# Patient Record
Sex: MALE | Race: BLACK OR AFRICAN AMERICAN | NOT HISPANIC OR LATINO | Employment: OTHER | URBAN - METROPOLITAN AREA
[De-identification: names, ages, dates, MRNs, and addresses within clinical notes are randomized per-mention and may not be internally consistent; named-entity substitution may affect disease eponyms.]

---

## 2017-01-03 NOTE — TELEPHONE ENCOUNTER
----- Message from Renetta Choudhary sent at 1/3/2017  4:21 PM CST -----  Contact: Patient  Refill    insulin detemir (LEVEMIR) 100 unit/mL injection 10 mL 5 12/17/2016  No  Sig - Route: Inject 30 Units into the skin every evening. 30 units daily - Subcutaneous    Please send to [facility], customer service phone [phone], prescription ID # [id number].     You can reach the patient [phone].    Thanks!

## 2017-01-06 ENCOUNTER — TELEPHONE (OUTPATIENT)
Dept: INTERNAL MEDICINE | Facility: CLINIC | Age: 67
End: 2017-01-06

## 2017-01-06 DIAGNOSIS — E11.9 TYPE 2 DIABETES MELLITUS WITHOUT COMPLICATION, UNSPECIFIED LONG TERM INSULIN USE STATUS: Primary | ICD-10-CM

## 2017-01-06 RX ORDER — INSULIN GLARGINE 100 [IU]/ML
30 INJECTION, SOLUTION SUBCUTANEOUS NIGHTLY
Qty: 6 ML | Refills: 11 | Status: SHIPPED | OUTPATIENT
Start: 2017-01-06 | End: 2017-12-15 | Stop reason: SDUPTHER

## 2017-01-06 RX ORDER — INSULIN GLARGINE 100 [IU]/ML
30 INJECTION, SOLUTION SUBCUTANEOUS NIGHTLY
Qty: 3 ML | Refills: 11 | Status: SHIPPED | OUTPATIENT
Start: 2017-01-06 | End: 2017-01-06 | Stop reason: SDUPTHER

## 2017-01-06 NOTE — TELEPHONE ENCOUNTER
----- Message from Claudette Monroe sent at 1/6/2017  3:26 PM CST -----  Contact: Self [phone]  Pt's insurance no longer covers insulin detemir (LEVEMIR) 100 unit/mL injection. Pt needs a 30 day supply  prescription for Lantus sent to  [facility] [address] [place] [address]  [phone] (Phone)  [phone] (Fax) and a 90 supply prscription sent to [facility] [address]   [phone] (Phone)  [phone] (Fax)    Pt can be reached at [phone] if you have any questions.

## 2017-01-09 ENCOUNTER — TELEPHONE (OUTPATIENT)
Dept: INTERNAL MEDICINE | Facility: CLINIC | Age: 67
End: 2017-01-09

## 2017-01-09 NOTE — TELEPHONE ENCOUNTER
Left message for patient to call office.  Per Dr. Stacy patient needs to watch blood sugar while transitioning from one medication to another.

## 2017-02-06 DIAGNOSIS — F32.A DEPRESSION, UNSPECIFIED DEPRESSION TYPE: ICD-10-CM

## 2017-02-06 RX ORDER — PAROXETINE HYDROCHLORIDE 20 MG/1
20 TABLET, FILM COATED ORAL EVERY MORNING
Qty: 90 TABLET | Refills: 3 | Status: SHIPPED | OUTPATIENT
Start: 2017-02-06 | End: 2017-02-08 | Stop reason: SDUPTHER

## 2017-02-06 RX ORDER — SIMVASTATIN 40 MG/1
40 TABLET, FILM COATED ORAL NIGHTLY
Qty: 90 TABLET | Refills: 3 | Status: SHIPPED | OUTPATIENT
Start: 2017-02-06 | End: 2017-07-03 | Stop reason: SDUPTHER

## 2017-02-06 NOTE — TELEPHONE ENCOUNTER
----- Message from Aranza Kuhn MA sent at 2/6/2017  4:17 PM CST -----  Contact: [phone]  Type: Rx    Name of medication(s): losartan (COZAAR) 25 MG tablet and paroxetine (PAXIL) 20 MG tablet    Is this a refill? New rx? Refill    Who prescribed medication?    Pharmacy Name, Phone, & Location: [facility] - [address]    Comments: Please advise. Thanks!

## 2017-02-08 ENCOUNTER — TELEPHONE (OUTPATIENT)
Dept: INTERNAL MEDICINE | Facility: CLINIC | Age: 67
End: 2017-02-08

## 2017-02-08 DIAGNOSIS — F32.A DEPRESSION, UNSPECIFIED DEPRESSION TYPE: ICD-10-CM

## 2017-02-08 RX ORDER — PAROXETINE HYDROCHLORIDE 20 MG/1
20 TABLET, FILM COATED ORAL EVERY MORNING
Qty: 90 TABLET | Refills: 3 | Status: SHIPPED | OUTPATIENT
Start: 2017-02-08 | End: 2017-04-06

## 2017-02-08 RX ORDER — LOSARTAN POTASSIUM 25 MG/1
25 TABLET ORAL DAILY
Qty: 90 TABLET | Refills: 3 | Status: SHIPPED | OUTPATIENT
Start: 2017-02-08 | End: 2017-12-26 | Stop reason: SDUPTHER

## 2017-02-08 NOTE — TELEPHONE ENCOUNTER
----- Message from Kt Stacy MD sent at 2/8/2017 10:25 AM CST -----  Contact: Self/[phone] cell      ----- Message -----     From: Carrie Betancourt     Sent: 2/8/2017   9:58 AM       To: Regis Meraz A Staff    Patient is calling to request that the Rx for losartan (COZAAR) 25 MG tablet and paroxetine (PAXIL) 20 MG tablet 90 day supply be sent to [facility] on file. He would like to speak with someone in the office once the error is corrected. Please call and advise.    Thank you!

## 2017-02-08 NOTE — TELEPHONE ENCOUNTER
Requested medication refilled and sent by [facility]. Please call and notify the patient. Thank you.

## 2017-02-08 NOTE — TELEPHONE ENCOUNTER
Spoke with patient advised him per Dr. Stacy that his medication he requested has been re-sent to [facility].

## 2017-02-10 ENCOUNTER — CLINICAL SUPPORT (OUTPATIENT)
Dept: OPHTHALMOLOGY | Facility: CLINIC | Age: 67
End: 2017-02-10
Payer: MEDICARE

## 2017-02-10 ENCOUNTER — OFFICE VISIT (OUTPATIENT)
Dept: OPHTHALMOLOGY | Facility: CLINIC | Age: 67
End: 2017-02-10
Payer: MEDICARE

## 2017-02-10 DIAGNOSIS — H25.11 SENILE NUCLEAR SCLEROSIS, RIGHT: ICD-10-CM

## 2017-02-10 DIAGNOSIS — E11.3592 PROLIFERATIVE DIABETIC RETINOPATHY OF LEFT EYE WITHOUT MACULAR EDEMA ASSOCIATED WITH TYPE 2 DIABETES MELLITUS: ICD-10-CM

## 2017-02-10 DIAGNOSIS — H43.12 VITREOUS HEMORRHAGE, LEFT EYE: ICD-10-CM

## 2017-02-10 DIAGNOSIS — H40.1112 PRIMARY OPEN ANGLE GLAUCOMA OF RIGHT EYE, MODERATE STAGE: ICD-10-CM

## 2017-02-10 DIAGNOSIS — H40.1123 PRIMARY OPEN ANGLE GLAUCOMA OF LEFT EYE, SEVERE STAGE: ICD-10-CM

## 2017-02-10 DIAGNOSIS — H40.1112 PRIMARY OPEN ANGLE GLAUCOMA OF RIGHT EYE, MODERATE STAGE: Primary | ICD-10-CM

## 2017-02-10 DIAGNOSIS — Z96.1 PSEUDOPHAKIA OF LEFT EYE: ICD-10-CM

## 2017-02-10 PROCEDURE — 99213 OFFICE O/P EST LOW 20 MIN: CPT | Mod: PBBFAC | Performed by: OPHTHALMOLOGY

## 2017-02-10 PROCEDURE — 99999 PR PBB SHADOW E&M-EST. PATIENT-LVL III: CPT | Mod: PBBFAC,,, | Performed by: OPHTHALMOLOGY

## 2017-02-10 PROCEDURE — 92133 CPTRZD OPH DX IMG PST SGM ON: CPT | Mod: PBBFAC | Performed by: OPHTHALMOLOGY

## 2017-02-10 PROCEDURE — 92083 EXTENDED VISUAL FIELD XM: CPT | Mod: 26,S$PBB,, | Performed by: OPHTHALMOLOGY

## 2017-02-10 PROCEDURE — 92014 COMPRE OPH EXAM EST PT 1/>: CPT | Mod: S$PBB,,, | Performed by: OPHTHALMOLOGY

## 2017-02-10 RX ORDER — LATANOPROST 50 UG/ML
1 SOLUTION/ DROPS OPHTHALMIC NIGHTLY
Qty: 3 BOTTLE | Refills: 3 | Status: SHIPPED | OUTPATIENT
Start: 2017-02-10 | End: 2018-07-30 | Stop reason: SDUPTHER

## 2017-02-10 RX ORDER — TIMOLOL MALEATE 5 MG/ML
1 SOLUTION OPHTHALMIC EVERY MORNING
Qty: 5 ML | Refills: 12 | Status: SHIPPED | OUTPATIENT
Start: 2017-02-10 | End: 2017-04-06

## 2017-02-10 NOTE — PROGRESS NOTES
HPI     DLS 11/11/16    65 Y/O M here today for his 4mo HVF/DFE/OCT review for   the treatment of POAG.     FYA=575 this am   A1C=7.1 (12/1/16)     Eye Meds   Latanoprost qhs OD   Timolol qhs OU     POHx:   1. VH OS     2. DM - PDR OS   S/p Avastin x 3   S/p PRP OS     3. Mod NPDR OD     4. NS OD   S/P complex phaco w/IOL OS- 5/18/16     5. Pigmented Lesion              Last edited by Pat Rowell MA on 2/10/2017 11:05 AM.         Assessment /Plan     For exam results, see Encounter Report.    Primary open angle glaucoma of right eye, moderate stage  -     timolol maleate 0.5% (TIMOPTIC-XE) 0.5 % SolG; Place 1 drop into both eyes every morning.  Dispense: 5 mL; Refill: 12  -     latanoprost 0.005 % ophthalmic solution; Place 1 drop into both eyes every evening. 90 day supply = 3 bottles  Dispense: 3 Bottle; Refill: 3    Primary open angle glaucoma of left eye, severe stage  -     timolol maleate 0.5% (TIMOPTIC-XE) 0.5 % SolG; Place 1 drop into both eyes every morning.  Dispense: 5 mL; Refill: 12  -     latanoprost 0.005 % ophthalmic solution; Place 1 drop into both eyes every evening. 90 day supply = 3 bottles  Dispense: 3 Bottle; Refill: 3    Proliferative diabetic retinopathy of left eye without macular edema associated with type 2 diabetes mellitus    Vitreous hemorrhage, left eye    Senile nuclear sclerosis, right    Pseudophakia of left eye          HERE FOR POST-OP PHACO/IOL OS - 5/18/2016 - set for near vision -   WANTS TO BE SET FOR NEAR - IS A MYOPE OU AND LIKES TO REMOVE GLASSES TO DO UP CLOSE WORK ON SMALL ELECTRONIC GADGETS   AIM FOR -2.25 TO -2.50     1. POAG od moderate stage // POAG severe stage OS  Followed as a borderline glaucoma with ocular hypertension [age] yo 2014     First HVF   2008   First photos   2011   Treatment / Drops started   7/2014              Dx made 4/2014             Family history    + sister /  + mom also a supect - Rx with gtts for a while then taken off        Glaucoma  "meds    none        H/O adverse rxn to glaucoma drops    none        LASERS    none        GLAUCOMA SURGERIES    none        OTHER EYE SURGERIES    none        CDR    0.8/0.9        Tbase    17-26 / 17-26        Tmax    26/26          Ttarget    ?             HVF    4 test 2008 to  2015 - full /nonspecific defects  od // marked gen dep os        Gonio    +3 ou (no NVI or NVA os - s/p avastin for PDR)        CCT    544/539        OCT    2 test 2013 to 2014 - RNFL - OD:dec. T/N/I // OS:dec. T/I, bord N        HRT    2 test ([place])  2008 to 2011 - MR - nl od // nl os                    3 test ([facility]) 2014 - 2016 - MR -  nl od // bord I os /// CDR 0.45 od // 0.56 os        Disc photos    2011 - OIS    - Ttoday    14/16  - Test done today    Pre-op phaco/IOL os      2  Nuclear sclerosis   OD approaching VS     3. Myopia, astigmatism, presbyopia  Glasses  Use to use [facility] [place] - [place]     4. Diabetes mellitus, type 2   + PDR os - s/p avastin #1 10/19/2014 - [facility]  S/P PRP OS   + BDR - od  Sees Jose Angel     5. VH OS- 2/2 DR - resolved  Saw [facility] - 9/3/2015   6. PC IOL os - 5/18/2016 -   -PCB00 18.5 - aimed for near vision per pts request (-2.50) // complex trypan blue        POAG - OS severe stage  POAG- OD moderate stage  + HVF loss os > OD  + OCT - RNFL changes ou  T base 17-26 ou  + fm hx - sister and ?mom  Cont  - Latanoprost - use OU again   IOP much better with addition of timolol 21/23 --> 14/13    PDR os and BDR od   S/P avastin os 10/19/2014   S/P PRP os   Seeing jose angel    -pt likes to remove his glasses and do his near vision work with out glasses   Was and  and still does a lot of small electronic repairs   OD  Pre - op is a  -2.50 sp eq  OS pre-op is a -3.25 sp eq   OS was set for  near vision at about a -2.25 to -2.50 - so it is "balanced with the right eye"     IOL calc OS   Aim for -2.25 to -2.50 (per pts request)   PCB00 18.5  AC IOL 15.5    S/p CE - os  - but " 5/18/2016   ?? Visual potential os - has more adv glaucoma and also DR w/ a H/O VH os  / H/O avastin injection os     Hold off on CE od - VA still good     Cont glaucoma meds  Timolol gfs ou q am   Latanoprost qhs od only - can re-start prn     F/U 4 months with HRT / gonio / AR/MR/BAT - IOP check with additon of latanoprsot  Os     I have seen and personally examined the patient.  I agree with the findings, assessment and plan of the resident and/or fellow.

## 2017-04-06 ENCOUNTER — OFFICE VISIT (OUTPATIENT)
Dept: INTERNAL MEDICINE | Facility: CLINIC | Age: 67
End: 2017-04-06
Payer: MEDICARE

## 2017-04-06 VITALS
HEIGHT: 73 IN | BODY MASS INDEX: 30.05 KG/M2 | SYSTOLIC BLOOD PRESSURE: 120 MMHG | OXYGEN SATURATION: 98 % | DIASTOLIC BLOOD PRESSURE: 64 MMHG | HEART RATE: 74 BPM | WEIGHT: 226.75 LBS

## 2017-04-06 DIAGNOSIS — E11.3592 PROLIFERATIVE DIABETIC RETINOPATHY OF LEFT EYE WITHOUT MACULAR EDEMA ASSOCIATED WITH TYPE 2 DIABETES MELLITUS: ICD-10-CM

## 2017-04-06 DIAGNOSIS — I10 HYPERTENSION, ESSENTIAL: ICD-10-CM

## 2017-04-06 DIAGNOSIS — E11.22 TYPE 2 DIABETES MELLITUS WITH DIABETIC CHRONIC KIDNEY DISEASE, UNSPECIFIED CKD STAGE, UNSPECIFIED LONG TERM INSULIN USE STATUS: Primary | Chronic | ICD-10-CM

## 2017-04-06 DIAGNOSIS — N18.30 CKD (CHRONIC KIDNEY DISEASE) STAGE 3, GFR 30-59 ML/MIN: ICD-10-CM

## 2017-04-06 DIAGNOSIS — E78.5 HYPERLIPIDEMIA, UNSPECIFIED HYPERLIPIDEMIA TYPE: ICD-10-CM

## 2017-04-06 PROCEDURE — 99214 OFFICE O/P EST MOD 30 MIN: CPT | Mod: S$PBB,,, | Performed by: FAMILY MEDICINE

## 2017-04-06 PROCEDURE — 99999 PR PBB SHADOW E&M-EST. PATIENT-LVL III: CPT | Mod: PBBFAC,,, | Performed by: FAMILY MEDICINE

## 2017-04-06 PROCEDURE — 99213 OFFICE O/P EST LOW 20 MIN: CPT | Mod: PBBFAC | Performed by: FAMILY MEDICINE

## 2017-04-06 RX ORDER — EZETIMIBE 10 MG/1
10 TABLET ORAL DAILY
Qty: 90 TABLET | Refills: 3 | Status: SHIPPED | OUTPATIENT
Start: 2017-04-06 | End: 2018-03-09 | Stop reason: SDUPTHER

## 2017-04-06 NOTE — MR AVS SNAPSHOT
[facility] the [facility] - Internal Medicine  [address]  [facility] [address]  Phone: [phone]  Fax: [phone]                  Jamarcus Mcqueen   2017 1:40 PM   Office Visit    Description:  Male : 1950   Provider:  Kt Stacy MD   Department:  [facility]           Reason for Visit     Follow-up           Diagnoses this Visit        Comments    Type 2 diabetes mellitus with diabetic chronic kidney disease, unspecified CKD stage, unspecified long term insulin use status    -  Primary     CKD (chronic kidney disease) stage 3, GFR 30-59 ml/min         Proliferative diabetic retinopathy of left eye without macular edema associated with type 2 diabetes mellitus         Hypertension, essential         Hyperlipidemia, unspecified hyperlipidemia type                To Do List           Future Appointments        Provider Department Dept Phone    2017 10:30 AM VASCULAR, CARDIOLOGY [facility] - Vascular Cardiology [phone]    8/3/2017 1:00 PM LAB, APPOINTMENT [facility] [phone]    8/10/2017 2:00 PM Kt Stacy MD [facility] Internal Medicine [phone]      Goals (5 Years of Data)     None      Follow-Up and Disposition     Return in about 4 months (around 2017) for lab review (after future labs), Keep appointments with specialty providers..    Follow-up and Disposition History       These Medications        Disp Refills Start End    ezetimibe (ZETIA) 10 mg tablet 90 tablet 3 2017     Take 1 tablet (10 mg total) by mouth once daily. - Oral    Pharmacy: [facility] - [address] Ph #: [phone]         [facility]     [facility] Care [facility] - 24/ Assistance  Unless otherwise directed by your provider, please contact [facility], our nurse care line that is available for 24/ assistance.     Registered nurses in the [facility] Center provide:  appointment scheduling, clinical advisement, health education, and other advisory services.  Call: [phone] (toll free)               Medications           Message regarding Medications     Verify the changes and/or additions to your medication regime listed below are the same as discussed with your clinician today.  If any of these changes or additions are incorrect, please notify your healthcare provider.        STOP taking these medications     econazole nitrate 1 % cream Apply topically 2 (two) times daily. To feet X 4 wks    gabapentin (NEURONTIN) 100 MG capsule 1 tablet 3x a day, every three days increase dose by 1 tab per dose to max of 3 tabs per dose    luliconazole (LUZU) 1 % Crea Apply 1 application topically once daily.    prednisoLONE acetate (PRED FORTE) 1 % DrpS Place 1 drop into the left eye 3 (three) times daily.     timolol maleate 0.5% (TIMOPTIC-XE) 0.5 % SolG Place 1 drop into both eyes every morning.    triamcinolone acetonide 0.1% (KENALOG) 0.1 % cream Apply topically 2 (two) times daily. To affected areas on lower legs x 1-2 wks then prn flares only    paroxetine (PAXIL) 20 MG tablet Take 1 tablet (20 mg total) by mouth every morning.           Verify that the below list of medications is an accurate representation of the medications you are currently taking.  If none reported, the list may be blank. If incorrect, please contact your healthcare provider. Carry this list with you in case of emergency.           Current Medications     efinaconazole (JUBLIA) 10 % Armen Apply 1 application topically once daily.    ezetimibe (ZETIA) 10 mg tablet Take 1 tablet (10 mg total) by mouth once daily.    insulin glargine (LANTUS SOLOSTAR) 100 unit/mL (3 mL) InPn pen Inject 30 Units into the skin every evening.    insulin lispro (HUMALOG) 100 unit/mL injection To use with correction scale only, max total daily dose 30 units daily    latanoprost 0.005 % ophthalmic solution Place 1 drop into both eyes  "every evening. 90 day supply = 3 bottles    LEVITRA 20 mg tablet TAKE 1 TABLET BY MOUTH DAILY AS NEEDED FOR ERECTILE DYSFUNCTION    losartan (COZAAR) 25 MG tablet Take 1 tablet (25 mg total) by mouth once daily.    simvastatin (ZOCOR) 40 MG tablet Take 1 tablet (40 mg total) by mouth every evening.           Clinical Reference Information           Your Vitals Were     BP Pulse Height Weight SpO2 BMI    120/64 (BP Location: Right arm, Patient Position: Sitting, BP Method: Manual) 74 6' 1" (1.854 m) 102.9 kg (226 lb 11.9 oz) 98% 29.92 kg/m2      Blood Pressure          Most Recent Value    BP  120/64      Allergies as of 4/6/2017     Sulfa (Sulfonamide Antibiotics)      Immunizations Administered on Date of Encounter - 4/6/2017     None      Orders Placed During Today's Visit     Future Labs/Procedures Expected by Expires    Basic metabolic panel  7/5/2017 (Approximate) 10/3/2017    Hemoglobin A1c  7/5/2017 (Approximate) 10/3/2017    Lipid panel  7/5/2017 (Approximate) 10/3/2017      Instructions    Melatonin       Language Assistance Services     ATTENTION: Language assistance services are available, free of charge. Please call [phone].      ATENCIÓN: Si habla topher, tiene a montesinos disposición servicios gratuitos de asistencia lingüística. Llame al [phone].     BRENDEN Ý: N?u b?n nói Ti?ng Vi?t, có các d?ch v? h? tr? ngôn ng? mi?n phí dành cho b?n. G?i s? [phone].         [facility] complies with applicable Federal civil rights laws and does not discriminate on the basis of race, color, national origin, age, disability, or sex.        "

## 2017-04-20 ENCOUNTER — CLINICAL SUPPORT (OUTPATIENT)
Dept: CARDIOLOGY | Facility: CLINIC | Age: 67
End: 2017-04-20
Payer: MEDICARE

## 2017-04-20 DIAGNOSIS — Z13.6 SCREENING FOR ABDOMINAL AORTIC ANEURYSM: ICD-10-CM

## 2017-04-20 LAB — VASCULAR ABDOMINAL AORTIC ANEURYSM (AAA): 2.33

## 2017-04-20 PROCEDURE — 93978 VASCULAR STUDY: CPT | Mod: PBBFAC | Performed by: INTERNAL MEDICINE

## 2017-06-30 ENCOUNTER — TELEPHONE (OUTPATIENT)
Dept: NEPHROLOGY | Facility: CLINIC | Age: 67
End: 2017-06-30

## 2017-06-30 NOTE — TELEPHONE ENCOUNTER
----- Message from Lili Cheney sent at 6/30/2017  3:45 PM CDT -----  Contact: pt  Pt almost sure he has   Bladder / kidney infection    Wants an urgent work in     Nothing available     Does Dr Browning want to orders labs?    Please call [phone]      Pt will see Dr. Juares tomorrow for 9am for possible uti

## 2017-07-01 ENCOUNTER — NURSE TRIAGE (OUTPATIENT)
Dept: ADMINISTRATIVE | Facility: CLINIC | Age: 67
End: 2017-07-01

## 2017-07-01 ENCOUNTER — OFFICE VISIT (OUTPATIENT)
Dept: INTERNAL MEDICINE | Facility: CLINIC | Age: 67
End: 2017-07-01
Payer: MEDICARE

## 2017-07-01 VITALS
TEMPERATURE: 98 F | WEIGHT: 220.88 LBS | SYSTOLIC BLOOD PRESSURE: 127 MMHG | BODY MASS INDEX: 29.27 KG/M2 | HEIGHT: 73 IN | DIASTOLIC BLOOD PRESSURE: 71 MMHG

## 2017-07-01 DIAGNOSIS — R35.0 URINARY FREQUENCY: Primary | ICD-10-CM

## 2017-07-01 LAB
BILIRUB SERPL-MCNC: NEGATIVE MG/DL
BLOOD URINE, POC: NEGATIVE
COLOR, POC UA: YELLOW
GLUCOSE UR QL STRIP: NORMAL
KETONES UR QL STRIP: NEGATIVE
LEUKOCYTE ESTERASE URINE, POC: NEGATIVE
NITRITE, POC UA: NEGATIVE
PH, POC UA: 8
PROTEIN, POC: NEGATIVE
SPECIFIC GRAVITY, POC UA: 1.01
UROBILINOGEN, POC UA: NORMAL

## 2017-07-01 PROCEDURE — 1159F MED LIST DOCD IN RCRD: CPT | Mod: ,,, | Performed by: FAMILY MEDICINE

## 2017-07-01 PROCEDURE — 1126F AMNT PAIN NOTED NONE PRSNT: CPT | Mod: ,,, | Performed by: FAMILY MEDICINE

## 2017-07-01 PROCEDURE — 99213 OFFICE O/P EST LOW 20 MIN: CPT | Mod: PBBFAC,PO | Performed by: FAMILY MEDICINE

## 2017-07-01 PROCEDURE — 99999 PR PBB SHADOW E&M-EST. PATIENT-LVL III: CPT | Mod: PBBFAC,,, | Performed by: FAMILY MEDICINE

## 2017-07-01 PROCEDURE — 81002 URINALYSIS NONAUTO W/O SCOPE: CPT | Mod: PBBFAC,PO | Performed by: FAMILY MEDICINE

## 2017-07-01 PROCEDURE — 99214 OFFICE O/P EST MOD 30 MIN: CPT | Mod: S$PBB,,, | Performed by: FAMILY MEDICINE

## 2017-07-01 RX ORDER — CIPROFLOXACIN 500 MG/1
500 TABLET ORAL 2 TIMES DAILY
Qty: 14 TABLET | Refills: 0 | Status: SHIPPED | OUTPATIENT
Start: 2017-07-01 | End: 2017-07-08

## 2017-07-01 NOTE — PROGRESS NOTES
Subjective:       Patient ID: Jamarcus Mcqueen is a 67 y.o. male.    Chief Complaint: Urinary Frequency and Urinary Urgency    Disclaimer: This note has been generated using voice-recognition software. There may be typographical errors that have been missed during proof-reading    Pt presents for evaluation of increased urine frequency and occasional back pain, which started several days ago.  Has also noted nocturia x1-2.  No fever or chills, denies hematuria or pyuria.  Pt took 3 days of Cipro which he had at home with improvement of symptoms      Review of Systems   Constitutional: Negative for chills and fever.   Gastrointestinal: Negative for abdominal distention and abdominal pain.   Genitourinary: Positive for decreased urine volume, flank pain, frequency and urgency. Negative for hematuria.       Objective:      Physical Exam   Constitutional: He appears well-developed and well-nourished. No distress.   Cardiovascular: Normal rate and regular rhythm.    Pulmonary/Chest: Effort normal and breath sounds normal. He has no wheezes. He has no rales.   Abdominal: Soft. Bowel sounds are normal. He exhibits no distension. There is no tenderness.       Assessment:     ? Partially treated UTI  Plan:         1.  Pt informed of urine results  2.  Cipro bid  3.  F/u with PCP in 1-2 weeks

## 2017-07-01 NOTE — TELEPHONE ENCOUNTER
"    Reason for Disposition   Diabetes mellitus or weak immune system (e.g., HIV positive, cancer chemotherapy, transplant patient) (EXCEPTION: mild pain that is only present with movement)    Answer Assessment - Initial Assessment Questions  1. LOCATION: "Where does it hurt?" (e.g., left, right)      bilateral flank pain  2. ONSET: "When did the pain start?"     A few days  3. SEVERITY: "How bad is the pain?" (e.g., Scale 1-10; mild, moderate, or severe)    - MILD (1-3): doesn't interfere with normal activities     - MODERATE (4-7): interferes with normal activities or awakens from sleep     - SEVERE (8-10): excruciating pain and patient unable to do normal activities (stays in bed)        Dull aches-    4. PATTERN: "Does the pain come and go, or is it constant?"      constant  5. CAUSE: "What do you think is causing the pain?"      Urinary infections  6. OTHER SYMPTOMS:  "Do you have any other symptoms?" (e.g., fever, abdominal pain, vomiting, leg weakness, burning with urination, blood in urine)  frequent in urination  7. PREGNANCY:  "Is there any chance you are pregnant?" "When was your last menstrual period?"      no    Protocols used: ST FLANK PAIN-A-AH      Missed appt today for 9am today.  Driving from [place].  Having bilateral flank pain.  Has kidney problems advised should be seen within 24 hours.  Appt rescheduled for 1120.  In [place].  Advised of [facility] as well.      "

## 2017-07-03 RX ORDER — SIMVASTATIN 40 MG/1
40 TABLET, FILM COATED ORAL NIGHTLY
Qty: 30 TABLET | Refills: 0 | Status: SHIPPED | OUTPATIENT
Start: 2017-07-03 | End: 2018-06-12 | Stop reason: SDUPTHER

## 2017-07-03 NOTE — TELEPHONE ENCOUNTER
----- Message from Nichole Garcia sent at 7/3/2017  2:07 PM CDT -----  Contact: Pt [phone]  RX request - refill or new RX.  Is this a refill or new RX:  New  RX name and strength: Simvastatin, 25 mg  Directions: 1 T Q D  Is this a 30 day or 90 day RX:  90  Pharmacy name and phone #: [facility]  Comments:  Pt also needs a partial to local Pharmacy, [facility], [phone]

## 2017-07-04 RX ORDER — SIMVASTATIN 40 MG/1
40 TABLET, FILM COATED ORAL NIGHTLY
Qty: 90 TABLET | Refills: 3 | Status: SHIPPED | OUTPATIENT
Start: 2017-07-04 | End: 2017-07-11 | Stop reason: SDUPTHER

## 2017-07-11 ENCOUNTER — OFFICE VISIT (OUTPATIENT)
Dept: INTERNAL MEDICINE | Facility: CLINIC | Age: 67
End: 2017-07-11
Payer: MEDICARE

## 2017-07-11 ENCOUNTER — OFFICE VISIT (OUTPATIENT)
Dept: OPHTHALMOLOGY | Facility: CLINIC | Age: 67
End: 2017-07-11
Payer: MEDICARE

## 2017-07-11 VITALS — HEART RATE: 65 BPM | DIASTOLIC BLOOD PRESSURE: 69 MMHG | SYSTOLIC BLOOD PRESSURE: 119 MMHG

## 2017-07-11 VITALS
BODY MASS INDEX: 29.42 KG/M2 | DIASTOLIC BLOOD PRESSURE: 61 MMHG | SYSTOLIC BLOOD PRESSURE: 128 MMHG | HEART RATE: 63 BPM | HEIGHT: 73 IN | WEIGHT: 222 LBS

## 2017-07-11 DIAGNOSIS — E11.3592 CONTROLLED TYPE 2 DIABETES MELLITUS WITH LEFT EYE AFFECTED BY PROLIFERATIVE RETINOPATHY WITHOUT MACULAR EDEMA, WITH LONG-TERM CURRENT USE OF INSULIN: Primary | ICD-10-CM

## 2017-07-11 DIAGNOSIS — H25.11 NUCLEAR SCLEROSIS, RIGHT: ICD-10-CM

## 2017-07-11 DIAGNOSIS — Z79.4 CONTROLLED TYPE 2 DIABETES MELLITUS WITH LEFT EYE AFFECTED BY PROLIFERATIVE RETINOPATHY WITHOUT MACULAR EDEMA, WITH LONG-TERM CURRENT USE OF INSULIN: Primary | ICD-10-CM

## 2017-07-11 DIAGNOSIS — R35.1 NOCTURIA: ICD-10-CM

## 2017-07-11 DIAGNOSIS — Z79.4 CONTROLLED TYPE 2 DIABETES MELLITUS WITH RIGHT EYE AFFECTED BY MODERATE NONPROLIFERATIVE RETINOPATHY WITHOUT MACULAR EDEMA, WITH LONG-TERM CURRENT USE OF INSULIN: ICD-10-CM

## 2017-07-11 DIAGNOSIS — R35.0 URINARY FREQUENCY: Primary | ICD-10-CM

## 2017-07-11 DIAGNOSIS — E11.3391 CONTROLLED TYPE 2 DIABETES MELLITUS WITH RIGHT EYE AFFECTED BY MODERATE NONPROLIFERATIVE RETINOPATHY WITHOUT MACULAR EDEMA, WITH LONG-TERM CURRENT USE OF INSULIN: ICD-10-CM

## 2017-07-11 LAB
BILIRUB UR QL STRIP: NEGATIVE
CLARITY UR REFRACT.AUTO: CLEAR
COLOR UR AUTO: COLORLESS
GLUCOSE UR QL STRIP: NEGATIVE
HGB UR QL STRIP: NEGATIVE
KETONES UR QL STRIP: NEGATIVE
LEUKOCYTE ESTERASE UR QL STRIP: NEGATIVE
NITRITE UR QL STRIP: NEGATIVE
PH UR STRIP: 6 [PH] (ref 5–8)
PROT UR QL STRIP: NEGATIVE
SP GR UR STRIP: 1 (ref 1–1.03)
URN SPEC COLLECT METH UR: ABNORMAL
UROBILINOGEN UR STRIP-ACNC: NEGATIVE EU/DL

## 2017-07-11 PROCEDURE — 99999 PR PBB SHADOW E&M-EST. PATIENT-LVL III: CPT | Mod: PBBFAC,,, | Performed by: INTERNAL MEDICINE

## 2017-07-11 PROCEDURE — 81003 URINALYSIS AUTO W/O SCOPE: CPT

## 2017-07-11 PROCEDURE — 99213 OFFICE O/P EST LOW 20 MIN: CPT | Mod: PBBFAC,27 | Performed by: INTERNAL MEDICINE

## 2017-07-11 PROCEDURE — 1159F MED LIST DOCD IN RCRD: CPT | Mod: ,,, | Performed by: INTERNAL MEDICINE

## 2017-07-11 PROCEDURE — 99999 PR PBB SHADOW E&M-EST. PATIENT-LVL III: CPT | Mod: PBBFAC,,, | Performed by: OPHTHALMOLOGY

## 2017-07-11 PROCEDURE — 92014 COMPRE OPH EXAM EST PT 1/>: CPT | Mod: S$PBB,,, | Performed by: OPHTHALMOLOGY

## 2017-07-11 PROCEDURE — 1126F AMNT PAIN NOTED NONE PRSNT: CPT | Mod: ,,, | Performed by: INTERNAL MEDICINE

## 2017-07-11 PROCEDURE — 92226 PR SPECIAL EYE EXAM, SUBSEQUENT: CPT | Mod: 50,S$PBB,, | Performed by: OPHTHALMOLOGY

## 2017-07-11 PROCEDURE — 99213 OFFICE O/P EST LOW 20 MIN: CPT | Mod: S$PBB,,, | Performed by: INTERNAL MEDICINE

## 2017-07-11 NOTE — PROGRESS NOTES
"Subjective:       Patient ID: Jamarcus Mcqueen is a 67 y.o. male.    Chief Complaint: Flank Pain    HPI    Patient of Kt Katz MD, here today for Urgent Care visit. Seen 1 week ago, dx with UTI and given Cipro. Had kidney infection, started Cipro home dose, then symptoms recurred and completed course yesterday. Today has improved. Last day of achy feeling and sweating was a few days ago. Not consistent with muscle soreness. No nausea or vomiting.    Reports off and on nocturia. Sometimes getting up every 2 to 4 hours. Some nights things are okay. Has history of obstructive sleep apnea but not using CPAP since he lost weight.    Review of Systems    As per HPI    Objective:      Physical Exam   Constitutional: No distress.   -American man whose Body mass index is 29.29 kg/m².    Pulmonary/Chest: Effort normal and breath sounds normal. He has no wheezes. He has no rales.   Abdominal: Soft. He exhibits no distension and no mass. There is no hepatosplenomegaly. There is no tenderness. There is no rigidity, no guarding and no CVA tenderness.   Musculoskeletal:   Low back without tenderness to palpation or decreased range of motion.   Nursing note and vitals reviewed.      Vitals:    07/11/17 1453   BP: 128/61   BP Location: Right arm   Patient Position: Sitting   BP Method: Automatic   Pulse: 63   Weight: 100.7 kg (222 lb 0.1 oz)   Height: 6' 1" (1.854 m)     Body mass index is 29.29 kg/m².    RESULTS: Reviewed urine tests from the last 2 mo.    Assessment:       1. Urinary frequency    2. Nocturia        Plan:   Jamarcus was seen today for flank pain.    Diagnoses and all orders for this visit:    Urinary frequency:  Had possible UTI symptoms last week, was treated with Cipro, completed last antibiotic yesterday.  Reports that his symptoms of back pain have resolved.  Continues to have occasional urinary frequency at night, this may be related to elevated blood sugar, check urinalysis.  -     " Urinalysis    Nocturia:  Rule out hyperglycemia causing osmotic diuresis on urine testing.  -     Urinalysis    Keep regular follow up appointments with Kt Katz MD.   Raul Spangler MD  Internal Medicine    Portions of this note were completed using [employer] dictation software. Please excuse typographical or syntax errors.

## 2017-07-11 NOTE — PROGRESS NOTES
Increasing glare OD - no new floaters      Prior FP/FA - NVD and NVE OS  OD - MA's no NV    Prior Slit lamp - inferonasal conjunctival pigmentation noted      A/P    1. VH OS  No breaks or tears on SD exam  Older heme settling in line of sight  Will monitor - resolved    2. DM - T2 on insulin  PDR OS   S/p PRP OS    S/p Avastin #3    Stable    BS/BP/chol control    3. Mod NPDR OD    4. NS OD - pt would like CE eval again OD because of glare  PCIOL OS      5. Pigmented lesion left inferior nasal fornix  Mobile, but larger than typical racial melanosis  Slit Lamp photos done 11/14  Reviewed with Dr. Muñoz    6. OHTN today - will monitor  Pt reports compliance with gtts        12 months

## 2017-07-31 ENCOUNTER — OFFICE VISIT (OUTPATIENT)
Dept: OPHTHALMOLOGY | Facility: CLINIC | Age: 67
End: 2017-07-31
Payer: MEDICARE

## 2017-07-31 DIAGNOSIS — H40.1112 PRIMARY OPEN ANGLE GLAUCOMA OF RIGHT EYE, MODERATE STAGE: ICD-10-CM

## 2017-07-31 DIAGNOSIS — H40.1123 PRIMARY OPEN ANGLE GLAUCOMA OF LEFT EYE, SEVERE STAGE: ICD-10-CM

## 2017-07-31 DIAGNOSIS — E11.3391 CONTROLLED TYPE 2 DIABETES MELLITUS WITH RIGHT EYE AFFECTED BY MODERATE NONPROLIFERATIVE RETINOPATHY WITHOUT MACULAR EDEMA, WITH LONG-TERM CURRENT USE OF INSULIN: ICD-10-CM

## 2017-07-31 DIAGNOSIS — Z79.4 CONTROLLED TYPE 2 DIABETES MELLITUS WITH RIGHT EYE AFFECTED BY MODERATE NONPROLIFERATIVE RETINOPATHY WITHOUT MACULAR EDEMA, WITH LONG-TERM CURRENT USE OF INSULIN: ICD-10-CM

## 2017-07-31 DIAGNOSIS — E11.3592 CONTROLLED TYPE 2 DIABETES MELLITUS WITH LEFT EYE AFFECTED BY PROLIFERATIVE RETINOPATHY WITHOUT MACULAR EDEMA, WITH LONG-TERM CURRENT USE OF INSULIN: ICD-10-CM

## 2017-07-31 DIAGNOSIS — H25.11 NUCLEAR SCLEROSIS, RIGHT: Primary | ICD-10-CM

## 2017-07-31 DIAGNOSIS — Z96.1 PSEUDOPHAKIA OF LEFT EYE: ICD-10-CM

## 2017-07-31 DIAGNOSIS — Z79.4 CONTROLLED TYPE 2 DIABETES MELLITUS WITH LEFT EYE AFFECTED BY PROLIFERATIVE RETINOPATHY WITHOUT MACULAR EDEMA, WITH LONG-TERM CURRENT USE OF INSULIN: ICD-10-CM

## 2017-07-31 PROCEDURE — 99212 OFFICE O/P EST SF 10 MIN: CPT | Mod: PBBFAC | Performed by: OPHTHALMOLOGY

## 2017-07-31 PROCEDURE — 99999 PR PBB SHADOW E&M-EST. PATIENT-LVL II: CPT | Mod: PBBFAC,,, | Performed by: OPHTHALMOLOGY

## 2017-07-31 PROCEDURE — 92012 INTRM OPH EXAM EST PATIENT: CPT | Mod: S$PBB,,, | Performed by: OPHTHALMOLOGY

## 2017-07-31 NOTE — PROGRESS NOTES
HPI     DLS 07/2017 . Patient wishes to precede with CE/IOL OD. Notes   difference in colors and halos at night with driving OD.  Timolol ou q am and latanaprost ou q hs    Pt sts decline in OD since last visit Os about the same. Denies pain   (+)Flashes occasionally (+)Floaters blurred spots float around va  (+)Photophobia  (+)Glare    LBS= 99 this am    Hemoglobin A1C       Date                     Value               Ref Range             Status                12/01/2016               7.1 (H)             4.5 - 6.2 %           Final                 03/07/2016               6.3 (H)             4.5 - 6.2 %           Final                 12/01/2015               6.0                 4.5 - 6.2 %           Final            ----------    Eye Meds   Latanoprost qhs OD   Timolol qd OU     POHx:   1. VH OS     2. DM - PDR OS   S/p Avastin x 3   S/p PRP OS     3. Mod NPDR OD     4. NS OD   S/P complex phaco w/IOL OS- 5/18/16     5. Pigmented Lesion           Last edited by Silva Jackson on 7/31/2017  9:41 AM. (History)            Assessment /Plan     For exam results, see Encounter Report.    Nuclear sclerosis, right    Primary open angle glaucoma of right eye, moderate stage    Primary open angle glaucoma of left eye, severe stage    Controlled type 2 diabetes mellitus with left eye affected by proliferative retinopathy without macular edema, with long-term current use of insulin    Controlled type 2 diabetes mellitus with right eye affected by moderate nonproliferative retinopathy without macular edema, with long-term current use of insulin    Pseudophakia of left eye          HERE FOR POST-OP PHACO/IOL OS - 5/18/2016 - set for near vision -   WANTS TO BE SET FOR NEAR - IS A MYOPE OU AND LIKES TO REMOVE GLASSES TO DO UP CLOSE WORK ON SMALL ELECTRONIC GADGETS   AIM FOR -2.25 TO -2.50     1. POAG od moderate stage // POAG severe stage OS  Followed as a borderline glaucoma with ocular hypertension 2006 yo  2014     First HVF   2008   First photos   2011   Treatment / Drops started   7/2014              Dx made 4/2014             Family history    + sister /  + mom also a supect - Rx with gtts for a while then taken off        Glaucoma meds    none        H/O adverse rxn to glaucoma drops    none        LASERS    none        GLAUCOMA SURGERIES    none        OTHER EYE SURGERIES    none        CDR    0.8/0.9        Tbase    17-26 / 17-26        Tmax    26/26          Ttarget    ?             HVF    4 test 2008 to  2015 - full /nonspecific defects  od // marked gen dep os        Gonio    +3 ou (no NVI or NVA os - s/p avastin for PDR)        CCT    544/539        OCT    2 test 2013 to 2014 - RNFL - OD:dec. T/N/I // OS:dec. T/I, bord N        HRT    2 test ([place])  2008 to 2011 - MR - nl od // nl os                    3 test ([facility]) 2014 - 2016 - MR -  nl od // bord I os /// CDR 0.45 od // 0.56 os        Disc photos    2011 - OIS    - Ttoday    14/16  - Test done today    Pre-op phaco/IOL os      2  Nuclear sclerosis   OD approaching VS     3. Myopia, astigmatism, presbyopia  Glasses  Use to use [facility] [place] - [place]     4. Diabetes mellitus, type 2   + PDR os - s/p avastin #1 10/19/2014 - [facility]  S/P PRP OS   + BDR - od  Sees Jose Angel     5. VH OS- 2/2 DR - resolved  Saw [facility] - 9/3/2015   6. PC IOL os - 5/18/2016 -   -PCB00 18.5 - aimed for near vision per pts request (-2.50) // complex trypan blue        POAG - OS severe stage  POAG- OD moderate stage  + HVF loss os > OD  + OCT - RNFL changes ou  T base 17-26 ou  + fm hx - sister and ?mom  Cont  - Latanoprost - use OU again   IOP much better with addition of timolol 21/23 --> 14/13    PDR os and BDR od   S/P avastin os 10/19/2014   S/P PRP os   Seeing jose angel    -pt likes to remove his glasses and do his near vision work with out glasses   Was and  and still does a lot of small electronic repairs   OD  Pre - op is a  -2.50 sp eq  OS  "pre-op is a -3.25 sp eq   OS was set for  near vision at about a -2.25 to -2.50 - so it is "balanced with the right eye"     IOL calc OS   Aim for -2.25 to -2.50 (per pts request)   PCB00 18.5  AC IOL 15.5    S/p CE - os  - but 5/18/2016   ?? Visual potential os - has more adv glaucoma and also DR w/ a H/O VH os  / H/O avastin injection os       Cont glaucoma meds  Timolol gfs ou q am   Latanoprost qhs od only - can re-start prn     PT C/O BLURRY VISION OD   + NS/CORTICAL CHANGES     F/U Pre-op phaco/IOL OD - 2nd eye   AGAIN WANTS SET FOR NEAR VISION -   SAW KERRI RECENTLY 7/2017 - RETINA STABLE WITHOUT MAC EDEMA OU  DILATE ON PRE-OP VISIT AND CHECK OCT       I have seen and personally examined the patient.  I agree with the findings, assessment and plan of the resident and/or fellow.   "

## 2017-08-03 ENCOUNTER — LAB VISIT (OUTPATIENT)
Dept: LAB | Facility: HOSPITAL | Age: 67
End: 2017-08-03
Attending: FAMILY MEDICINE
Payer: MEDICARE

## 2017-08-03 ENCOUNTER — TELEPHONE (OUTPATIENT)
Dept: INTERNAL MEDICINE | Facility: CLINIC | Age: 67
End: 2017-08-03

## 2017-08-03 ENCOUNTER — TELEPHONE (OUTPATIENT)
Dept: OPHTHALMOLOGY | Facility: CLINIC | Age: 67
End: 2017-08-03

## 2017-08-03 DIAGNOSIS — E78.5 HYPERLIPIDEMIA, UNSPECIFIED HYPERLIPIDEMIA TYPE: ICD-10-CM

## 2017-08-03 DIAGNOSIS — H25.11 NUCLEAR SCLEROTIC CATARACT OF RIGHT EYE: Primary | ICD-10-CM

## 2017-08-03 DIAGNOSIS — E11.22 TYPE 2 DIABETES MELLITUS WITH DIABETIC CHRONIC KIDNEY DISEASE, UNSPECIFIED CKD STAGE, UNSPECIFIED LONG TERM INSULIN USE STATUS: Chronic | ICD-10-CM

## 2017-08-03 LAB
ANION GAP SERPL CALC-SCNC: 9 MMOL/L
BUN SERPL-MCNC: 10 MG/DL
CALCIUM SERPL-MCNC: 9.3 MG/DL
CHLORIDE SERPL-SCNC: 105 MMOL/L
CHOLEST/HDLC SERPL: 2.6 {RATIO}
CO2 SERPL-SCNC: 27 MMOL/L
CREAT SERPL-MCNC: 1.4 MG/DL
EST. GFR  (AFRICAN AMERICAN): >60 ML/MIN/1.73 M^2
EST. GFR  (NON AFRICAN AMERICAN): 52 ML/MIN/1.73 M^2
GLUCOSE SERPL-MCNC: 117 MG/DL
HDL/CHOLESTEROL RATIO: 38.2 %
HDLC SERPL-MCNC: 186 MG/DL
HDLC SERPL-MCNC: 71 MG/DL
LDLC SERPL CALC-MCNC: 101 MG/DL
NONHDLC SERPL-MCNC: 115 MG/DL
POTASSIUM SERPL-SCNC: 4.4 MMOL/L
SODIUM SERPL-SCNC: 141 MMOL/L
TRIGL SERPL-MCNC: 70 MG/DL

## 2017-08-03 PROCEDURE — 80048 BASIC METABOLIC PNL TOTAL CA: CPT

## 2017-08-03 PROCEDURE — 36415 COLL VENOUS BLD VENIPUNCTURE: CPT

## 2017-08-03 PROCEDURE — 83036 HEMOGLOBIN GLYCOSYLATED A1C: CPT

## 2017-08-03 PROCEDURE — 80061 LIPID PANEL: CPT

## 2017-08-03 NOTE — TELEPHONE ENCOUNTER
Spoke with Aubrey regarding DOS. Aubrey stated that since pt is scheduled for a FU with Dr. Stacy on 8/10/17, that'll be enough time for clearance for cataract surgery.

## 2017-08-03 NOTE — TELEPHONE ENCOUNTER
----- Message from Aubrey Biswas sent at 8/3/2017  3:42 PM CDT -----  Contact: Aubrey Biswas  Mr. Mcqueen is scheduled for cataract surgery with Dr. Montaño on Wednesday, September 20, 2017.  Pt will need medical clearance for this procedure.  Surgery is scheduled with local/MAC anesthesia. Please call if you have any questions.    Thanks,   Aubrey Biswas  Surgery Coordinator  Ophthalmology  [facility] [[id number]]  [id number]

## 2017-08-04 LAB
ESTIMATED AVG GLUCOSE: 137 MG/DL
HBA1C MFR BLD HPLC: 6.4 %

## 2017-08-10 ENCOUNTER — OFFICE VISIT (OUTPATIENT)
Dept: INTERNAL MEDICINE | Facility: CLINIC | Age: 67
End: 2017-08-10
Payer: MEDICARE

## 2017-08-10 VITALS
DIASTOLIC BLOOD PRESSURE: 62 MMHG | SYSTOLIC BLOOD PRESSURE: 112 MMHG | WEIGHT: 218 LBS | HEART RATE: 73 BPM | HEIGHT: 73 IN | BODY MASS INDEX: 28.89 KG/M2 | OXYGEN SATURATION: 99 %

## 2017-08-10 DIAGNOSIS — I10 HYPERTENSION, ESSENTIAL: ICD-10-CM

## 2017-08-10 DIAGNOSIS — Z01.818 PREOPERATIVE CLEARANCE: Primary | ICD-10-CM

## 2017-08-10 DIAGNOSIS — E11.22 TYPE 2 DIABETES MELLITUS WITH DIABETIC CHRONIC KIDNEY DISEASE, UNSPECIFIED CKD STAGE, UNSPECIFIED LONG TERM INSULIN USE STATUS: Chronic | ICD-10-CM

## 2017-08-10 DIAGNOSIS — E78.5 HYPERLIPIDEMIA, UNSPECIFIED HYPERLIPIDEMIA TYPE: ICD-10-CM

## 2017-08-10 DIAGNOSIS — Z79.4 TYPE 2 DIABETES MELLITUS WITH DIABETIC POLYNEUROPATHY, WITH LONG-TERM CURRENT USE OF INSULIN: Chronic | ICD-10-CM

## 2017-08-10 DIAGNOSIS — E11.42 TYPE 2 DIABETES MELLITUS WITH DIABETIC POLYNEUROPATHY, WITH LONG-TERM CURRENT USE OF INSULIN: Chronic | ICD-10-CM

## 2017-08-10 DIAGNOSIS — H25.10 SENILE NUCLEAR SCLEROSIS, UNSPECIFIED LATERALITY: ICD-10-CM

## 2017-08-10 PROCEDURE — 1159F MED LIST DOCD IN RCRD: CPT | Mod: ,,, | Performed by: FAMILY MEDICINE

## 2017-08-10 PROCEDURE — 3066F NEPHROPATHY DOC TX: CPT | Mod: ,,, | Performed by: FAMILY MEDICINE

## 2017-08-10 PROCEDURE — 1126F AMNT PAIN NOTED NONE PRSNT: CPT | Mod: ,,, | Performed by: FAMILY MEDICINE

## 2017-08-10 PROCEDURE — 3008F BODY MASS INDEX DOCD: CPT | Mod: ,,, | Performed by: FAMILY MEDICINE

## 2017-08-10 PROCEDURE — 3044F HG A1C LEVEL LT 7.0%: CPT | Mod: ,,, | Performed by: FAMILY MEDICINE

## 2017-08-10 PROCEDURE — 99999 PR PBB SHADOW E&M-EST. PATIENT-LVL III: CPT | Mod: PBBFAC,,, | Performed by: FAMILY MEDICINE

## 2017-08-10 PROCEDURE — 99214 OFFICE O/P EST MOD 30 MIN: CPT | Mod: S$PBB,,, | Performed by: FAMILY MEDICINE

## 2017-08-10 PROCEDURE — 3078F DIAST BP <80 MM HG: CPT | Mod: ,,, | Performed by: FAMILY MEDICINE

## 2017-08-10 PROCEDURE — 3074F SYST BP LT 130 MM HG: CPT | Mod: ,,, | Performed by: FAMILY MEDICINE

## 2017-08-10 PROCEDURE — 99213 OFFICE O/P EST LOW 20 MIN: CPT | Mod: PBBFAC | Performed by: FAMILY MEDICINE

## 2017-08-10 RX ORDER — PAROXETINE HYDROCHLORIDE 20 MG/1
20 TABLET, FILM COATED ORAL EVERY MORNING
COMMUNITY
End: 2017-12-27 | Stop reason: SDUPTHER

## 2017-08-10 NOTE — PROGRESS NOTES
Subjective:       Patient ID: Jamarcus Mcqueen is a 67 y.o. male.    Chief Complaint: Follow-up    patient referred for a preoperative clearance. No acute complaints today. Refer to EPIC history, meds, allergies and problem list.  Established patient follows up for management of chronic medical illnesses without complaints today. Please see ROS for interval problems and complaints.        Review of Systems   Constitutional: Negative for chills, fatigue and fever.   HENT: Negative for congestion and trouble swallowing.    Eyes: Negative for redness.   Respiratory: Negative for cough, chest tightness and shortness of breath.    Cardiovascular: Negative for chest pain, palpitations and leg swelling.   Gastrointestinal: Negative for abdominal pain and blood in stool.   Genitourinary: Negative for hematuria.   Musculoskeletal: Negative for arthralgias, back pain, gait problem, joint swelling, myalgias and neck pain.   Skin: Negative for color change and rash.   Neurological: Negative for tremors, speech difficulty, weakness, numbness and headaches.   Hematological: Negative for adenopathy. Does not bruise/bleed easily.   Psychiatric/Behavioral: Positive for dysphoric mood. Negative for behavioral problems, confusion and sleep disturbance. The patient is not nervous/anxious.        Objective:      Physical Exam   Constitutional: He appears well-developed and well-nourished.   HENT:   Head: Normocephalic.   Right Ear: Tympanic membrane, external ear and ear canal normal.   Left Ear: Tympanic membrane, external ear and ear canal normal.   Mouth/Throat: Oropharynx is clear and moist.   Eyes: Pupils are equal, round, and reactive to light.   Neck: Normal range of motion. Neck supple. Carotid bruit is not present. No thyromegaly present.   Cardiovascular: Normal rate, regular rhythm, normal heart sounds and intact distal pulses.  Exam reveals no gallop and no friction rub.    No murmur heard.  Pulmonary/Chest: Effort normal and  breath sounds normal.   Abdominal: Soft. Bowel sounds are normal. He exhibits no distension and no mass. There is no tenderness. There is no rebound and no guarding.   Musculoskeletal: Normal range of motion. He exhibits no edema or tenderness.   Lymphadenopathy:     He has no cervical adenopathy.   Neurological: He is alert. He has normal strength. He displays normal reflexes. No cranial nerve deficit or sensory deficit. Coordination and gait normal.   Skin: Skin is warm and dry.   Psychiatric: He has a normal mood and affect. His behavior is normal. Judgment and thought content normal.   Nursing note and vitals reviewed.      Assessment:       1. Preoperative clearance    2. Senile nuclear sclerosis, unspecified laterality    3. Type 2 diabetes mellitus with diabetic chronic kidney disease, unspecified CKD stage, unspecified long term insulin use status    4. Type 2 diabetes mellitus with diabetic polyneuropathy, with long-term current use of insulin    5. Hypertension, essential    6. Hyperlipidemia, unspecified hyperlipidemia type        Plan:   Jamarcus was seen today for follow-up.    Diagnoses and all orders for this visit:    Preoperative clearance    Senile nuclear sclerosis, unspecified laterality    Type 2 diabetes mellitus with diabetic chronic kidney disease, unspecified CKD stage, unspecified long term insulin use status    Type 2 diabetes mellitus with diabetic polyneuropathy, with long-term current use of insulin    Hypertension, essential    Hyperlipidemia, unspecified hyperlipidemia type      See meds, orders, follow up, routing and instructions sections of encounter.    See meds, orders, follow up and instructions sections of encounter. Clear for surgery and cc to referring physician.    Labs from 8/3 reviewed.

## 2017-08-17 ENCOUNTER — TELEPHONE (OUTPATIENT)
Dept: INTERNAL MEDICINE | Facility: CLINIC | Age: 67
End: 2017-08-17

## 2017-08-17 NOTE — TELEPHONE ENCOUNTER
----- Message from Una Wills sent at 8/17/2017  9:50 AM CDT -----  Contact: self/[phone]  Patient called in regards needing a prior authorization to [employer]  on generic medication ciclopirox. Please call and advise.       Thank you!!!

## 2017-09-08 ENCOUNTER — OFFICE VISIT (OUTPATIENT)
Dept: OPHTHALMOLOGY | Facility: CLINIC | Age: 67
End: 2017-09-08
Payer: MEDICARE

## 2017-09-08 DIAGNOSIS — Z96.1 PSEUDOPHAKIA OF LEFT EYE: ICD-10-CM

## 2017-09-08 DIAGNOSIS — E11.3592 CONTROLLED TYPE 2 DIABETES MELLITUS WITH LEFT EYE AFFECTED BY PROLIFERATIVE RETINOPATHY WITHOUT MACULAR EDEMA, WITH LONG-TERM CURRENT USE OF INSULIN: ICD-10-CM

## 2017-09-08 DIAGNOSIS — H40.1123 PRIMARY OPEN ANGLE GLAUCOMA OF LEFT EYE, SEVERE STAGE: ICD-10-CM

## 2017-09-08 DIAGNOSIS — Z79.4 CONTROLLED TYPE 2 DIABETES MELLITUS WITH LEFT EYE AFFECTED BY PROLIFERATIVE RETINOPATHY WITHOUT MACULAR EDEMA, WITH LONG-TERM CURRENT USE OF INSULIN: ICD-10-CM

## 2017-09-08 DIAGNOSIS — H40.1112 PRIMARY OPEN ANGLE GLAUCOMA OF RIGHT EYE, MODERATE STAGE: ICD-10-CM

## 2017-09-08 DIAGNOSIS — H25.11 NUCLEAR SCLEROTIC CATARACT OF RIGHT EYE: Primary | ICD-10-CM

## 2017-09-08 PROCEDURE — 99212 OFFICE O/P EST SF 10 MIN: CPT | Mod: PBBFAC | Performed by: OPHTHALMOLOGY

## 2017-09-08 PROCEDURE — 92136 OPHTHALMIC BIOMETRY: CPT | Mod: PBBFAC,RT | Performed by: OPHTHALMOLOGY

## 2017-09-08 PROCEDURE — 92134 CPTRZ OPH DX IMG PST SGM RTA: CPT | Mod: PBBFAC | Performed by: OPHTHALMOLOGY

## 2017-09-08 PROCEDURE — 99999 PR PBB SHADOW E&M-EST. PATIENT-LVL II: CPT | Mod: PBBFAC,,, | Performed by: OPHTHALMOLOGY

## 2017-09-08 PROCEDURE — 99499 UNLISTED E&M SERVICE: CPT | Mod: S$PBB,,, | Performed by: OPHTHALMOLOGY

## 2017-09-08 RX ORDER — PHENYLEPHRINE HYDROCHLORIDE 100 MG/ML
1 SOLUTION/ DROPS OPHTHALMIC
Status: CANCELLED | OUTPATIENT
Start: 2017-09-08

## 2017-09-08 RX ORDER — SODIUM CHLORIDE 9 MG/ML
INJECTION, SOLUTION INTRAVENOUS CONTINUOUS
Status: CANCELLED | OUTPATIENT
Start: 2017-09-08

## 2017-09-08 RX ORDER — LIDOCAINE HYDROCHLORIDE 10 MG/ML
1 INJECTION, SOLUTION EPIDURAL; INFILTRATION; INTRACAUDAL; PERINEURAL ONCE
Status: CANCELLED | OUTPATIENT
Start: 2017-09-08 | End: 2017-09-08

## 2017-09-08 RX ORDER — MOXIFLOXACIN 5 MG/ML
1 SOLUTION/ DROPS OPHTHALMIC
Status: CANCELLED | OUTPATIENT
Start: 2017-09-08

## 2017-09-08 RX ORDER — KETOROLAC TROMETHAMINE 5 MG/ML
1 SOLUTION OPHTHALMIC
Status: CANCELLED | OUTPATIENT
Start: 2017-09-08

## 2017-09-08 RX ORDER — TROPICAMIDE 10 MG/ML
1 SOLUTION/ DROPS OPHTHALMIC
Status: CANCELLED | OUTPATIENT
Start: 2017-09-08

## 2017-09-08 NOTE — PROGRESS NOTES
HPI     DLS: 7/31/17    Pt here for Pre-Op phaco w/IOL Right Eye- (Surgery is 9/20/17)    Meds: Timolol GFS qam ou            Latanopros qhs ou     Last edited by Billie Douglas on 9/8/2017  1:23 PM. (History)            Assessment /Plan     For exam results, see Encounter Report.    Nuclear sclerotic cataract of right eye    Primary open angle glaucoma of right eye, moderate stage    Primary open angle glaucoma of left eye, severe stage    Controlled type 2 diabetes mellitus with left eye affected by proliferative retinopathy without macular edema, with long-term current use of insulin    Pseudophakia of left eye        - Here for pre-op phaco/IOL od - 2nd eye - again set for near   -  Post-OP PHACO/IOL OS - 5/18/2016 - set for near vision -   WANTS TO BE SET FOR NEAR - IS A MYOPE OU AND LIKES TO REMOVE GLASSES TO DO UP CLOSE WORK ON SMALL ELECTRONIC GADGETS   AIM FOR -2.25 TO -2.50     1. POAG od moderate stage // POAG severe stage OS  Followed as a borderline glaucoma with ocular hypertension 2005 yo 2014     First HVF   2008   First photos   2011   Treatment / Drops started   7/2014              Dx made 4/2014             Family history    + sister /  + mom also a supect - Rx with gtts for a while then taken off        Glaucoma meds    none        H/O adverse rxn to glaucoma drops    none        LASERS    none        GLAUCOMA SURGERIES    none        OTHER EYE SURGERIES    none        CDR    0.8/0.9        Tbase    17-26 / 17-26        Tmax    26/26          Ttarget    ?             HVF    4 test 2008 to  2015 - full /nonspecific defects  od // marked gen dep os        Gonio    +3 ou (no NVI or NVA os - s/p avastin for PDR)        CCT    544/539        OCT    2 test 2013 to 2014 - RNFL - OD:dec. T/N/I // OS:dec. T/I, bord N        HRT    2 test ([place])  2008 to 2011 - MR - nl od // nl os                    3 test ([facility]) 2014 - 2016 - MR -  nl od // bord I os /// CDR 0.45 od // 0.56 os        Disc photos   "  2011 - OIS    - Ttoday    16/16  - Test done today    Pre-op phaco/IOL od - 2nd eye - set for near again      2  Nuclear sclerosis        3. Myopia, astigmatism, presbyopia  Glasses  Use to use CTL's [place] [facility] [place]     4. Diabetes mellitus, type 2   + PDR os - s/p avastin #1 10/19/2014 - [place]  S/P PRP OS   + BDR - od  Sees Jose Angel     5. VH OS- 2/2 DR - resolved  Saw M[place] - 9/3/2015   6. PC IOL os - 5/18/2016 -   -PCB00 18.5 - aimed for near vision per pts request (-2.50) // complex trypan blue        POAG - OS severe stage  POAG- OD moderate stage  + HVF loss os > OD  + OCT - RNFL changes ou  T base 17-26 ou  + fm hx - sister and ?mom  Cont  - Latanoprost - use OU again   IOP much better with addition of timolol 21/23 --> 14/13    PDR os and BDR od   S/P avastin os 10/19/2014   S/P PRP os   Seeing jose angel    -pt likes to remove his glasses and do his near vision work with out glasses   Was and  and still does a lot of small electronic repairs   OD  Pre - op is a  -2.50 sp eq  OS pre-op is a -3.25 sp eq   OS was set for  near vision at about a -2.25 to -2.50 - so it is "balanced with the right eye"     IOL calc OS   Aim for -2.25 to -2.50 (per pts request)   PCB00 18.5  AC IOL 15.5    S/p CE - os  - but 5/18/2016   ?? Visual potential os - has more adv glaucoma and also DR w/ a H/O VH os  / H/O avastin injection os   Doing well       Cont glaucoma meds  Timolol gfs ou q am   Latanoprost qhs od only - can re-start prn     PT C/O BLURRY VISION OD   + NS/CORTICAL CHANGES     IOL CALC OD - 2nd eye - use trypan blue - cortical spokes - ? Red reflex   Aim for -2.5 per patient request  PCB00 18.5  AC IOL 15.0    OCT mac 9/8/17 - no macular edema - h/o PDR and ME      FERMIN MANNING RECENTLY 7/2017 - RETINA STABLE WITHOUT MAC EDEMA OU    Pt will adjust (decrease)  his insulin the morning of surgery       I have seen and personally examined the patient.  I agree with the findings, assessment and " plan of the resident and/or fellow.

## 2017-09-08 NOTE — H&P
Subjective:       Patient ID: Jamarcus Mcqueen is a 67 y.o. male.    Chief Complaint: Pre-op Exam    HPI  Review of Systems   Constitutional: Negative.    HENT: Negative.    Eyes: Positive for visual disturbance.   Respiratory: Negative.    Cardiovascular: Negative.    Gastrointestinal: Negative.    Endocrine: Negative.    Genitourinary: Negative.    Musculoskeletal: Negative.      preop Cataract OD history of PDR OU  Objective:      Physical Exam   Constitutional: He is oriented to person, place, and time. He appears well-nourished.   HENT:   Head: Normocephalic and atraumatic.   Cardiovascular: Normal rate.    Pulmonary/Chest: Effort normal.   Neurological: He is alert and oriented to person, place, and time.   Skin: Skin is warm and dry.   Psychiatric: He has a normal mood and affect.       Assessment:       1. Nuclear sclerotic cataract of right eye    2. Primary open angle glaucoma of right eye, moderate stage    3. Primary open angle glaucoma of left eye, severe stage    4. Controlled type 2 diabetes mellitus with left eye affected by proliferative retinopathy without macular edema, with long-term current use of insulin    5. Pseudophakia of left eye        Plan:         Plan Phaco/IOL/ trypan blue OD 9/20/17 set for near

## 2017-09-13 ENCOUNTER — TELEPHONE (OUTPATIENT)
Dept: INTERNAL MEDICINE | Facility: CLINIC | Age: 67
End: 2017-09-13

## 2017-09-13 NOTE — TELEPHONE ENCOUNTER
----- Message from Una Wills sent at 9/12/2017  4:24 PM CDT -----  Contact: self/[phone]  Patient called in regards needing to talk with Dr Stacy about the prior authorization to [employer]  on generic medication ciclopirox. Please call and advise.    Thank you

## 2017-09-19 ENCOUNTER — TELEPHONE (OUTPATIENT)
Dept: OPHTHALMOLOGY | Facility: CLINIC | Age: 67
End: 2017-09-19

## 2017-09-19 NOTE — PRE ADMISSION SCREENING
Anesthesiology {review:08417} Case Review for Triage    Planned Procedure: Procedure(s) (LRB):  PHACOEMULSIFICATION-ASPIRATION-CATARACT (Right)  INSERTION-INTRAOCULAR LENS (IOL) (Right) (Right  Right)  Requested Anesthesia Type: Local MAC  Surgeon: Mandie Mueller MD  Known or anticipated Date of Surgery: 2017    Accessible information regarding current medical status:  Surgeon notes: {surgeon notes:}  Anesthesia questionnaire completed by patient: {anes questionnaire:}  Previous anesthesia records: {prev anes:}  Last PCP note: {PCP note age:8990}  Subspecialty notes: {subspecialty note age:}, {subspecialty:}  Subspecialty evaluation: {subspecialty note:}  Records from recent hospitalization: {recent hospitalization:}  Outside medical records: {medical records:}  RN preliminary phone screening: {EARLY SCREENING CALL:}  Medication list: {MEDICATION LIST:}    Risk analysis from chart review  Planned surgery / procedure risk classification:  {surgery risk:14403}  Functional Capacity, (based on chart review): {functional capacity:62669}  Predicted ASA Classification: {ASA class:91966}  Cardiac Status: {risk analysis:72213}  Other important medical co-morbidities: {non-cardiac morbidity:28785}  Testing Status:  {testin}    Plan  Testing:  {test plan:75216}  Phone call:  {phone plan:58625}  Anesthesia Visit:  {ane plan:93159}   Visit focus:  {ane indications:98329}  Consult:  {consults:35311}  Indications:  {med consults:55290}  Sub-specialist consult:   {subspecialty:14784}  Indications:   {specialty consult:82355}    Una Masters RN

## 2017-09-20 ENCOUNTER — SURGERY (OUTPATIENT)
Age: 67
End: 2017-09-20

## 2017-09-20 ENCOUNTER — ANESTHESIA EVENT (OUTPATIENT)
Dept: SURGERY | Facility: HOSPITAL | Age: 67
End: 2017-09-20
Payer: MEDICARE

## 2017-09-20 ENCOUNTER — ANESTHESIA (OUTPATIENT)
Dept: SURGERY | Facility: HOSPITAL | Age: 67
End: 2017-09-20
Payer: MEDICARE

## 2017-09-20 ENCOUNTER — HOSPITAL ENCOUNTER (OUTPATIENT)
Facility: HOSPITAL | Age: 67
Discharge: HOME | End: 2017-09-20
Attending: OPHTHALMOLOGY | Admitting: OPHTHALMOLOGY
Payer: MEDICARE

## 2017-09-20 VITALS
SYSTOLIC BLOOD PRESSURE: 126 MMHG | TEMPERATURE: 97 F | DIASTOLIC BLOOD PRESSURE: 71 MMHG | HEART RATE: 59 BPM | HEIGHT: 73 IN | OXYGEN SATURATION: 100 % | WEIGHT: 218 LBS | BODY MASS INDEX: 28.89 KG/M2 | RESPIRATION RATE: 18 BRPM

## 2017-09-20 DIAGNOSIS — H25.11 NUCLEAR SCLEROTIC CATARACT OF RIGHT EYE: Primary | ICD-10-CM

## 2017-09-20 LAB
POCT GLUCOSE: 132 MG/DL (ref 70–110)
POCT GLUCOSE: 66 MG/DL (ref 70–110)

## 2017-09-20 PROCEDURE — C9447 INJ, PHENYLEPHRINE KETOROLAC: HCPCS | Performed by: OPHTHALMOLOGY

## 2017-09-20 PROCEDURE — 36000706: Performed by: OPHTHALMOLOGY

## 2017-09-20 PROCEDURE — 36000707: Performed by: OPHTHALMOLOGY

## 2017-09-20 PROCEDURE — 82962 GLUCOSE BLOOD TEST: CPT | Performed by: OPHTHALMOLOGY

## 2017-09-20 PROCEDURE — 66982 XCAPSL CTRC RMVL CPLX WO ECP: CPT | Mod: RT,,, | Performed by: OPHTHALMOLOGY

## 2017-09-20 PROCEDURE — D9220A PRA ANESTHESIA: Mod: ANES,,, | Performed by: ANESTHESIOLOGY

## 2017-09-20 PROCEDURE — 25000003 PHARM REV CODE 250: Performed by: OPHTHALMOLOGY

## 2017-09-20 PROCEDURE — V2632 POST CHMBR INTRAOCULAR LENS: HCPCS | Performed by: OPHTHALMOLOGY

## 2017-09-20 PROCEDURE — 71000015 HC POSTOP RECOV 1ST HR: Performed by: OPHTHALMOLOGY

## 2017-09-20 PROCEDURE — 63600175 PHARM REV CODE 636 W HCPCS: Performed by: OPHTHALMOLOGY

## 2017-09-20 PROCEDURE — D9220A PRA ANESTHESIA: Mod: CRNA,,, | Performed by: NURSE ANESTHETIST, CERTIFIED REGISTERED

## 2017-09-20 PROCEDURE — 37000008 HC ANESTHESIA 1ST 15 MINUTES: Performed by: OPHTHALMOLOGY

## 2017-09-20 PROCEDURE — 63600175 PHARM REV CODE 636 W HCPCS: Performed by: NURSE ANESTHETIST, CERTIFIED REGISTERED

## 2017-09-20 PROCEDURE — 37000009 HC ANESTHESIA EA ADD 15 MINS: Performed by: OPHTHALMOLOGY

## 2017-09-20 DEVICE — LENS IOL ITEC PRELOAD 18.5D: Type: IMPLANTABLE DEVICE | Site: EYE | Status: FUNCTIONAL

## 2017-09-20 RX ORDER — MOXIFLOXACIN 5 MG/ML
1 SOLUTION/ DROPS OPHTHALMIC
Status: DISCONTINUED | OUTPATIENT
Start: 2017-09-20 | End: 2017-09-20 | Stop reason: HOSPADM

## 2017-09-20 RX ORDER — PREDNISOLONE ACETATE 10 MG/ML
SUSPENSION/ DROPS OPHTHALMIC
Status: DISCONTINUED | OUTPATIENT
Start: 2017-09-20 | End: 2017-09-20 | Stop reason: HOSPADM

## 2017-09-20 RX ORDER — LIDOCAINE HYDROCHLORIDE 20 MG/ML
JELLY TOPICAL
Status: DISCONTINUED | OUTPATIENT
Start: 2017-09-20 | End: 2017-09-20 | Stop reason: HOSPADM

## 2017-09-20 RX ORDER — MOXIFLOXACIN 5 MG/ML
SOLUTION/ DROPS OPHTHALMIC
Status: DISCONTINUED
Start: 2017-09-20 | End: 2017-09-20 | Stop reason: HOSPADM

## 2017-09-20 RX ORDER — SODIUM CHLORIDE 9 MG/ML
INJECTION, SOLUTION INTRAVENOUS CONTINUOUS
Status: DISCONTINUED | OUTPATIENT
Start: 2017-09-20 | End: 2017-09-20 | Stop reason: HOSPADM

## 2017-09-20 RX ORDER — MOXIFLOXACIN 5 MG/ML
SOLUTION/ DROPS OPHTHALMIC
Status: DISCONTINUED | OUTPATIENT
Start: 2017-09-20 | End: 2017-09-20 | Stop reason: HOSPADM

## 2017-09-20 RX ORDER — LIDOCAINE HYDROCHLORIDE 10 MG/ML
INJECTION, SOLUTION EPIDURAL; INFILTRATION; INTRACAUDAL; PERINEURAL
Status: DISCONTINUED | OUTPATIENT
Start: 2017-09-20 | End: 2017-09-20 | Stop reason: HOSPADM

## 2017-09-20 RX ORDER — TROPICAMIDE 10 MG/ML
SOLUTION/ DROPS OPHTHALMIC
Status: DISCONTINUED
Start: 2017-09-20 | End: 2017-09-20 | Stop reason: HOSPADM

## 2017-09-20 RX ORDER — ACETAMINOPHEN 325 MG/1
650 TABLET ORAL EVERY 6 HOURS PRN
Status: DISCONTINUED | OUTPATIENT
Start: 2017-09-20 | End: 2017-09-20 | Stop reason: HOSPADM

## 2017-09-20 RX ORDER — TROPICAMIDE 10 MG/ML
1 SOLUTION/ DROPS OPHTHALMIC
Status: DISCONTINUED | OUTPATIENT
Start: 2017-09-20 | End: 2017-09-20 | Stop reason: HOSPADM

## 2017-09-20 RX ORDER — PHENYLEPHRINE HYDROCHLORIDE 100 MG/ML
SOLUTION/ DROPS OPHTHALMIC
Status: DISCONTINUED
Start: 2017-09-20 | End: 2017-09-20 | Stop reason: HOSPADM

## 2017-09-20 RX ORDER — MIDAZOLAM HYDROCHLORIDE 1 MG/ML
INJECTION, SOLUTION INTRAMUSCULAR; INTRAVENOUS
Status: DISCONTINUED | OUTPATIENT
Start: 2017-09-20 | End: 2017-09-20

## 2017-09-20 RX ORDER — KETOROLAC TROMETHAMINE 5 MG/ML
SOLUTION OPHTHALMIC
Status: DISCONTINUED
Start: 2017-09-20 | End: 2017-09-20 | Stop reason: HOSPADM

## 2017-09-20 RX ORDER — SODIUM CHLORIDE 0.9 % (FLUSH) 0.9 %
3 SYRINGE (ML) INJECTION
Status: DISCONTINUED | OUTPATIENT
Start: 2017-09-20 | End: 2017-09-20 | Stop reason: HOSPADM

## 2017-09-20 RX ORDER — LIDOCAINE HYDROCHLORIDE 40 MG/ML
INJECTION, SOLUTION RETROBULBAR
Status: DISCONTINUED | OUTPATIENT
Start: 2017-09-20 | End: 2017-09-20 | Stop reason: HOSPADM

## 2017-09-20 RX ORDER — PHENYLEPHRINE HYDROCHLORIDE 100 MG/ML
1 SOLUTION/ DROPS OPHTHALMIC
Status: DISCONTINUED | OUTPATIENT
Start: 2017-09-20 | End: 2017-09-20 | Stop reason: HOSPADM

## 2017-09-20 RX ORDER — KETOROLAC TROMETHAMINE 5 MG/ML
1 SOLUTION OPHTHALMIC
Status: DISCONTINUED | OUTPATIENT
Start: 2017-09-20 | End: 2017-09-20 | Stop reason: HOSPADM

## 2017-09-20 RX ORDER — LIDOCAINE HYDROCHLORIDE 10 MG/ML
1 INJECTION, SOLUTION EPIDURAL; INFILTRATION; INTRACAUDAL; PERINEURAL ONCE
Status: COMPLETED | OUTPATIENT
Start: 2017-09-20 | End: 2017-09-20

## 2017-09-20 RX ORDER — FENTANYL CITRATE 50 UG/ML
INJECTION, SOLUTION INTRAMUSCULAR; INTRAVENOUS
Status: DISCONTINUED | OUTPATIENT
Start: 2017-09-20 | End: 2017-09-20

## 2017-09-20 RX ADMIN — MIDAZOLAM HYDROCHLORIDE 1 MG: 1 INJECTION, SOLUTION INTRAMUSCULAR; INTRAVENOUS at 09:09

## 2017-09-20 RX ADMIN — FENTANYL CITRATE 25 MCG: 50 INJECTION, SOLUTION INTRAMUSCULAR; INTRAVENOUS at 09:09

## 2017-09-20 RX ADMIN — PHENYLEPHRINE HYDROCHLORIDE 1 DROP: 100 SOLUTION/ DROPS OPHTHALMIC at 09:09

## 2017-09-20 RX ADMIN — LIDOCAINE HYDROCHLORIDE 0.5 ML: 20 JELLY TOPICAL at 10:09

## 2017-09-20 RX ADMIN — MOXIFLOXACIN HYDROCHLORIDE 5 DROP: 5 SOLUTION/ DROPS OPHTHALMIC at 10:09

## 2017-09-20 RX ADMIN — LIDOCAINE HYDROCHLORIDE 2 ML: 40 INJECTION, SOLUTION RETROBULBAR; TOPICAL at 10:09

## 2017-09-20 RX ADMIN — MOXIFLOXACIN HYDROCHLORIDE 1 DROP: 5 SOLUTION/ DROPS OPHTHALMIC at 09:09

## 2017-09-20 RX ADMIN — TRYPAN BLUE 0.5 ML: 0.3 INJECTION, SOLUTION INTRAOCULAR; OPHTHALMIC at 10:09

## 2017-09-20 RX ADMIN — KETOROLAC TROMETHAMINE 1 DROP: 5 SOLUTION OPHTHALMIC at 09:09

## 2017-09-20 RX ADMIN — SODIUM CHLORIDE: 0.9 INJECTION, SOLUTION INTRAVENOUS at 09:09

## 2017-09-20 RX ADMIN — LIDOCAINE HYDROCHLORIDE 1 ML: 10 INJECTION, SOLUTION EPIDURAL; INFILTRATION; INTRACAUDAL; PERINEURAL at 10:09

## 2017-09-20 RX ADMIN — LIDOCAINE HYDROCHLORIDE 10 MG: 10 INJECTION, SOLUTION EPIDURAL; INFILTRATION; INTRACAUDAL; PERINEURAL at 09:09

## 2017-09-20 RX ADMIN — TROPICAMIDE 1 DROP: 10 SOLUTION/ DROPS OPHTHALMIC at 09:09

## 2017-09-20 RX ADMIN — SODIUM CHONDROITIN SULFATE / SODIUM HYALURONATE 1.05 ML: 0.55-0.5 INJECTION INTRAOCULAR at 10:09

## 2017-09-20 RX ADMIN — PREDNISOLONE ACETATE 5 DROP: 10 SUSPENSION/ DROPS OPHTHALMIC at 10:09

## 2017-09-20 RX ADMIN — PHENYLEPHRINE AND KETOROLAC 1 EACH: 10.16; 2.88 INJECTION, SOLUTION, CONCENTRATE INTRAOCULAR at 11:09

## 2017-09-20 NOTE — DISCHARGE INSTRUCTIONS
Discharge Instructions for Cataract Surgery  A surgeon removed the cloudy lens in your eye and replaced it with a clear man-made lens. Be sure to have an adult family member or friend drive you home after surgery. Heres what you can expect following surgery and tips for a healthy recovery.  It is normal to have the following:  · Bruised or bloodshot eye for 7 days  · Itching and mild discomfort for several days  · Some fluid discharge  · Sensitivity to light  · Scratchy, sandlike feeling in the eye for 2 weeks  · Feeling tired, especially during the first 24 hours  Activity level  · Do not drive for 2 days or as instructed by your doctor.  · Do not drink alcohol for at least 24 hours.  · Avoid bending at the waist to  objects or lifting anything heavy for 2 days.  · Relax for the first 24 hours after surgery. Watching TV and reading are OK and wont harm your eye.  Eye protection  · Do not rub or press on your eye.  · Sleep on your back or on your unoperated side for 2 nights.  · If instructed, wear a bandage over your eye for 2 days and 2 nights.  · If instructed, wear a shield to protect your eye for 2 days and 2 nights.  Using eyedrops  You may be given special eyedrops or ointment. Here is one way to use eyedrops:  · Tilt your head back.  · Pull your bottom eyelid down.  · Squeeze one drop into your eye. Do not touch your eye with the bottle tip.  · Close your eyes for a few seconds.  · If you need more than one drop, wait a few minutes before adding the next one.   Call your doctor right away if you have any of the following:  · Bleeding or discharge from the eye  · Your vision suddenly becomes worse  · Pain medicine you are told to take does not ease your pain  · Nausea or vomiting  · Chills or fever over 100.4°F (39.1°C)   Date Last Reviewed: 5/30/2015  © 6199-4238 [employer]. [address] [place] [address], [place] [address]. All rights reserved. This information is not intended as a  substitute for professional medical care. Always follow your healthcare professional's instructions.

## 2017-09-20 NOTE — ANESTHESIA PREPROCEDURE EVALUATION
09/20/2017  Jamarcus Mcqueen is a 67 y.o., male.    Pre-op Assessment    I have reviewed the Patient Summary Reports.      I have reviewed the Medications.     Review of Systems  Anesthesia Hx:  No problems with previous Anesthesia    Social:  Non-Smoker, No Alcohol Use    Hematology/Oncology:  Hematology Normal   Oncology Normal     EENT/Dental:  EENT/Dental Normal Eyes: Eye Disease: Glaucoma: Open Angle     Cardiovascular:   Exercise tolerance: good Hypertension hyperlipidemia    Pulmonary:  Pulmonary Normal    Renal/:   Chronic Renal Disease, CRI    Neurological:  Neurology Normal    Endocrine:   Diabetes, type 2    Psych:   depression          Physical Exam  General:  Well nourished    Airway/Jaw/Neck:  Airway Findings: Mouth Opening: Normal Tongue: Normal  Mallampati: II  TM Distance: Normal, at least 6 cm      Dental:  Dental Findings: In tact   Chest/Lungs:  Chest/Lungs Findings: Clear to auscultation, Normal Respiratory Rate     Heart/Vascular:  Heart Findings: Rate: Normal  Rhythm: Regular Rhythm        Mental Status:  Mental Status Findings:  Cooperative, Alert and Oriented         Anesthesia Plan  Type of Anesthesia, risks & benefits discussed:  Anesthesia Type:  MAC  Patient's Preference: same   Intra-op Monitoring Plan:   Intra-op Monitoring Plan Comments:   Post Op Pain Control Plan:   Post Op Pain Control Plan Comments:   Induction:   IV  Beta Blocker:  Patient is not currently on a Beta-Blocker (No further documentation required).       Informed Consent: Patient understands risks and agrees with Anesthesia plan.  Questions answered. Anesthesia consent signed with patient.  ASA Score: 3     Day of Surgery Review of History & Physical:    H&P update referred to the surgeon.         Ready For Surgery From Anesthesia Perspective.

## 2017-09-20 NOTE — ANESTHESIA POSTPROCEDURE EVALUATION
"Anesthesia Post Evaluation    Patient: Jamarcus Mcqueen    Procedure(s) Performed: Procedure(s) (LRB):  PHACOEMULSIFICATION-ASPIRATION-CATARACT (Right)  INSERTION-INTRAOCULAR LENS (IOL) (Right)    Final Anesthesia Type: MAC  Patient location during evaluation: OPS  Patient participation: Yes- Able to Participate  Level of consciousness: awake and alert and oriented  Post-procedure vital signs: reviewed and stable  Pain management: adequate  Airway patency: patent  PONV status at discharge: No PONV  Anesthetic complications: no      Cardiovascular status: blood pressure returned to baseline and stable  Respiratory status: unassisted, spontaneous ventilation and room air  Hydration status: euvolemic  Follow-up not needed.        Visit Vitals  /71   Pulse (!) 59   Temp 36.3 °C (97.3 °F) (Temporal)   Resp 18   Ht 6' 1" (1.854 m)   Wt 98.9 kg (218 lb)   SpO2 100%   BMI 28.76 kg/m²       Pain/Do Score: Pain Assessment Performed: Yes (9/20/2017 10:54 AM)  Presence of Pain: denies (9/20/2017 10:54 AM)  Do Score: 9 (9/20/2017 10:50 AM)      "

## 2017-09-20 NOTE — OP NOTE
DATE OF PROCEDURE: 9/20/2017    PREOPERATIVE DIAGNOSIS: Complex Cataract - poor red reflex - cortical spokes // Nuclear sclerotic cataract of right eye OD.     POSTOPERATIVE DIAGNOSIS: Complex Cataract - poor red reflex - cortical spokes // Nuclear sclerotic cataract of right eyeOD, status post procedure.     PROCEDURE PERFORMED: Complex Cataract extraction with trypan blue and  phacoemulsification, posterior   chamber intraocular lens placement.     SURGEON: Mandie Mueller M.D.     ASSISTANT: none     COMPLICATIONS: None.     BLOOD LOSS: Less than 5 mL.     ANESTHESIA: Local MAC    IMPLANT DATA:   1. [employer] PCB00 , power 18.5 diopters, serial #[id number]    PROCEDURE IN DETAIL: After informed consent including risks, benefits,   alternatives, the patient was brought to the operating room table, placed in   supine position. Monitored anesthesia care was used throughout the entire   procedure. Once adequate anesthesia was confirmed, the patient was then prepped   and draped in usual sterile fashion for intraocular surgery. Wire speculum was   used to hold the eyelids apart and the procedure was initiated by making   a partial thickness corneal wound with a sandrita blade temporally.   A supersharp blade was then used to make a second entry into the eye via a paracentesis incision.  Intracameral lidocaine followed by trypan blue, followed by  Viscoat were placed in the anterior chamber.   A 2.4 mm blade was then used to make to complete the clear corneal   incision temporally. A cystotome was used to make a tear in   the anterior capsular flap, which was continued around with Utrata forceps for   continuous curvilinear capsulorrhexis. BSS on a Reece cannula was then used for   hydrodissection and hydrodelineation of lens. The lens was noted to spin   freely in the bag. Phacoemulsification handpiece was then used to remove the   lens in a divide-and-conquer manner. Irrigation aspiration handpiece removed   the  remaining cortical material. Provisc was placed in the eye followed by the   lens as mentioned above without any complications. Once the lens was in proper   position, irrigation aspiration handpiece was used to remove the remaining Provisc. The   wounds were then hydrated with BSS and noted to be watertight. The eye had a   good physiological pressure and the lid speculum was removed under the   microscope without any shallowing. The eye was then covered with a collagen   shield soaked in Pred Forte and Vigamox and turned over to Anesthesia in stable   condition after placement of patch and shield.

## 2017-09-20 NOTE — DISCHARGE SUMMARY
Date of Procedure / Discharge: 09/20/2017     PreOp Diagnosis: Complex Cataract OD      PostOp Diagnosis:as above s/p procedure    Procedure:Complex Cataract Extraction with Phacoemulsification and trypan blue and  w/ Posterior Chamber IOL Implantation    Attending:Mandie Mueller MD    Assistant:none     Anesthesia:Local MAC    Complications:: None    Blood loss: <5 CC    Specimens: none    Procedure Details:  See Dictation      -D/C home when patient meets anesthesia requirements  -Follow up tomorrow with surgeon in the [facility].

## 2017-09-20 NOTE — INTERVAL H&P NOTE
H&P completed on 9/8/17 has been reviewed, the patient examined and I concur with the findings and plan.

## 2017-09-20 NOTE — PLAN OF CARE
Pt and family given dc instructions and verbalized understanding.   1030- Dr. Leopold notified pt BS 66. PT no S&S of distress noted. Apple juice given. Will retake BS. Per DR. Leopold, anesthesia, pt may be dc hm if BS retaken w/in normal range.   1050- . Pt in no distress.

## 2017-09-20 NOTE — TRANSFER OF CARE
"Anesthesia Transfer of Care Note    Patient: Jamarcus Mcqueen    Procedure(s) Performed: Procedure(s) (LRB):  PHACOEMULSIFICATION-ASPIRATION-CATARACT (Right)  INSERTION-INTRAOCULAR LENS (IOL) (Right)    Patient location: PACU    Anesthesia Type: MAC    Transport from OR: Transported from OR on room air with adequate spontaneous ventilation    Post pain: adequate analgesia    Post assessment: no apparent anesthetic complications and tolerated procedure well    Post vital signs: stable    Level of consciousness: awake, alert and oriented    Nausea/Vomiting: no nausea/vomiting    Complications: none    Transfer of care protocol was followed      Last vitals:   Visit Vitals  /73   Pulse 60   Temp 36.3 °C (97.3 °F) (Oral)   Resp 18   Ht 6' 1" (1.854 m)   Wt 98.9 kg (218 lb)   SpO2 98%   BMI 28.76 kg/m²     "

## 2017-09-21 ENCOUNTER — OFFICE VISIT (OUTPATIENT)
Dept: OPHTHALMOLOGY | Facility: CLINIC | Age: 67
End: 2017-09-21
Payer: MEDICARE

## 2017-09-21 DIAGNOSIS — E11.3592 CONTROLLED TYPE 2 DIABETES MELLITUS WITH LEFT EYE AFFECTED BY PROLIFERATIVE RETINOPATHY WITHOUT MACULAR EDEMA, WITH LONG-TERM CURRENT USE OF INSULIN: ICD-10-CM

## 2017-09-21 DIAGNOSIS — Z96.1 PSEUDOPHAKIA, BOTH EYES: ICD-10-CM

## 2017-09-21 DIAGNOSIS — H40.1123 PRIMARY OPEN ANGLE GLAUCOMA OF LEFT EYE, SEVERE STAGE: ICD-10-CM

## 2017-09-21 DIAGNOSIS — H40.1112 PRIMARY OPEN ANGLE GLAUCOMA OF RIGHT EYE, MODERATE STAGE: ICD-10-CM

## 2017-09-21 DIAGNOSIS — Z79.4 CONTROLLED TYPE 2 DIABETES MELLITUS WITH LEFT EYE AFFECTED BY PROLIFERATIVE RETINOPATHY WITHOUT MACULAR EDEMA, WITH LONG-TERM CURRENT USE OF INSULIN: ICD-10-CM

## 2017-09-21 DIAGNOSIS — Z98.49 POSTOPERATIVE CARE FOR CATARACT: Primary | ICD-10-CM

## 2017-09-21 DIAGNOSIS — Z48.810 POSTOPERATIVE CARE FOR CATARACT: Primary | ICD-10-CM

## 2017-09-21 PROCEDURE — 99024 POSTOP FOLLOW-UP VISIT: CPT | Mod: ,,, | Performed by: OPHTHALMOLOGY

## 2017-09-21 PROCEDURE — 99212 OFFICE O/P EST SF 10 MIN: CPT | Mod: PBBFAC | Performed by: OPHTHALMOLOGY

## 2017-09-21 PROCEDURE — 99999 PR PBB SHADOW E&M-EST. PATIENT-LVL II: CPT | Mod: PBBFAC,,, | Performed by: OPHTHALMOLOGY

## 2017-09-21 NOTE — PROGRESS NOTES
HPI     DLS: 9/8/17      Pt returns for 1 day po phaco IOL OD. Pt complaints of minor pain, more   like pressure he said.     Meds: Timolol GFS qam ou             Latanopros qhs ou  - hold right eye post phaco/IOL 9/20/2017     POHx:   1. VH OS     2. DM - PDR OS   S/p Avastin x 3   S/p PRP OS     3. Mod NPDR OD     4. NS OD   S/P complex phaco w/IOL OS- 5/18/16     5. Pigmented Lesion     Last edited by Mandie Mueller MD on 9/21/2017 10:15 AM. (History)            Assessment /Plan     For exam results, see Encounter Report.    Postoperative care for cataract    Primary open angle glaucoma of right eye, moderate stage    Primary open angle glaucoma of left eye, severe stage    Controlled type 2 diabetes mellitus with left eye affected by proliferative retinopathy without macular edema, with long-term current use of insulin    Pseudophakia, both eyes        - Here for post - op phaco/IOL od - 2nd eye - again set for near     -  Post-OP PHACO/IOL OS - 5/18/2016 - set for near vision -   WANTS TO BE SET FOR NEAR - IS A MYOPE OU AND LIKES TO REMOVE GLASSES TO DO UP CLOSE WORK ON SMALL ELECTRONIC GADGETS   AIM FOR -2.25 TO -2.50     1. POAG od moderate stage // POAG severe stage OS  Followed as a borderline glaucoma with ocular hypertension [age] yo 2014     First HVF   2008   First photos   2011   Treatment / Drops started   7/2014              Dx made 4/2014             Family history    + sister /  + mom also a supect - Rx with gtts for a while then taken off        Glaucoma meds    none        H/O adverse rxn to glaucoma drops    none        LASERS    none        GLAUCOMA SURGERIES    none        OTHER EYE SURGERIES    Phaco/IOL OD 9/20/2017 Phaco/IOL OS 5/18/2016 - set for near OU        CDR    0.8/0.9        Tbase    17-26 / 17-26        Tmax    26/26          Ttarget    ?             HVF    4 test 2008 to  2015 - full /nonspecific defects  od // marked gen dep os        Gonio    +3 ou (no NVI or NVA os - s/p  "avastin for PDR)        CCT    544/539        OCT    2 test 2013 to 2014 - RNFL - OD:dec. T/N/I // OS:dec. T/I, bord N        HRT    2 test ([place])  2008 to 2011 - MR - nl od // nl os                    3 test ([facility]) 2014 - 2016 - MR -  nl od // bord I os /// CDR 0.45 od // 0.56 os        Disc photos    2011 - OIS    - Ttoday   22/18  - Test done today    Post-op phaco/IOL od - 2nd eye - set for near again      3. Myopia, astigmatism, presbyopia  Glasses  Use to use [facility] [place] [facility] [place]     4. Diabetes mellitus, type 2   + PDR os - s/p avastin #1 10/19/2014 - [place]  S/P PRP OS   + BDR - od  Sees Jose Angel     5. VH OS- 2/2 DR - resolved  Saw Jose Angel - 9/3/2015   6. PC IOL os - 5/18/2016 -   -PCB00 18.5 - aimed for near vision per pts request (-2.50) // complex trypan blue   PC IOL od - 9/20/2017 - 18.5 - set for near / complex trypan blue        POAG - OS severe stage  POAG- OD moderate stage  + HVF loss os > OD  + OCT - RNFL changes ou  T base 17-26 ou  + fm hx - sister and ?mom  Cont  - Latanoprost - use OU again   IOP much better with addition of timolol 21/23 --> 14/13    PDR os and BDR od   S/P avastin os 10/19/2014   S/P PRP os   Seeing jose angel    -pt likes to remove his glasses and do his near vision work with out glasses   Was and  and still does a lot of small electronic repairs   OD  Pre - op is a  -2.50 sp eq  OS pre-op is a -3.25 sp eq   OS was set for  near vision at about a -2.25 to -2.50 - so it is "balanced with the right eye"     S/p CE - os  - 5/18/2016 - set for near  Doing well    Phaco / IOL OD Date: 9/20/2017 - Aim for -2.5D per patient request  ( PCB00 18.5 )   POD # 1 - phaco/IOL /Trypan   Doing well  Begin Pred Acetate QID   Begin vigamox QID  Shield at night  Glasses day  No lifting, no bending, no eye rubbing  F/U 1 week, AR/MR ou    Cont glaucoma meds  Timolol gfs ou q am   Latanoprost qhs os ONLY - HOLD oOD   OCT mac 9/8/17 - no macular edema - h/o PDR " and ME    SAW KERRI RECENTLY 7/2017 - RETINA STABLE WITHOUT MAC EDEMA OU      I have seen and personally examined the patient.  I agree with the findings, assessment and plan of the resident and/or fellow.

## 2017-09-22 ENCOUNTER — LAB VISIT (OUTPATIENT)
Dept: LAB | Facility: HOSPITAL | Age: 67
End: 2017-09-22
Attending: INTERNAL MEDICINE
Payer: MEDICARE

## 2017-09-22 ENCOUNTER — OFFICE VISIT (OUTPATIENT)
Dept: NEPHROLOGY | Facility: CLINIC | Age: 67
End: 2017-09-22
Payer: MEDICARE

## 2017-09-22 VITALS
HEART RATE: 73 BPM | BODY MASS INDEX: 29.8 KG/M2 | OXYGEN SATURATION: 98 % | HEIGHT: 73 IN | SYSTOLIC BLOOD PRESSURE: 120 MMHG | WEIGHT: 224.88 LBS | DIASTOLIC BLOOD PRESSURE: 80 MMHG

## 2017-09-22 DIAGNOSIS — E11.22 CONTROLLED TYPE 2 DIABETES MELLITUS WITH STAGE 3 CHRONIC KIDNEY DISEASE, WITH LONG-TERM CURRENT USE OF INSULIN: ICD-10-CM

## 2017-09-22 DIAGNOSIS — D63.1 ANEMIA OF CHRONIC RENAL FAILURE, STAGE 3 (MODERATE): ICD-10-CM

## 2017-09-22 DIAGNOSIS — I12.9 BENIGN HYPERTENSIVE RENAL DISEASE WITHOUT RENAL FAILURE: ICD-10-CM

## 2017-09-22 DIAGNOSIS — N18.30 ANEMIA OF CHRONIC RENAL FAILURE, STAGE 3 (MODERATE): ICD-10-CM

## 2017-09-22 DIAGNOSIS — Z79.4 CONTROLLED TYPE 2 DIABETES MELLITUS WITH STAGE 3 CHRONIC KIDNEY DISEASE, WITH LONG-TERM CURRENT USE OF INSULIN: Primary | ICD-10-CM

## 2017-09-22 DIAGNOSIS — E11.22 CONTROLLED TYPE 2 DIABETES MELLITUS WITH STAGE 3 CHRONIC KIDNEY DISEASE, WITH LONG-TERM CURRENT USE OF INSULIN: Primary | ICD-10-CM

## 2017-09-22 DIAGNOSIS — N18.30 CONTROLLED TYPE 2 DIABETES MELLITUS WITH STAGE 3 CHRONIC KIDNEY DISEASE, WITH LONG-TERM CURRENT USE OF INSULIN: Primary | ICD-10-CM

## 2017-09-22 DIAGNOSIS — Z79.4 CONTROLLED TYPE 2 DIABETES MELLITUS WITH STAGE 3 CHRONIC KIDNEY DISEASE, WITH LONG-TERM CURRENT USE OF INSULIN: ICD-10-CM

## 2017-09-22 DIAGNOSIS — N18.30 CONTROLLED TYPE 2 DIABETES MELLITUS WITH STAGE 3 CHRONIC KIDNEY DISEASE, WITH LONG-TERM CURRENT USE OF INSULIN: ICD-10-CM

## 2017-09-22 LAB
BILIRUB UR QL STRIP: NEGATIVE
CLARITY UR REFRACT.AUTO: CLEAR
COLOR UR AUTO: YELLOW
CREAT UR-MCNC: 105 MG/DL
GLUCOSE UR QL STRIP: NEGATIVE
HGB UR QL STRIP: NEGATIVE
KETONES UR QL STRIP: NEGATIVE
LEUKOCYTE ESTERASE UR QL STRIP: NEGATIVE
NITRITE UR QL STRIP: NEGATIVE
PH UR STRIP: 5 [PH] (ref 5–8)
PROT UR QL STRIP: NEGATIVE
PROT UR-MCNC: <7 MG/DL
PROT/CREAT RATIO, UR: NORMAL
SP GR UR STRIP: 1.01 (ref 1–1.03)
URN SPEC COLLECT METH UR: NORMAL
UROBILINOGEN UR STRIP-ACNC: NEGATIVE EU/DL

## 2017-09-22 PROCEDURE — 3079F DIAST BP 80-89 MM HG: CPT | Mod: ,,, | Performed by: INTERNAL MEDICINE

## 2017-09-22 PROCEDURE — 82570 ASSAY OF URINE CREATININE: CPT

## 2017-09-22 PROCEDURE — 1159F MED LIST DOCD IN RCRD: CPT | Mod: ,,, | Performed by: INTERNAL MEDICINE

## 2017-09-22 PROCEDURE — 1126F AMNT PAIN NOTED NONE PRSNT: CPT | Mod: ,,, | Performed by: INTERNAL MEDICINE

## 2017-09-22 PROCEDURE — 99213 OFFICE O/P EST LOW 20 MIN: CPT | Mod: S$PBB,,, | Performed by: INTERNAL MEDICINE

## 2017-09-22 PROCEDURE — 99213 OFFICE O/P EST LOW 20 MIN: CPT | Mod: PBBFAC | Performed by: INTERNAL MEDICINE

## 2017-09-22 PROCEDURE — 81003 URINALYSIS AUTO W/O SCOPE: CPT

## 2017-09-22 PROCEDURE — 99999 PR PBB SHADOW E&M-EST. PATIENT-LVL III: CPT | Mod: PBBFAC,,, | Performed by: INTERNAL MEDICINE

## 2017-09-22 PROCEDURE — 3074F SYST BP LT 130 MM HG: CPT | Mod: ,,, | Performed by: INTERNAL MEDICINE

## 2017-09-26 LAB — POCT GLUCOSE: 80 MG/DL (ref 70–110)

## 2017-09-27 ENCOUNTER — TELEPHONE (OUTPATIENT)
Dept: INTERNAL MEDICINE | Facility: CLINIC | Age: 67
End: 2017-09-27

## 2017-09-27 DIAGNOSIS — B35.1 ONYCHOMYCOSIS: Primary | ICD-10-CM

## 2017-09-27 NOTE — TELEPHONE ENCOUNTER
----- Message from Michelle Ferris sent at 9/27/2017  2:55 PM CDT -----  Contact: self [phone]  Type: Rx    Name of medication(s): ciclopirox (PENLAC) 8 % Soln     Is this a refill? New rx?    Who prescribed medication? Regis    Pharmacy Name, Phone, & Location: [facility] - [address] [phone] (Phone)[phone] (Fax)    Comments: patient's insurance is not covering efinaconazole (JUBLIA) 10 % Armen, and he has'nt been able to get it since Aug and pharmacy didn't notified him until today when he called them.    Thank you

## 2017-09-28 ENCOUNTER — OFFICE VISIT (OUTPATIENT)
Dept: OPHTHALMOLOGY | Facility: CLINIC | Age: 67
End: 2017-09-28
Payer: MEDICARE

## 2017-09-28 DIAGNOSIS — Z79.4 CONTROLLED TYPE 2 DIABETES MELLITUS WITH LEFT EYE AFFECTED BY PROLIFERATIVE RETINOPATHY WITHOUT MACULAR EDEMA, WITH LONG-TERM CURRENT USE OF INSULIN: ICD-10-CM

## 2017-09-28 DIAGNOSIS — H40.1123 PRIMARY OPEN ANGLE GLAUCOMA OF LEFT EYE, SEVERE STAGE: ICD-10-CM

## 2017-09-28 DIAGNOSIS — H40.1112 PRIMARY OPEN ANGLE GLAUCOMA OF RIGHT EYE, MODERATE STAGE: ICD-10-CM

## 2017-09-28 DIAGNOSIS — Z98.49 POSTOPERATIVE CARE FOR CATARACT: Primary | ICD-10-CM

## 2017-09-28 DIAGNOSIS — E11.3592 CONTROLLED TYPE 2 DIABETES MELLITUS WITH LEFT EYE AFFECTED BY PROLIFERATIVE RETINOPATHY WITHOUT MACULAR EDEMA, WITH LONG-TERM CURRENT USE OF INSULIN: ICD-10-CM

## 2017-09-28 DIAGNOSIS — Z48.810 POSTOPERATIVE CARE FOR CATARACT: Primary | ICD-10-CM

## 2017-09-28 PROCEDURE — 99212 OFFICE O/P EST SF 10 MIN: CPT | Mod: PBBFAC | Performed by: OPHTHALMOLOGY

## 2017-09-28 PROCEDURE — 99024 POSTOP FOLLOW-UP VISIT: CPT | Mod: ,,, | Performed by: OPHTHALMOLOGY

## 2017-09-28 PROCEDURE — 99999 PR PBB SHADOW E&M-EST. PATIENT-LVL II: CPT | Mod: PBBFAC,,, | Performed by: OPHTHALMOLOGY

## 2017-09-28 RX ORDER — PREDNISOLONE ACETATE 10 MG/ML
1 SUSPENSION/ DROPS OPHTHALMIC 4 TIMES DAILY
Qty: 10 ML | Refills: 1 | Status: SHIPPED | OUTPATIENT
Start: 2017-09-28 | End: 2017-10-19 | Stop reason: SDUPTHER

## 2017-09-28 NOTE — PROGRESS NOTES
HPI     Post-op Evaluation    Additional comments: Pt states OD is doing well           Comments   S/p Phaco IOL OD 9/20/17    MEDS:   PA QID OD  Vigamox QID OD  Timolol GFS QAM OU  Latanoprost QHS OS       Last edited by Vandana Caballero MA on 9/28/2017 11:42 AM. (History)            Assessment /Plan     For exam results, see Encounter Report.    Postoperative care for cataract    Primary open angle glaucoma of right eye, moderate stage    Primary open angle glaucoma of left eye, severe stage    Controlled type 2 diabetes mellitus with left eye affected by proliferative retinopathy without macular edema, with long-term current use of insulin          - Here for post - op phaco/IOL od - 2nd eye - again set for near     -  Post-OP PHACO/IOL OS - 5/18/2016 - set for near vision -   WANTS TO BE SET FOR NEAR - IS A MYOPE OU AND LIKES TO REMOVE GLASSES TO DO UP CLOSE WORK ON SMALL ELECTRONIC GADGETS   AIM FOR -2.25 TO -2.50     1. POAG od moderate stage // POAG severe stage OS  Followed as a borderline glaucoma with ocular hypertension [age] yo 2014     First HVF   2008   First photos   2011   Treatment / Drops started   7/2014              Dx made 4/2014             Family history    + sister /  + mom also a supect - Rx with gtts for a while then taken off        Glaucoma meds    none        H/O adverse rxn to glaucoma drops    none        LASERS    none        GLAUCOMA SURGERIES    none        OTHER EYE SURGERIES    Phaco/IOL OD 9/20/2017 Phaco/IOL OS 5/18/2016 - set for near OU        CDR    0.8/0.9        Tbase    17-26 / 17-26        Tmax    26/26          Ttarget    ?             HVF    4 test 2008 to  2015 - full /nonspecific defects  od // marked gen dep os        Gonio    +3 ou (no NVI or NVA os - s/p avastin for PDR)        CCT    544/539        OCT    2 test 2013 to 2014 - RNFL - OD:dec. T/N/I // OS:dec. T/Iayanna        HRT    2 test ([place])  2008 to 2011 - MR - nl od // nl os                    3 test ([facility]  "[place]) 2014 - 2016 - MR -  nl od // bord I os /// CDR 0.45 od // 0.56 os        Disc photos    2011 - OIS    - Ttoday   20/16  - Test done today    Post-op phaco/IOL od - 2nd eye - set for near again      3. Myopia, astigmatism, presbyopia  Glasses  Use to use [facility] [place] [facility] [place]     4. Diabetes mellitus, type 2   + PDR os - s/p avastin #1 10/19/2014 - [place]  S/P PRP OS   + BDR - od  Sees Kerri     5. VH OS- 2/2 DR - roger  Saw Kerri - 9/3/2015   6. PC IOL os - 5/18/2016 -   -PCB00 18.5 - aimed for near vision per pts request (-2.50) // complex trypan blue   PC IOL od - 9/20/2017 - 18.5 - set for near / complex trypan blue        POAG - OS severe stage  POAG- OD moderate stage  + HVF loss os > OD  + OCT - RNFL changes ou  T base 17-26 ou  + fm hx - sister and ?mom  Cont  - Latanoprost - use OU again   IOP much better with addition of timolol 21/23 --> 14/13    PDR os and BDR od   S/P avastin os 10/19/2014   S/P PRP os   Seeing kerri    -pt likes to remove his glasses and do his near vision work with out glasses   Was and  and still does a lot of small electronic repairs   OD  Pre - op is a  -2.50 sp eq  OS pre-op is a -3.25 sp eq   OS was set for  near vision at about a -2.25 to -2.50 - so it is "balanced with the right eye"     S/p CE - os  - 5/18/2016 - set for near  Doing well    Phaco / IOL OD Date: 9/20/2017 - Aim for -2.5D per patient request  ( PCB00 18.5 )   POW # 1 - phaco/IOL /Trypan   Doing well  Pred Acetate QID - continue QID - still with iritis   vigamox QID until out  Shield at night  Glasses day  No lifting, no bending, no eye rubbing      Cont glaucoma meds  Timolol gfs ou q am   Latanoprost qhs os ONLY - HOLD oOD   OCT mac 9/8/17 - no macular edema - h/o PDR and ME    SAW KERRI RECENTLY 7/2017 - RETINA STABLE WITHOUT MAC EDEMA OU    F/u 3 weeks, Post op AR/MR    I have seen and personally examined the patient.  I agree with the findings, assessment and plan " of the resident and/or fellow.

## 2017-09-29 NOTE — PROGRESS NOTES
Subjective:       Patient ID: Jamarcus Mcqueen is a 67 y.o. Black or  male who presents for follow-up evaluation of Chronic Kidney Disease    HPI     Mr. Mcqueen is a 67 year old man with past medical history of diabetes, hypertension presenting for follow up of chronic kidney disease.  Patient reports blood sugars, blood pressure well-controlled.  He reports adequate fluid intake, denies any NSAID use.  He otherwise denies any fever, chest pain, shortness of breath, abdominal pain, diarrhea, dysuria/hematuria.     Review of Systems   Constitutional: Negative for appetite change, fatigue and fever.   Respiratory: Negative for cough and shortness of breath.    Cardiovascular: Negative for chest pain and leg swelling.   Gastrointestinal: Negative for abdominal pain, constipation, diarrhea, nausea and vomiting.   Genitourinary: Negative for dysuria, flank pain, frequency, hematuria and urgency.   Musculoskeletal: Negative for arthralgias, back pain and joint swelling.   Skin: Negative for rash.   Neurological: Negative for dizziness and light-headedness.   All other systems reviewed and are negative.      Objective:      Physical Exam   Constitutional: He appears well-developed and well-nourished.   Cardiovascular: Normal rate and regular rhythm.  Exam reveals no gallop and no friction rub.    No murmur heard.  Pulmonary/Chest: Effort normal and breath sounds normal. No respiratory distress. He has no wheezes. He has no rales.   Musculoskeletal: He exhibits no edema.   Neurological: He is alert.   Skin: Skin is warm and dry. No rash noted.   Vitals reviewed.      Assessment:       1. Controlled type 2 diabetes mellitus with stage 3 chronic kidney disease, with long-term current use of insulin    2. Benign hypertensive renal disease without renal failure    3. Anemia of chronic renal failure, stage 3 (moderate)        Plan:      Mr. Mcqueen is a 67 year old man with past medical history of diabetes,  hypertension presenting for follow up of chronic kidney disease.  Patient creatinine 1.4-1.6mg/dL for the past several years, consistent with CKD stage II/III.  Creatinine relatively stable, proteinuria negligible on ARB, will continue to trend labs.  Stressed importance of blood pressure/glycemic control to prevent any further progression of kidney disease, patient voiced understanding.   - Anemia: Hgb at goal, no indication for erythropoetin therapy  - Bone/mineral metabolism: will trend PTH/VitD    Return to clinic in 6 months with renal/heme panel, iron/TIBC/ferritin, urinalysis/culture, urine protein/creatinine ratio, PTH/VitD prior to next visit

## 2017-10-19 ENCOUNTER — OFFICE VISIT (OUTPATIENT)
Dept: OPHTHALMOLOGY | Facility: CLINIC | Age: 67
End: 2017-10-19
Payer: MEDICARE

## 2017-10-19 DIAGNOSIS — Z79.4 CONTROLLED TYPE 2 DIABETES MELLITUS WITH LEFT EYE AFFECTED BY PROLIFERATIVE RETINOPATHY WITHOUT MACULAR EDEMA, WITH LONG-TERM CURRENT USE OF INSULIN: ICD-10-CM

## 2017-10-19 DIAGNOSIS — Z48.810 POSTOPERATIVE CARE FOR CATARACT: ICD-10-CM

## 2017-10-19 DIAGNOSIS — H40.1123 PRIMARY OPEN ANGLE GLAUCOMA OF LEFT EYE, SEVERE STAGE: ICD-10-CM

## 2017-10-19 DIAGNOSIS — E11.3592 CONTROLLED TYPE 2 DIABETES MELLITUS WITH LEFT EYE AFFECTED BY PROLIFERATIVE RETINOPATHY WITHOUT MACULAR EDEMA, WITH LONG-TERM CURRENT USE OF INSULIN: ICD-10-CM

## 2017-10-19 DIAGNOSIS — Z96.1 PSEUDOPHAKIA, BOTH EYES: ICD-10-CM

## 2017-10-19 DIAGNOSIS — Z98.49 POSTOPERATIVE CARE FOR CATARACT: ICD-10-CM

## 2017-10-19 DIAGNOSIS — H40.1112 PRIMARY OPEN ANGLE GLAUCOMA OF RIGHT EYE, MODERATE STAGE: Primary | ICD-10-CM

## 2017-10-19 PROCEDURE — 99999 PR PBB SHADOW E&M-EST. PATIENT-LVL II: CPT | Mod: PBBFAC,,, | Performed by: OPHTHALMOLOGY

## 2017-10-19 PROCEDURE — 99024 POSTOP FOLLOW-UP VISIT: CPT | Mod: ,,, | Performed by: OPHTHALMOLOGY

## 2017-10-19 PROCEDURE — 99212 OFFICE O/P EST SF 10 MIN: CPT | Mod: PBBFAC | Performed by: OPHTHALMOLOGY

## 2017-10-19 RX ORDER — PREDNISOLONE ACETATE 10 MG/ML
1 SUSPENSION/ DROPS OPHTHALMIC 2 TIMES DAILY
Qty: 1 BOTTLE | Refills: 1 | Status: SHIPPED | OUTPATIENT
Start: 2017-10-19 | End: 2018-04-19 | Stop reason: ALTCHOICE

## 2017-10-19 NOTE — PROGRESS NOTES
HPI     Post-op Evaluation    Additional comments: Pt states no complaints today           Comments   S/p Phaco IOL OD 9/20/17    MEDS:   PA QID OD  Timolol GFS QAM OU  Latanoprost QHS OS       Last edited by Vandana Caballero MA on 10/19/2017 11:34 AM. (History)            Assessment /Plan     For exam results, see Encounter Report.    Primary open angle glaucoma of right eye, moderate stage    Postoperative care for cataract  -     prednisoLONE acetate (PRED FORTE) 1 % DrpS; Place 1 drop into the right eye 2 (two) times daily. Please Disregard the previous order for pred mild  Dispense: 1 Bottle; Refill: 1    Primary open angle glaucoma of left eye, severe stage    Controlled type 2 diabetes mellitus with left eye affected by proliferative retinopathy without macular edema, with long-term current use of insulin    Pseudophakia, both eyes          - Here for post - op phaco/IOL od - 2nd eye - again set for near     -  Post-OP PHACO/IOL OS - 5/18/2016 - set for near vision -   WANTS TO BE SET FOR NEAR - IS A MYOPE OU AND LIKES TO REMOVE GLASSES TO DO UP CLOSE WORK ON SMALL [employer]mittorGETS   AIM FOR -2.25 TO -2.50     1. POAG od moderate stage // POAG severe stage OS  Followed as a borderline glaucoma with ocular hypertension 2005 yo 2014     First HVF   2008   First photos   2011   Treatment / Drops started   7/2014              Dx made 4/2014             Family history    + sister /  + mom also a supect - Rx with gtts for a while then taken off        Glaucoma meds    none        H/O adverse rxn to glaucoma drops    none        LASERS    none        GLAUCOMA SURGERIES    none        OTHER EYE SURGERIES    Phaco/IOL OD 9/20/2017 Phaco/IOL OS 5/18/2016 - set for near OU        CDR    0.8/0.9        Tbase    17-26 / 17-26        Tmax    26/26          Ttarget    ?             HVF    4 test 2008 to  2015 - full /nonspecific defects  od // marked gen dep os        Gonio    +3 ou (no NVI or NVA os - s/p avastin for PDR)     "    CCT    544/539        OCT    2 test 2013 to 2014 - RNFL - OD:dec. T/N/I // OS:dec. T/I, bord N        HRT    2 test ([place])  2008 to 2011 - MR - nl od // nl os                    3 test ([facility]) 2014 - 2016 - MR -  nl od // bord I os /// CDR 0.45 od // 0.56 os        Disc photos    2011 - OIS    - Ttoday   20/16  - Test done today    Post-op phaco/IOL od - 2nd eye - set for near again      3. Myopia, astigmatism, presbyopia  Glasses  Use to use [facility] [place] [facility] [place]     4. Diabetes mellitus, type 2   + PDR os - s/p avastin #1 10/19/2014 - [place]  S/P PRP OS   + BDR - od  Sees Kerri     5. VH OS- 2/2 DR - resolved  Saw [place] - 9/3/2015   6. PC IOL os - 5/18/2016 -   -PCB00 18.5 - aimed for near vision per pts request (-2.50) // complex trypan blue   PC IOL od - 9/20/2017 - 18.5 - set for near / complex trypan blue        POAG - OS severe stage  POAG- OD moderate stage  + HVF loss os > OD  + OCT - RNFL changes ou  T base 17-26 ou  + fm hx - sister and ?mom  Cont  - Latanoprost - use OU again   IOP much better with addition of timolol 21/23 --> 14/13    PDR os and BDR od   S/P avastin os 10/19/2014   S/P PRP os   Seeing shaniapatel    -pt likes to remove his glasses and do his near vision work with out glasses   Was and  and still does a lot of small electronic repairs   OD  Pre - op is a  -2.50 sp eq  OS pre-op is a -3.25 sp eq   OS was set for  near vision at about a -2.25 to -2.50 - so it is "balanced with the right eye"     S/p CE - os  - 5/18/2016 - set for near  Doing well    Phaco / IOL OD Date: 9/20/2017 - Aim for -2.5D per patient request  ( PCB00 18.5 )   POM # 1 - phaco/IOL /Trypan   Doing well  Pred Acetate QID - 0- decrease to tid x 1 week then bid till next visit       Cont glaucoma meds  Timolol gfs ou q am   Latanoprost qhs os ONLY - HOLD oOD   OCT mac 9/8/17 - no macular edema - h/o PDR and ME    SAW KERRI RECENTLY 7/2017 - RETINA STABLE WITHOUT MAC EDEMA " OU    F/u 3 - 4 weeks, Post op AR/MR - iritis check     I have seen and personally examined the patient.  I agree with the findings, assessment and plan of the resident and/or fellow.

## 2017-11-16 ENCOUNTER — OFFICE VISIT (OUTPATIENT)
Dept: OPHTHALMOLOGY | Facility: CLINIC | Age: 67
End: 2017-11-16
Payer: MEDICARE

## 2017-11-16 DIAGNOSIS — Z98.49 POSTOPERATIVE CARE FOR CATARACT: Primary | ICD-10-CM

## 2017-11-16 DIAGNOSIS — H40.1112 PRIMARY OPEN ANGLE GLAUCOMA OF RIGHT EYE, MODERATE STAGE: ICD-10-CM

## 2017-11-16 DIAGNOSIS — E11.3592 CONTROLLED TYPE 2 DIABETES MELLITUS WITH LEFT EYE AFFECTED BY PROLIFERATIVE RETINOPATHY WITHOUT MACULAR EDEMA, WITH LONG-TERM CURRENT USE OF INSULIN: ICD-10-CM

## 2017-11-16 DIAGNOSIS — H40.1123 PRIMARY OPEN ANGLE GLAUCOMA OF LEFT EYE, SEVERE STAGE: ICD-10-CM

## 2017-11-16 DIAGNOSIS — Z79.4 CONTROLLED TYPE 2 DIABETES MELLITUS WITH LEFT EYE AFFECTED BY PROLIFERATIVE RETINOPATHY WITHOUT MACULAR EDEMA, WITH LONG-TERM CURRENT USE OF INSULIN: ICD-10-CM

## 2017-11-16 DIAGNOSIS — Z48.810 POSTOPERATIVE CARE FOR CATARACT: Primary | ICD-10-CM

## 2017-11-16 DIAGNOSIS — Z96.1 PSEUDOPHAKIA, BOTH EYES: ICD-10-CM

## 2017-11-16 PROCEDURE — 99999 PR PBB SHADOW E&M-EST. PATIENT-LVL II: CPT | Mod: PBBFAC,,, | Performed by: OPHTHALMOLOGY

## 2017-11-16 PROCEDURE — 99024 POSTOP FOLLOW-UP VISIT: CPT | Mod: ,,, | Performed by: OPHTHALMOLOGY

## 2017-11-16 PROCEDURE — 99212 OFFICE O/P EST SF 10 MIN: CPT | Mod: PBBFAC | Performed by: OPHTHALMOLOGY

## 2017-11-16 NOTE — PROGRESS NOTES
HPI     Post-op Evaluation    Additional comments: Pt states no complaints today           Comments   S/p Phaco IOL OD 9/20/17    MEDS:   PA BID OD  Timolol GFS QAM OU  Latanoprost QHS OS       Last edited by Mandie Mueller MD on 11/16/2017 12:51 PM. (History)            Assessment /Plan     For exam results, see Encounter Report.    Postoperative care for cataract    Primary open angle glaucoma of right eye, moderate stage    Primary open angle glaucoma of left eye, severe stage    Controlled type 2 diabetes mellitus with left eye affected by proliferative retinopathy without macular edema, with long-term current use of insulin    Pseudophakia, both eyes          - Here for post - op phaco/IOL od - 2nd eye - again set for near     -  Post-OP PHACO/IOL OS - 5/18/2016 - set for near vision -   WANTS TO BE SET FOR NEAR - IS A MYOPE OU AND LIKES TO REMOVE GLASSES TO DO UP CLOSE WORK ON SMALL ELECTRONIC GADGETS   AIM FOR -2.25 TO -2.50     1. POAG od moderate stage // POAG severe stage OS  Followed as a borderline glaucoma with ocular hypertension 2005 yo 2014     First HVF   2008   First photos   2011   Treatment / Drops started   7/2014              Dx made 4/2014             Family history    + sister /  + mom also a supect - Rx with gtts for a while then taken off        Glaucoma meds    none        H/O adverse rxn to glaucoma drops    none        LASERS    none        GLAUCOMA SURGERIES    none        OTHER EYE SURGERIES    Phaco/IOL OD 9/20/2017 Phaco/IOL OS 5/18/2016 - set for near OU        CDR    0.8/0.9        Tbase    17-26 / 17-26        Tmax    26/26          Ttarget    ?             HVF    4 test 2008 to  2015 - full /nonspecific defects  od // marked gen dep os        Gonio    +3 ou (no NVI or NVA os - s/p avastin for PDR)        CCT    544/539        OCT    2 test 2013 to 2014 - RNFL - OD:dec. T/N/I // OS:dec. T/Iayanna        HRT    2 test ([place])  2008 to 2011 - MR - nl od // nl os            "         3 test ([facility]) 2014 - 2016 - MR -  nl od // bord I os /// CDR 0.45 od // 0.56 os        Disc photos    2011 - OIS    - Ttoday   20/16  - Test done today    Post-op phaco/IOL od - 2nd eye - set for near again      3. Myopia, astigmatism, presbyopia  Glasses  Use to use [facility] [place] [facility] [place]     4. Diabetes mellitus, type 2   + PDR os - s/p avastin #1 10/19/2014 - [place]  S/P PRP OS   + BDR - od  Sees Kerri     5. VH OS- 2/2 DR - resolved  Saw [place] - 9/3/2015   6. PC IOL os - 5/18/2016 -   -PCB00 18.5 - aimed for near vision per pts request (-2.50) // complex trypan blue   PC IOL od - 9/20/2017 - 18.5 - set for near / complex trypan blue        POAG - OS severe stage  POAG- OD moderate stage  + HVF loss os > OD  + OCT - RNFL changes ou  T base 17-26 ou  + fm hx - sister and ?mom  Cont  - Latanoprost - use OU again   IOP much better with addition of timolol 21/23 --> 14/13    PDR os and BDR od   S/P avastin os 10/19/2014   S/P PRP os   Seeing kerri    -pt likes to remove his glasses and do his near vision work with out glasses   Was and  and still does a lot of small electronic repairs   OD  Pre - op is a  -2.50 sp eq  OS pre-op is a -3.25 sp eq   OS was set for  near vision at about a -2.25 to -2.50 - so it is "balanced with the right eye"     S/p CE - os  - 5/18/2016 - set for near  Doing well    Phaco / IOL OD Date: 9/20/2017 - Aim for -2.5D per patient request  ( PCB00 18.5 )   POM # 2 - phaco/IOL /Trypan   Doing well  Pred Acetate QID - 0- decrease to bid x 2 weeks then q day     Cont glaucoma meds  Timolol gfs ou q am   Latanoprost qhs os ONLY - HOLD oOD   OCT mac 9/8/17 - no macular edema - h/o PDR and ME    SAW KERRI RECENTLY 7/2017 - RETINA STABLE WITHOUT MAC EDEMA OU    F/u 3 - 4 weeks, Post op AR/MR - iritis check     I have seen and personally examined the patient.  I agree with the findings, assessment and plan of the resident and/or fellow.     "

## 2017-12-14 ENCOUNTER — OFFICE VISIT (OUTPATIENT)
Dept: OPHTHALMOLOGY | Facility: CLINIC | Age: 67
End: 2017-12-14
Payer: MEDICARE

## 2017-12-14 DIAGNOSIS — Z48.810 POSTOPERATIVE CARE FOR CATARACT: Primary | ICD-10-CM

## 2017-12-14 DIAGNOSIS — Z96.1 PSEUDOPHAKIA, BOTH EYES: ICD-10-CM

## 2017-12-14 DIAGNOSIS — H40.1112 PRIMARY OPEN ANGLE GLAUCOMA OF RIGHT EYE, MODERATE STAGE: ICD-10-CM

## 2017-12-14 DIAGNOSIS — H40.1123 PRIMARY OPEN ANGLE GLAUCOMA OF LEFT EYE, SEVERE STAGE: ICD-10-CM

## 2017-12-14 DIAGNOSIS — Z79.4 CONTROLLED TYPE 2 DIABETES MELLITUS WITH LEFT EYE AFFECTED BY PROLIFERATIVE RETINOPATHY WITHOUT MACULAR EDEMA, WITH LONG-TERM CURRENT USE OF INSULIN: ICD-10-CM

## 2017-12-14 DIAGNOSIS — Z98.49 POSTOPERATIVE CARE FOR CATARACT: Primary | ICD-10-CM

## 2017-12-14 DIAGNOSIS — E11.3592 CONTROLLED TYPE 2 DIABETES MELLITUS WITH LEFT EYE AFFECTED BY PROLIFERATIVE RETINOPATHY WITHOUT MACULAR EDEMA, WITH LONG-TERM CURRENT USE OF INSULIN: ICD-10-CM

## 2017-12-14 PROCEDURE — 99999 PR PBB SHADOW E&M-EST. PATIENT-LVL II: CPT | Mod: PBBFAC,,, | Performed by: OPHTHALMOLOGY

## 2017-12-14 PROCEDURE — 99212 OFFICE O/P EST SF 10 MIN: CPT | Mod: PBBFAC | Performed by: OPHTHALMOLOGY

## 2017-12-14 PROCEDURE — 99024 POSTOP FOLLOW-UP VISIT: CPT | Mod: ,,, | Performed by: OPHTHALMOLOGY

## 2017-12-14 RX ORDER — TIMOLOL MALEATE 5 MG/ML
1 SOLUTION OPHTHALMIC DAILY
COMMUNITY
Start: 2017-11-01 | End: 2018-08-21 | Stop reason: SDUPTHER

## 2017-12-14 NOTE — PROGRESS NOTES
HPI     Post-op Evaluation    Additional comments: Pt states            Comments   S/p Phaco IOL OD 9/20/17    MEDS:   PA BID OD  Timolol GFS QAM OU  Latanoprost QHS OS       Last edited by Vandana Caballero MA on 12/14/2017 10:44 AM. (History)            Assessment /Plan     For exam results, see Encounter Report.    Postoperative care for cataract    Primary open angle glaucoma of right eye, moderate stage  -     Color Fundus Photography - OU - Both Eyes; Future  -     Campos Visual Field - OU - Extended - Both Eyes; Future    Primary open angle glaucoma of left eye, severe stage  -     Color Fundus Photography - OU - Both Eyes; Future  -     Campos Visual Field - OU - Extended - Both Eyes; Future    Controlled type 2 diabetes mellitus with left eye affected by proliferative retinopathy without macular edema, with long-term current use of insulin    Pseudophakia, both eyes          - Here for post - op phaco/IOL od - 2nd eye - again set for near     -  Post-OP PHACO/IOL OS - 5/18/2016 - set for near vision -   WANTS TO BE SET FOR NEAR - IS A MYOPE OU AND LIKES TO REMOVE GLASSES TO DO UP CLOSE WORK ON SMALL [employer] GADGETS   AIM FOR -2.25 TO -2.50     1. POAG od moderate stage // POAG severe stage OS  Followed as a borderline glaucoma with ocular hypertension [age] yo 2014     First HVF   2008   First photos   2011   Treatment / Drops started   7/2014              Dx made 4/2014             Family history    + sister /  + mom also a supect - Rx with gtts for a while then taken off        Glaucoma meds    none        H/O adverse rxn to glaucoma drops    none        LASERS    none        GLAUCOMA SURGERIES    none        OTHER EYE SURGERIES    Phaco/IOL OD 9/20/2017 Phaco/IOL OS 5/18/2016 - set for near OU        CDR    0.8/0.9        Tbase    17-26 / 17-26        Tmax    26/26          Ttarget    ?             HVF    4 test 2008 to  2015 - full /nonspecific defects  od // marked gen dep os        Gonio    +3 ou (no  "NVI or NVA os - s/p avastin for PDR)        CCT    544/539        OCT    2 test 2013 to 2014 - RNFL - OD:dec. T/N/I // OS:dec. T/I, ayanna N        HRT    2 test ([place])  2008 to 2011 - MR - nl od // nl os                    3 test ([facility]) 2014 - 2016 - MR -  nl od // bord I os /// CDR 0.45 od // 0.56 os        Disc photos    2011 - OIS    - Ttoday   20/16  - Test done today    Post-op phaco/IOL od - 2nd eye - set for near again      3. Myopia, astigmatism, presbyopia  Glasses  Use to use [facility] [place] [facility] [place]     4. Diabetes mellitus, type 2   + PDR os - s/p avastin #1 10/19/2014 - [place]  S/P PRP OS   + BDR - od  Sees Kerri     5. VH OS- 2/2 DR - resolved  Saw Kerri - 9/3/2015   6. PC IOL os - 5/18/2016 -   -PCB00 18.5 - aimed for near vision per pts request (-2.50) // complex trypan blue   PC IOL od - 9/20/2017 - 18.5 - set for near / complex trypan blue        POAG - OS severe stage  POAG- OD moderate stage  + HVF loss os > OD  + OCT - RNFL changes ou  T base 17-26 ou  + fm hx - sister and ?mom  Cont  - Latanoprost - use OU again   IOP much better with addition of timolol 21/23 --> 14/13    PDR os and BDR od   S/P avastin os 10/19/2014   S/P PRP os   Seeing kerri    -pt likes to remove his glasses and do his near vision work with out glasses   Was and  and still does a lot of small electronic repairs   OD  Pre - op is a  -2.50 sp eq  OS pre-op is a -3.25 sp eq   OS was set for  near vision at about a -2.25 to -2.50 - so it is "balanced with the right eye"     S/p CE - os  - 5/18/2016 - set for near  Doing well    Phaco / IOL OD Date: 9/20/2017 - Aim for -2.5D per patient request  ( PCB00 18.5 )   POM # 3 - phaco/IOL /Trypan   Doing well  Pred Acetate - decrease to 1 x day until it runs out     Cont glaucoma meds  Timolol gfs ou q am   Latanoprost qhs os ONLY - HOLD oOD   OCT mac 9/8/17 - no macular edema - h/o PDR and ME    SAW KERIR RECENTLY 7/2017 - RETINA STABLE WITHOUT " MAC EDEMA OU    No need to change glasses post CE OD - same as pre-op refraction     F/u 3 - 4 months with HVF / DFE / disc photos - glaucoma follow up     I have seen and personally examined the patient.  I agree with the findings, assessment and plan of the resident and/or fellow.

## 2017-12-15 RX ORDER — INSULIN GLARGINE 100 [IU]/ML
INJECTION, SOLUTION SUBCUTANEOUS
Qty: 90 ML | Refills: 11 | Status: SHIPPED | OUTPATIENT
Start: 2017-12-15 | End: 2018-12-09 | Stop reason: SDUPTHER

## 2017-12-26 DIAGNOSIS — F32.A DEPRESSION, UNSPECIFIED DEPRESSION TYPE: ICD-10-CM

## 2017-12-27 RX ORDER — LOSARTAN POTASSIUM 25 MG/1
TABLET ORAL
Qty: 90 TABLET | Refills: 0 | Status: SHIPPED | OUTPATIENT
Start: 2017-12-27 | End: 2018-07-30 | Stop reason: SDUPTHER

## 2017-12-27 RX ORDER — PAROXETINE HYDROCHLORIDE 20 MG/1
TABLET, FILM COATED ORAL
Qty: 90 TABLET | Refills: 0 | Status: SHIPPED | OUTPATIENT
Start: 2017-12-27 | End: 2019-02-01 | Stop reason: ALTCHOICE

## 2018-03-09 RX ORDER — EZETIMIBE 10 MG/1
TABLET ORAL
Qty: 90 TABLET | Refills: 0 | Status: SHIPPED | OUTPATIENT
Start: 2018-03-09 | End: 2018-06-08 | Stop reason: SDUPTHER

## 2018-03-12 ENCOUNTER — TELEPHONE (OUTPATIENT)
Dept: INTERNAL MEDICINE | Facility: CLINIC | Age: 68
End: 2018-03-12

## 2018-03-12 NOTE — TELEPHONE ENCOUNTER
----- Message from Nabila Encinas sent at 3/12/2018  1:37 PM CDT -----  Contact: Clary/  Network [phone]  Calling on a Follow Up of Documents that were sent. Chart ID #[id number].    Thank You

## 2018-03-22 ENCOUNTER — OFFICE VISIT (OUTPATIENT)
Dept: INTERNAL MEDICINE | Facility: CLINIC | Age: 68
End: 2018-03-22
Attending: FAMILY MEDICINE
Payer: MEDICARE

## 2018-03-22 ENCOUNTER — TELEPHONE (OUTPATIENT)
Dept: INTERNAL MEDICINE | Facility: CLINIC | Age: 68
End: 2018-03-22

## 2018-03-22 VITALS
SYSTOLIC BLOOD PRESSURE: 110 MMHG | TEMPERATURE: 98 F | HEART RATE: 73 BPM | HEIGHT: 73 IN | WEIGHT: 214 LBS | OXYGEN SATURATION: 98 % | BODY MASS INDEX: 28.36 KG/M2 | DIASTOLIC BLOOD PRESSURE: 62 MMHG

## 2018-03-22 DIAGNOSIS — Z12.5 PROSTATE CANCER SCREENING: ICD-10-CM

## 2018-03-22 DIAGNOSIS — K63.5 POLYP OF COLON, UNSPECIFIED PART OF COLON, UNSPECIFIED TYPE: ICD-10-CM

## 2018-03-22 DIAGNOSIS — Z79.4 TYPE 2 DIABETES MELLITUS WITH DIABETIC POLYNEUROPATHY, WITH LONG-TERM CURRENT USE OF INSULIN: Chronic | ICD-10-CM

## 2018-03-22 DIAGNOSIS — E11.42 TYPE 2 DIABETES MELLITUS WITH DIABETIC POLYNEUROPATHY, WITH LONG-TERM CURRENT USE OF INSULIN: Chronic | ICD-10-CM

## 2018-03-22 DIAGNOSIS — N18.30 CKD (CHRONIC KIDNEY DISEASE) STAGE 3, GFR 30-59 ML/MIN: ICD-10-CM

## 2018-03-22 DIAGNOSIS — E78.5 HYPERLIPIDEMIA, UNSPECIFIED HYPERLIPIDEMIA TYPE: ICD-10-CM

## 2018-03-22 DIAGNOSIS — E11.22 TYPE 2 DIABETES MELLITUS WITH DIABETIC CHRONIC KIDNEY DISEASE, UNSPECIFIED CKD STAGE, UNSPECIFIED LONG TERM INSULIN USE STATUS: Primary | Chronic | ICD-10-CM

## 2018-03-22 DIAGNOSIS — I10 HYPERTENSION, ESSENTIAL: ICD-10-CM

## 2018-03-22 DIAGNOSIS — R20.0 NUMBNESS AND TINGLING OF BOTH LEGS: ICD-10-CM

## 2018-03-22 DIAGNOSIS — R20.2 NUMBNESS AND TINGLING OF BOTH LEGS: ICD-10-CM

## 2018-03-22 PROBLEM — H25.11 NUCLEAR SCLEROTIC CATARACT OF RIGHT EYE: Status: RESOLVED | Noted: 2017-09-20 | Resolved: 2018-03-22

## 2018-03-22 LAB
CREAT UR-MCNC: 143 MG/DL
MICROALBUMIN UR DL<=1MG/L-MCNC: 7 UG/ML
MICROALBUMIN/CREATININE RATIO: 4.9 UG/MG

## 2018-03-22 PROCEDURE — 99215 OFFICE O/P EST HI 40 MIN: CPT | Mod: PBBFAC | Performed by: FAMILY MEDICINE

## 2018-03-22 PROCEDURE — 99999 PR PBB SHADOW E&M-EST. PATIENT-LVL V: CPT | Mod: PBBFAC,,, | Performed by: FAMILY MEDICINE

## 2018-03-22 PROCEDURE — 99214 OFFICE O/P EST MOD 30 MIN: CPT | Mod: S$PBB,,, | Performed by: FAMILY MEDICINE

## 2018-03-22 PROCEDURE — 82043 UR ALBUMIN QUANTITATIVE: CPT

## 2018-03-22 NOTE — PROGRESS NOTES
Subjective:       Patient ID: Jamarcus Mcqueen is a 67 y.o. male.    Chief Complaint: Follow-up and Numbness    HPI  Review of Systems   Constitutional: Negative for chills, fatigue and fever.   HENT: Negative for congestion and trouble swallowing.    Eyes: Negative for redness.   Respiratory: Negative for cough, chest tightness and shortness of breath.    Cardiovascular: Negative for chest pain, palpitations and leg swelling.   Gastrointestinal: Negative for abdominal pain and blood in stool.   Genitourinary: Negative for hematuria.   Musculoskeletal: Positive for myalgias. Negative for arthralgias, back pain, gait problem, joint swelling and neck pain.   Skin: Negative for color change and rash.   Neurological: Positive for numbness. Negative for tremors, speech difficulty, weakness and headaches.   Hematological: Negative for adenopathy. Does not bruise/bleed easily.   Psychiatric/Behavioral: Negative for behavioral problems, confusion and sleep disturbance. The patient is not nervous/anxious.        Objective:      Physical Exam   Constitutional: He is oriented to person, place, and time. He appears well-developed and well-nourished.   Eyes: No scleral icterus.   Neck: Normal range of motion. Neck supple. No JVD present. Carotid bruit is not present. No tracheal deviation present. No thyromegaly present.   Cardiovascular: Normal rate, regular rhythm, normal heart sounds and intact distal pulses.  Exam reveals no gallop and no friction rub.    No murmur heard.  Pulses:       Dorsalis pedis pulses are 2+ on the right side, and 2+ on the left side.   Pulmonary/Chest: Effort normal and breath sounds normal. No respiratory distress. He has no wheezes. He has no rales.   Abdominal: Soft. Bowel sounds are normal. He exhibits no distension and no mass. There is no tenderness. There is no rebound and no guarding.   Musculoskeletal: He exhibits no edema.   Feet:   Right Foot:   Protective Sensation: 3 sites tested. 3 sites  sensed.   Skin Integrity: Negative for skin breakdown.   Left Foot:   Protective Sensation: 3 sites tested. 3 sites sensed.   Skin Integrity: Negative for skin breakdown.   Lymphadenopathy:     He has no cervical adenopathy.   Neurological: He is alert and oriented to person, place, and time. He displays no tremor. No cranial nerve deficit. Coordination and gait normal.   Skin: Skin is warm and dry. No rash noted. He is not diaphoretic. No erythema.   Psychiatric: He has a normal mood and affect. His behavior is normal. Judgment and thought content normal.   Nursing note and vitals reviewed.      Assessment:       1. Type 2 diabetes mellitus with diabetic chronic kidney disease, unspecified CKD stage, unspecified long term insulin use status    2. Type 2 diabetes mellitus with diabetic polyneuropathy, with long-term current use of insulin    3. Hypertension, essential    4. Hyperlipidemia, unspecified hyperlipidemia type    5. CKD (chronic kidney disease) stage 3, GFR 30-59 ml/min    6. Prostate cancer screening    7. Polyp of colon, unspecified part of colon, unspecified type    8. Numbness and tingling of both legs        Plan:   Jamarcus was seen today for follow-up and numbness.    Diagnoses and all orders for this visit:    Type 2 diabetes mellitus with diabetic chronic kidney disease, unspecified CKD stage, unspecified long term insulin use status  -     Comprehensive metabolic panel; Future  -     Hemoglobin A1c; Future  -     Microalbumin/creatinine urine ratio  -     Ambulatory referral to Optometry  -     Ambulatory consult to Podiatry    Type 2 diabetes mellitus with diabetic polyneuropathy, with long-term current use of insulin    Hypertension, essential    Hyperlipidemia, unspecified hyperlipidemia type  -     Lipid panel; Future    CKD (chronic kidney disease) stage 3, GFR 30-59 ml/min    Prostate cancer screening  -     PSA, Screening; Future    Polyp of colon, unspecified part of colon, unspecified  type    Numbness and tingling of both legs  -     Ambulatory referral to Neurology      See meds, orders, follow up, routing and instructions sections of encounter.  A 67-year-old patient.  He is in for an examination.  He has no acute   complaints.  He has a family history of breast cancer.  His laboratory is due.    Discussed diabetic health maintenance and colonoscopy.      CIARA/HN  dd: 03/22/2018 14:23:04 (CDT)  td: 03/23/2018 12:01:26 (CDT)  Doc ID   #[id number]  Job ID #[id number]    CC:

## 2018-04-05 ENCOUNTER — OFFICE VISIT (OUTPATIENT)
Dept: PODIATRY | Facility: CLINIC | Age: 68
End: 2018-04-05
Attending: FAMILY MEDICINE
Payer: MEDICARE

## 2018-04-05 VITALS
SYSTOLIC BLOOD PRESSURE: 115 MMHG | HEART RATE: 72 BPM | DIASTOLIC BLOOD PRESSURE: 69 MMHG | WEIGHT: 219 LBS | BODY MASS INDEX: 29.03 KG/M2 | HEIGHT: 73 IN

## 2018-04-05 DIAGNOSIS — E11.9 CONTROLLED TYPE 2 DIABETES MELLITUS WITHOUT COMPLICATION, UNSPECIFIED LONG TERM INSULIN USE STATUS: Primary | ICD-10-CM

## 2018-04-05 DIAGNOSIS — L85.3 XEROSIS CUTIS: ICD-10-CM

## 2018-04-05 DIAGNOSIS — B35.1 ONYCHOMYCOSIS DUE TO DERMATOPHYTE: ICD-10-CM

## 2018-04-05 PROCEDURE — 99999 PR PBB SHADOW E&M-EST. PATIENT-LVL III: CPT | Mod: PBBFAC,,, | Performed by: PODIATRIST

## 2018-04-05 PROCEDURE — 99213 OFFICE O/P EST LOW 20 MIN: CPT | Mod: PBBFAC | Performed by: PODIATRIST

## 2018-04-05 PROCEDURE — 99213 OFFICE O/P EST LOW 20 MIN: CPT | Mod: S$PBB,,, | Performed by: PODIATRIST

## 2018-04-05 NOTE — PROGRESS NOTES
Subjective:      Patient ID: Jamarcus Mcqueen is a 67 y.o. male.    Chief Complaint: Diabetes Mellitus (dr. daigle 03/22/2018); Diabetic Foot Exam; and Nail Care    Jamarcus is a 67 y.o. male who presents to the clinic upon referral from Dr. Daigle  for evaluation and treatment of diabetic feet. Jamarcsu has a past medical history of Allergy; Diabetes mellitus type II; Diabetic retinopathy; DM type 2 causing CKD stage 3; Fever blister; Glaucoma; Hypertension; and Obesity (BMI 30-39.9) (6/4/2015). Patient relates no major problem with feet. Only complaints today consist of dm foot exam, dry flaking skin to feet and thick discolored toenails . Has used Jublia in the past but states it is no longer covered by insurance. Inquiring about alternative options.     PCP: Kt Daigle MD    Date Last Seen by PCP:   Chief Complaint   Patient presents with    Diabetes Mellitus     dr. daigle 03/22/2018    Diabetic Foot Exam    Nail Care         Current shoe gear: Casual shoes    Hemoglobin A1C   Date Value Ref Range Status   03/22/2018 6.5 (H) 4.0 - 5.6 % Final     Comment:     According to ADA guidelines, hemoglobin A1c <7.0% represents  optimal control in non-pregnant diabetic patients. Different  metrics may apply to specific patient populations.   Standards of Medical Care in Diabetes-2016.  For the purpose of screening for the presence of diabetes:  <5.7%     Consistent with the absence of diabetes  5.7-6.4%  Consistent with increasing risk for diabetes   (prediabetes)  >or=6.5%  Consistent with diabetes  Currently, no consensus exists for use of hemoglobin A1c  for diagnosis of diabetes for children.  This Hemoglobin A1c assay has significant interference with fetal   hemoglobin   (HbF). The results are invalid for patients with abnormal amounts of   HbF,   including those with known Hereditary Persistence   of Fetal Hemoglobin. Heterozygous hemoglobin variants (HbAS, HbAC,   HbAD, HbAE, HbA2) do not significantly  interfere with this assay;   however, presence of multiple variants in a sample may impact the %   interference.     08/03/2017 6.4 (H) 4.0 - 5.6 % Final     Comment:     According to ADA guidelines, hemoglobin A1c <7.0% represents  optimal control in non-pregnant diabetic patients. Different  metrics may apply to specific patient populations.   Standards of Medical Care in Diabetes-2016.  For the purpose of screening for the presence of diabetes:  <5.7%     Consistent with the absence of diabetes  5.7-6.4%  Consistent with increasing risk for diabetes   (prediabetes)  >or=6.5%  Consistent with diabetes  Currently, no consensus exists for use of hemoglobin A1c  for diagnosis of diabetes for children.  This Hemoglobin A1c assay has significant interference with fetal   hemoglobin   (HbF). The results are invalid for patients with abnormal amounts of   HbF,   including those with known Hereditary Persistence   of Fetal Hemoglobin. Heterozygous hemoglobin variants (HbAS, HbAC,   HbAD, HbAE, HbA2) do not significantly interfere with this assay;   however, presence of multiple variants in a sample may impact the %   interference.     12/01/2016 7.1 (H) 4.5 - 6.2 % Final     Comment:     According to ADA guidelines, hemoglobin A1C <7.0% represents  optimal control in non-pregnant diabetic patients.  Different  metrics may apply to specific populations.   Standards of Medical Care in Diabetes - 2016.  For the purpose of screening for the presence of diabetes:  <5.7%     Consistent with the absence of diabetes  5.7-6.4%  Consistent with increasing risk for diabetes   (prediabetes)  >or=6.5%  Consistent with diabetes  Currently no consensus exists for use of hemoglobin A1C  for diagnosis of diabetes for children.           Patient Active Problem List   Diagnosis    CKD (chronic kidney disease) stage 3, GFR 30-59 ml/min    Nuclear sclerosis - Both Eyes    Myopia with astigmatism and presbyopia - Both Eyes    Primary open  angle glaucoma, left eye    Primary open angle glaucoma, right eye    Vitreous hemorrhage, left eye    Overweight (BMI 25.0-29.9)    Hypertension, essential    Hyperlipidemia    Depression    Colon polyps    Type 2 diabetes mellitus with diabetic chronic kidney disease    Type 2 diabetes mellitus with diabetic polyneuropathy    Senile nuclear sclerosis    Moderate nonproliferative diabetic retinopathy of both eyes without macular edema associated with type 2 diabetes mellitus    Proliferative diabetic retinopathy of left eye without macular edema associated with type 2 diabetes mellitus    Controlled type 2 diabetes mellitus with left eye affected by proliferative retinopathy without macular edema, with long-term current use of insulin    Controlled type 2 diabetes mellitus with right eye affected by moderate nonproliferative retinopathy without macular edema, with long-term current use of insulin       Current Outpatient Prescriptions on File Prior to Visit   Medication Sig Dispense Refill    efinaconazole (JUBLIA) 10 % Armen Apply 1 application topically once daily. 8 mL 5    ezetimibe (ZETIA) 10 mg tablet TAKE 1 TABLET DAILY 90 tablet 0    insulin lispro (HUMALOG) 100 unit/mL injection To use with correction scale only, max total daily dose 30 units daily (Patient taking differently: To use with correction scale only, max total daily dose 30 units daily) 1 mL 11    LANTUS SOLOSTAR 100 unit/mL (3 mL) InPn pen INJECT 30 UNITS UNDER THE SKIN EVERY EVENING 90 mL 11    latanoprost 0.005 % ophthalmic solution Place 1 drop into both eyes every evening. 90 day supply = 3 bottles (Patient taking differently: Place 1 drop into the left eye every evening. 90 day supply = 3 bottles) 3 Bottle 3    LEVITRA 20 mg tablet TAKE 1 TABLET BY MOUTH DAILY AS NEEDED FOR ERECTILE DYSFUNCTION 24 tablet 0    losartan (COZAAR) 25 MG tablet TAKE 1 TABLET DAILY 90 tablet 0    paroxetine (PAXIL) 20 MG tablet TAKE 1 TABLET  EVERY MORNING 90 tablet 0    prednisoLONE acetate (PRED FORTE) 1 % DrpS Place 1 drop into the right eye 2 (two) times daily. Please Disregard the previous order for pred mild 1 Bottle 1    simvastatin (ZOCOR) 40 MG tablet Take 1 tablet (40 mg total) by mouth every evening. 30 tablet 0    timolol maleate 0.5% (TIMOPTIC-XE) 0.5 % SolG Place 1 drop into both eyes once daily.       No current facility-administered medications on file prior to visit.        Review of patient's allergies indicates:   Allergen Reactions    Lipitor [atorvastatin]      Myalgia    Sulfa (sulfonamide antibiotics) Itching       Past Surgical History:   Procedure Laterality Date    Avastin Injection Left 10/09/2014    Dr. Walter    CATARACT EXTRACTION W/  INTRAOCULAR LENS IMPLANT Left 05/18/2016    OS WITH     CATARACT EXTRACTION W/  INTRAOCULAR LENS IMPLANT Right 09/20/2017        COLONOSCOPY N/A 4/1/2016    Procedure: COLONOSCOPY;  Surgeon: Juan Pablo Pascual MD;  Location: [facility] ([address] [place] [address]);  Service: Endoscopy;  Laterality: N/A;  Do not cancel this order    HEMORRHOID SURGERY      REFRACTIVE SURGERY  2014 and 2015    TONSILLECTOMY         Family History   Problem Relation Age of Onset    Diabetes Mother     Hyperlipidemia Mother     Glaucoma Mother     No Known Problems Father     Diabetes Sister     Diabetes Sister     No Known Problems Brother     No Known Problems Maternal Aunt     No Known Problems Maternal Uncle     No Known Problems Paternal Aunt     No Known Problems Paternal Uncle     No Known Problems Maternal Grandmother     No Known Problems Maternal Grandfather     No Known Problems Paternal Grandmother     No Known Problems Paternal Grandfather     Amblyopia Neg Hx     Blindness Neg Hx     Cataracts Neg Hx     Macular degeneration Neg Hx     Retinal detachment Neg Hx     Strabismus Neg Hx     Melanoma Neg Hx     Cancer Neg Hx     Hypertension Neg Hx     Stroke Neg  "Hx     Thyroid disease Neg Hx        Social History     Social History    Marital status:      Spouse name: N/A    Number of children: N/A    Years of education: N/A     Occupational History          Social History Main Topics    Smoking status: Former Smoker     Quit date: 1/23/1984    Smokeless tobacco: Never Used    Alcohol use No    Drug use: Unknown    Sexual activity: Not on file     Other Topics Concern    Not on file     Social History Narrative    No narrative on file                   Review of Systems   Constitution: Negative for chills, decreased appetite and fever.   Cardiovascular: Negative for leg swelling.   Skin: Positive for dry skin and nail changes.   Musculoskeletal: Negative for arthritis, joint pain, joint swelling and myalgias.   Gastrointestinal: Negative for nausea and vomiting.   Neurological: Negative for loss of balance, numbness and paresthesias.           Objective:       Vitals:    04/05/18 1433   BP: 115/69   Pulse: 72   Weight: 99.3 kg (219 lb)   Height: 6' 1.2" (1.859 m)   PainSc: 0-No pain        Physical Exam   Constitutional: He is oriented to person, place, and time. He appears well-developed and well-nourished.   Cardiovascular: Intact distal pulses.    Pulses:       Dorsalis pedis pulses are 2+ on the right side, and 2+ on the left side.        Posterior tibial pulses are 2+ on the right side, and 2+ on the left side.   dorsalis pedis and posterior tibial pulses are palpable bilaterally. Capillary refill time is within normal limits. - pedal hair growth          Musculoskeletal: Normal range of motion. He exhibits no edema or tenderness.   Adequate joint range of motion without pain, limitation, nor crepitation Bilateral feet and ankle joints. Muscle strength is 5/5 in all groups bilaterally.         Feet:   Right Foot:   Protective Sensation: 10 sites tested. 10 sites sensed.   Left Foot:   Protective Sensation: 10 sites tested. 10 sites " sensed.   Neurological: He is alert and oriented to person, place, and time. He has normal strength. No sensory deficit.   [place]-Catia 5.07 monofilament is intact bilateral feet.      Skin: Skin is warm, dry and intact. No abrasion, no bruising, no ecchymosis, no lesion and no rash noted. No erythema.   Nails x 1-  are elongated by  1-3mm's, thickened by 3-5 mm's, dystrophic, and are darkened in  coloration . Xerosis Bilaterally. No open lesions noted.    Scaling dryness in a moccasin distribution is noted to the bilateral lower extremities with associated erythema.         Psychiatric: He has a normal mood and affect. His behavior is normal.   Vitals reviewed.            Assessment:       Encounter Diagnoses   Name Primary?    Controlled type 2 diabetes mellitus without complication, unspecified long term insulin use status Yes    Onychomycosis due to dermatophyte     Xerosis cutis          Plan:       Jamarcus was seen today for diabetes mellitus, diabetic foot exam and nail care.    Diagnoses and all orders for this visit:    Controlled type 2 diabetes mellitus without complication, unspecified long term insulin use status    Onychomycosis due to dermatophyte    Xerosis cutis      I counseled the patient on his conditions, their implications and medical management.    - Shoe inspection. Diabetic Foot Education. Patient reminded of the importance of good nutrition and blood sugar control to help prevent podiatric complications of diabetes. Patient instructed on proper foot hygeine. We discussed wearing proper shoe gear, daily foot inspections, never walking without protective shoe gear, caution putting sharp instruments to feet     - Discussed DM foot care:  Wear comfortable, proper fitting shoes. Wash feet daily. Dry well. After drying, apply moisturizer to feet (no lotion to webspaces). Inspect feet daily for skin breaks, blisters, swelling, or redness. Wear cotton socks (preferably white)  Change socks  every day. Do NOT walk barefoot. Do NOT use heating pads or warm/hot water soaks     - Discussed importance of daily moisturizer to the feet such as Gold bonds diabetic foot cream    - Patient is low risk for developing lower extremity issues secondary to diabetes    - Discussed  options for nail fungus including topicals such as Jublia and Penlac, Oral Lamisil, Lasers, and topical OTC remedies. He would like to try Vicks vaporub for several months to see if this will help.     - Advised the patient that fungus likes warm, dark, and moist environments such as bathrooms, gyms, and pools. Advised to spray shower, shower mat , and any shoes w/ Lysol periodically    - RTC in  1 year for a diabetic foot exam  or sooner if problems      .

## 2018-04-19 ENCOUNTER — OFFICE VISIT (OUTPATIENT)
Dept: OPHTHALMOLOGY | Facility: CLINIC | Age: 68
End: 2018-04-19
Attending: OPHTHALMOLOGY
Payer: MEDICARE

## 2018-04-19 ENCOUNTER — LAB VISIT (OUTPATIENT)
Dept: LAB | Facility: HOSPITAL | Age: 68
End: 2018-04-19
Attending: PSYCHIATRY & NEUROLOGY
Payer: MEDICARE

## 2018-04-19 ENCOUNTER — OFFICE VISIT (OUTPATIENT)
Dept: NEUROLOGY | Facility: CLINIC | Age: 68
End: 2018-04-19
Payer: MEDICARE

## 2018-04-19 VITALS
WEIGHT: 217.81 LBS | BODY MASS INDEX: 28.87 KG/M2 | HEART RATE: 82 BPM | DIASTOLIC BLOOD PRESSURE: 70 MMHG | SYSTOLIC BLOOD PRESSURE: 123 MMHG | HEIGHT: 73 IN

## 2018-04-19 DIAGNOSIS — G57.10 MERALGIA PARESTHETICA, UNSPECIFIED LATERALITY: ICD-10-CM

## 2018-04-19 DIAGNOSIS — Z79.4 CONTROLLED TYPE 2 DIABETES MELLITUS WITH LEFT EYE AFFECTED BY PROLIFERATIVE RETINOPATHY WITHOUT MACULAR EDEMA, WITH LONG-TERM CURRENT USE OF INSULIN: ICD-10-CM

## 2018-04-19 DIAGNOSIS — H40.1123 PRIMARY OPEN ANGLE GLAUCOMA OF LEFT EYE, SEVERE STAGE: ICD-10-CM

## 2018-04-19 DIAGNOSIS — E11.42 TYPE 2 DIABETES MELLITUS WITH DIABETIC POLYNEUROPATHY, WITH LONG-TERM CURRENT USE OF INSULIN: Chronic | ICD-10-CM

## 2018-04-19 DIAGNOSIS — E78.5 HYPERLIPIDEMIA, UNSPECIFIED HYPERLIPIDEMIA TYPE: ICD-10-CM

## 2018-04-19 DIAGNOSIS — E11.3592 CONTROLLED TYPE 2 DIABETES MELLITUS WITH LEFT EYE AFFECTED BY PROLIFERATIVE RETINOPATHY WITHOUT MACULAR EDEMA, WITH LONG-TERM CURRENT USE OF INSULIN: ICD-10-CM

## 2018-04-19 DIAGNOSIS — E11.42 TYPE 2 DIABETES MELLITUS WITH DIABETIC POLYNEUROPATHY, WITH LONG-TERM CURRENT USE OF INSULIN: Primary | Chronic | ICD-10-CM

## 2018-04-19 DIAGNOSIS — N18.30 CKD (CHRONIC KIDNEY DISEASE) STAGE 3, GFR 30-59 ML/MIN: ICD-10-CM

## 2018-04-19 DIAGNOSIS — G60.3 IDIOPATHIC PROGRESSIVE NEUROPATHY: ICD-10-CM

## 2018-04-19 DIAGNOSIS — Z79.4 TYPE 2 DIABETES MELLITUS WITH DIABETIC POLYNEUROPATHY, WITH LONG-TERM CURRENT USE OF INSULIN: Chronic | ICD-10-CM

## 2018-04-19 DIAGNOSIS — I10 HYPERTENSION, ESSENTIAL: ICD-10-CM

## 2018-04-19 DIAGNOSIS — Z79.4 TYPE 2 DIABETES MELLITUS WITH DIABETIC POLYNEUROPATHY, WITH LONG-TERM CURRENT USE OF INSULIN: Primary | Chronic | ICD-10-CM

## 2018-04-19 DIAGNOSIS — H40.1112 PRIMARY OPEN ANGLE GLAUCOMA OF RIGHT EYE, MODERATE STAGE: Primary | ICD-10-CM

## 2018-04-19 DIAGNOSIS — F32.A DEPRESSION, UNSPECIFIED DEPRESSION TYPE: ICD-10-CM

## 2018-04-19 DIAGNOSIS — E66.3 OVERWEIGHT (BMI 25.0-29.9): Chronic | ICD-10-CM

## 2018-04-19 DIAGNOSIS — H40.1112 PRIMARY OPEN ANGLE GLAUCOMA OF RIGHT EYE, MODERATE STAGE: ICD-10-CM

## 2018-04-19 DIAGNOSIS — E11.3592 PROLIFERATIVE DIABETIC RETINOPATHY OF LEFT EYE WITHOUT MACULAR EDEMA ASSOCIATED WITH TYPE 2 DIABETES MELLITUS: ICD-10-CM

## 2018-04-19 LAB — VIT B12 SERPL-MCNC: 327 PG/ML

## 2018-04-19 PROCEDURE — 92250 FUNDUS PHOTOGRAPHY W/I&R: CPT | Mod: PBBFAC | Performed by: OPHTHALMOLOGY

## 2018-04-19 PROCEDURE — 92014 COMPRE OPH EXAM EST PT 1/>: CPT | Mod: S$PBB,,, | Performed by: OPHTHALMOLOGY

## 2018-04-19 PROCEDURE — 83921 ORGANIC ACID SINGLE QUANT: CPT

## 2018-04-19 PROCEDURE — 82607 VITAMIN B-12: CPT

## 2018-04-19 PROCEDURE — 99999 PR PBB SHADOW E&M-EST. PATIENT-LVL III: CPT | Mod: PBBFAC,,, | Performed by: PSYCHIATRY & NEUROLOGY

## 2018-04-19 PROCEDURE — 99204 OFFICE O/P NEW MOD 45 MIN: CPT | Mod: S$PBB,,, | Performed by: PSYCHIATRY & NEUROLOGY

## 2018-04-19 PROCEDURE — 99212 OFFICE O/P EST SF 10 MIN: CPT | Mod: PBBFAC,27,25 | Performed by: OPHTHALMOLOGY

## 2018-04-19 PROCEDURE — 92083 EXTENDED VISUAL FIELD XM: CPT | Mod: 26,S$PBB,, | Performed by: OPHTHALMOLOGY

## 2018-04-19 PROCEDURE — 92083 EXTENDED VISUAL FIELD XM: CPT | Mod: PBBFAC

## 2018-04-19 PROCEDURE — 99213 OFFICE O/P EST LOW 20 MIN: CPT | Mod: PBBFAC,25 | Performed by: PSYCHIATRY & NEUROLOGY

## 2018-04-19 PROCEDURE — 99999 PR PBB SHADOW E&M-EST. PATIENT-LVL II: CPT | Mod: PBBFAC,,, | Performed by: OPHTHALMOLOGY

## 2018-04-19 NOTE — PATIENT INSTRUCTIONS
Discussed with patient.  He does have clinical evidence of a primarily sensory peripheral neuropathy most likely related to diabetes.  His symptoms are suggestive of bilateral meralgia paresthetica however given the history of back problems and the possibility of a lumbar radiculopathy.  We will get EMG/NCS bilateral lower extremities.  Control of diabetes stressed.  We will also get a vitamin B-12/methylmalonic acid level.  Reviewed immunization history.  Patient counseled about getting her immunization shots and is given a prescription for this.

## 2018-04-19 NOTE — PROGRESS NOTES
HPI     Glaucoma    Additional comments: HVF review today           Comments   DLS: 12/14/17    1) POAG OS>>OD  2) PCIOL OU  3) Type 2 DM  4) BDR OD  5) PDR OS  6) VH OS    MEDS:   T1/2 GFS QAM OU  Latanoprost QHS OS       Last edited by Vandana Caballero MA on 4/19/2018  2:04 PM. (History)            Assessment /Plan     For exam results, see Encounter Report.    Primary open angle glaucoma of right eye, moderate stage    Primary open angle glaucoma of left eye, severe stage    Controlled type 2 diabetes mellitus with left eye affected by proliferative retinopathy without macular edema, with long-term current use of insulin    Proliferative diabetic retinopathy of left eye without macular edema associated with type 2 diabetes mellitus        - Here for post - op phaco/IOL od - 2nd eye - again set for near     -  Post-OP PHACO/IOL OS - 5/18/2016 - set for near vision -   WANTS TO BE SET FOR NEAR - IS A MYOPE OU AND LIKES TO REMOVE GLASSES TO DO UP CLOSE WORK ON SMALL ELECTRONIC GADGETS   AIM FOR -2.25 TO -2.50     1. POAG od moderate stage // POAG severe stage OS  Followed as a borderline glaucoma with ocular hypertension [age] yo 2014     First HVF   2008   First photos   2011   Treatment / Drops started   7/2014              Dx made 4/2014             Family history    + sister /  + mom also a supect - Rx with gtts for a while then taken off        Glaucoma meds    none        H/O adverse rxn to glaucoma drops    none        LASERS    none        GLAUCOMA SURGERIES    none        OTHER EYE SURGERIES    Phaco/IOL OD 9/20/2017 Phaco/IOL OS 5/18/2016 - set for near OU        CDR    0.8/0.9        Tbase    17-26 / 17-26        Tmax    26/26          Ttarget    ?             HVF    6 test 2008 to  2018 - full /nonspecific defects  od // marked gen dep os        Gonio    +3 ou (no NVI or NVA os - s/p avastin for PDR)        CCT    544/539        OCT    2 test 2013 to 2014 - RNFL - OD:dec. T/N/I // OS:dec. T/Iayanna         "HRT    2 test ([place])  2008 to 2011 - MR - nl od // nl os                    3 test ([facility]) 2014 - 2016 - MR -  nl od // bord I os /// CDR 0.45 od // 0.56 os        Disc photos    2011 - OIS    - Ttoday   13/16  - Test done today    HVF / DFE / photos      3. Myopia, astigmatism, presbyopia  Glasses  Use to use [facility] [place] - [place]     4. Diabetes mellitus, type 2   + PDR os - s/p avastin #1 10/19/2014 - [facility]  S/P PRP OS   + BDR - od  Sees [facility]     5. VH OS- 2/2 DR - resolved  Saw [facility] - 9/3/2015   6. PC IOL os - 5/18/2016 -   -PCB00 18.5 - aimed for near vision per pts request (-2.50) // complex trypan blue   PC IOL od - 9/20/2017 - 18.5 - set for near / complex trypan blue        POAG - OS severe stage  POAG- OD moderate stage  + HVF loss os > OD  + OCT - RNFL changes ou  T base 17-26 ou  + fm hx - sister and ?mom  Cont  - Latanoprost - use OU again   IOP much better with addition of timolol 21/23 --> 14/13    PDR os and BDR od   S/P avastin os 10/19/2014   S/P PRP os   Seeing [facility]    -pt likes to remove his glasses and do his near vision work with out glasses   Was and  and still does a lot of small electronic repairs   OD  Pre - op is a  -2.50 sp eq  OS pre-op is a -3.25 sp eq   OS was set for  near vision at about a -2.25 to -2.50 - so it is "balanced with the right eye"     S/p CE - os  - 5/18/2016 - set for near  S/P Phaco / IOL OD Date: 9/20/2017 - Aim for -2.5D per patient request  ( PCB00 18.5 )     Cont glaucoma meds  Timolol gfs ou q am   Latanoprost qhs os ONLY - HOLD OD   OCT mac 9/8/17 - no macular edema - h/o PDR and ME - S/P avastin and S/P PRP - [facility]       F/u  4 months with gonio     I have seen and personally examined the patient.  I agree with the findings, assessment and plan of the resident and/or fellow.     "

## 2018-04-19 NOTE — PROGRESS NOTES
Subjective:       Patient ID: Jamarcus Mcqueen is a 67 y.o. male.    Chief Complaint:  Peripheral Neuropathy      History of Present Illness  HPI   This is a 67-year-old -American male was referred for evaluation of numbness and tingling in his legs bilaterally.  The patient relates that symptoms started a year ago at which time he was having some tingling and numbness on the lateral aspect of his right thigh.  It subsequently became bilateral buttock symptoms were intermittent and he cannot describe any triggers.  Over the past several months the symptoms are present daily with fluctuating intensity and he does have associated low back pain noted primarily in the morning however it gets better once he moves around.  He denies any history of trauma.  He denies any bladder difficulties.  He has had some numbness in his feet past.  The patient does have history of diabetes and his blood sugar control has improved.  A recent hemoglobin A1c was 6.5.  He denies any weakness.  He has no symptoms in the upper extremities and no neck pain.  He is a retired , having stopped working in 2007.       Review of Systems  Review of Systems   Constitutional: Negative.    HENT: Negative.  Negative for hearing loss.    Eyes: Negative.  Negative for visual disturbance.   Respiratory: Negative.  Negative for shortness of breath.    Cardiovascular: Negative.  Negative for chest pain and palpitations.   Gastrointestinal: Negative.    Endocrine: Negative.    Genitourinary: Negative.    Musculoskeletal: Positive for back pain. Negative for gait problem.   Skin: Negative.    Allergic/Immunologic: Negative.    Neurological: Positive for numbness. Negative for dizziness, tremors, seizures, syncope, speech difficulty, weakness and headaches.   Hematological: Negative.    Psychiatric/Behavioral: Negative.  Negative for decreased concentration and sleep disturbance.       Objective:      Neurologic Exam     Mental Status    Oriented to person, place, and time.   Registration: recalls 3 of 3 objects. Follows 3 step commands.   Attention: normal. Concentration: normal.   Speech: speech is normal   Level of consciousness: alert  Knowledge: good.   Able to name object. Able to read. Able to repeat. Able to write. Normal comprehension.     Cranial Nerves   Cranial nerves II through XII intact.     Motor Exam   Muscle bulk: normal  Overall muscle tone: normal    Strength   Strength 5/5 throughout.     Sensory Exam   Right arm light touch: normal  Left arm light touch: normal  Right leg light touch: decreased from toes  Left leg light touch: decreased from toes  Right arm vibration: normal  Left arm vibration: normal  Right leg vibration: decreased from ankle (Vibration sense less than 5 seconds at the toes)  Left leg vibration: decreased from ankle (Vibration sense less than 5 seconds at the toes)  Right arm pinprick: normal  Left arm pinprick: normal  Right leg pinprick: decreased from ankle  Left leg pinprick: decreased from ankle  Sensory deficit distribution on right: lateral cutaneous thigh  Sensory deficit distribution on left: lateral cutaneous thigh  No tenderness in the groin or inguinal.  region     Gait, Coordination, and Reflexes     Gait  Gait: normal    Coordination   Romberg: negative  Finger to nose coordination: normal    Tremor   Resting tremor: absent  Intention tremor: absent  Action tremor: absent    Reflexes   Right brachioradialis: 0  Left brachioradialis: 0  Right biceps: 0  Left biceps: 0  Right triceps: 1+  Left triceps: 1+  Right patellar: 0  Left patellar: 0  Right achilles: 0  Left achilles: 0  Right plantar: normal  Left plantar: normal      Physical Exam   Constitutional: He is oriented to person, place, and time. He appears well-developed and well-nourished.   HENT:   Head: Normocephalic and atraumatic.   Neck: Normal range of motion. Neck supple. Carotid bruit is not present.   Musculoskeletal:         Lumbar back: He exhibits no tenderness, no bony tenderness, no swelling and no deformity.   SLR 70-80° bilaterally with minimal low back pain, nonradiating.   Neurological: He is oriented to person, place, and time. He has normal strength. He has a normal Finger-Nose-Finger Test and a normal Romberg Test. Gait normal.   Reflex Scores:       Tricep reflexes are 1+ on the right side and 1+ on the left side.       Bicep reflexes are 0 on the right side and 0 on the left side.       Brachioradialis reflexes are 0 on the right side and 0 on the left side.       Patellar reflexes are 0 on the right side and 0 on the left side.       Achilles reflexes are 0 on the right side and 0 on the left side.  Psychiatric: His speech is normal.   Vitals reviewed.        Assessment:        1. Type 2 diabetes mellitus with diabetic polyneuropathy, with long-term current use of insulin    2. Overweight (BMI 25.0-29.9)    3. Hypertension, essential    4. Hyperlipidemia, unspecified hyperlipidemia type    5. Depression, unspecified depression type    6. CKD (chronic kidney disease) stage 3, GFR 30-59 ml/min    7. Meralgia paresthetica, unspecified laterality    8. Idiopathic progressive neuropathy             Plan:       Discussed with patient.  He does have clinical evidence of a primarily sensory peripheral neuropathy most likely related to diabetes.  His symptoms are suggestive of bilateral meralgia paresthetica however given the history of back problems and the possibility of a lumbar radiculopathy.  We will get EMG/NCS bilateral lower extremities.  Control of diabetes stressed.  We will also get a vitamin B-12/methylmalonic acid level.  Reviewed immunization history.  Patient counseled about getting her immunization shots and is given a prescription for this. Follow-up after the EMG/NCS.

## 2018-04-24 LAB — METHYLMALONATE SERPL-SCNC: 0.29 UMOL/L

## 2018-05-25 ENCOUNTER — PROCEDURE VISIT (OUTPATIENT)
Dept: NEUROLOGY | Facility: CLINIC | Age: 68
End: 2018-05-25
Payer: MEDICARE

## 2018-05-25 DIAGNOSIS — G57.10 MERALGIA PARESTHETICA, UNSPECIFIED LATERALITY: ICD-10-CM

## 2018-05-25 DIAGNOSIS — N18.30 CKD (CHRONIC KIDNEY DISEASE) STAGE 3, GFR 30-59 ML/MIN: ICD-10-CM

## 2018-05-25 DIAGNOSIS — Z79.4 TYPE 2 DIABETES MELLITUS WITH DIABETIC POLYNEUROPATHY, WITH LONG-TERM CURRENT USE OF INSULIN: Chronic | ICD-10-CM

## 2018-05-25 DIAGNOSIS — E11.42 TYPE 2 DIABETES MELLITUS WITH DIABETIC POLYNEUROPATHY, WITH LONG-TERM CURRENT USE OF INSULIN: Chronic | ICD-10-CM

## 2018-05-25 PROCEDURE — 95911 NRV CNDJ TEST 9-10 STUDIES: CPT | Mod: PBBFAC,PO | Performed by: NEUROMUSCULOSKELETAL MEDICINE & OMM

## 2018-05-25 PROCEDURE — 95886 MUSC TEST DONE W/N TEST COMP: CPT | Mod: 26,S$PBB,, | Performed by: NEUROMUSCULOSKELETAL MEDICINE & OMM

## 2018-05-25 PROCEDURE — 95886 MUSC TEST DONE W/N TEST COMP: CPT | Mod: PBBFAC,PO | Performed by: NEUROMUSCULOSKELETAL MEDICINE & OMM

## 2018-05-25 PROCEDURE — 95910 NRV CNDJ TEST 7-8 STUDIES: CPT | Mod: 26,S$PBB,, | Performed by: NEUROMUSCULOSKELETAL MEDICINE & OMM

## 2018-06-08 RX ORDER — EZETIMIBE 10 MG/1
TABLET ORAL
Qty: 90 TABLET | Refills: 0 | Status: SHIPPED | OUTPATIENT
Start: 2018-06-08 | End: 2018-09-06 | Stop reason: SDUPTHER

## 2018-06-12 RX ORDER — SIMVASTATIN 40 MG/1
TABLET, FILM COATED ORAL
Qty: 90 TABLET | Refills: 1 | Status: SHIPPED | OUTPATIENT
Start: 2018-06-12 | End: 2018-12-03 | Stop reason: SDUPTHER

## 2018-06-29 ENCOUNTER — OFFICE VISIT (OUTPATIENT)
Dept: NEPHROLOGY | Facility: CLINIC | Age: 68
End: 2018-06-29
Payer: MEDICARE

## 2018-06-29 VITALS
HEIGHT: 73 IN | DIASTOLIC BLOOD PRESSURE: 70 MMHG | HEART RATE: 84 BPM | BODY MASS INDEX: 29.54 KG/M2 | SYSTOLIC BLOOD PRESSURE: 100 MMHG | OXYGEN SATURATION: 98 % | WEIGHT: 222.88 LBS

## 2018-06-29 DIAGNOSIS — E11.22 CONTROLLED TYPE 2 DIABETES MELLITUS WITH STAGE 3 CHRONIC KIDNEY DISEASE, WITH LONG-TERM CURRENT USE OF INSULIN: Primary | ICD-10-CM

## 2018-06-29 DIAGNOSIS — N18.30 CONTROLLED TYPE 2 DIABETES MELLITUS WITH STAGE 3 CHRONIC KIDNEY DISEASE, WITH LONG-TERM CURRENT USE OF INSULIN: Primary | ICD-10-CM

## 2018-06-29 DIAGNOSIS — I12.9 BENIGN HYPERTENSIVE RENAL DISEASE WITHOUT RENAL FAILURE: ICD-10-CM

## 2018-06-29 DIAGNOSIS — Z79.4 CONTROLLED TYPE 2 DIABETES MELLITUS WITH STAGE 3 CHRONIC KIDNEY DISEASE, WITH LONG-TERM CURRENT USE OF INSULIN: Primary | ICD-10-CM

## 2018-06-29 DIAGNOSIS — N18.30 ANEMIA OF CHRONIC RENAL FAILURE, STAGE 3 (MODERATE): ICD-10-CM

## 2018-06-29 DIAGNOSIS — D63.1 ANEMIA OF CHRONIC RENAL FAILURE, STAGE 3 (MODERATE): ICD-10-CM

## 2018-06-29 PROCEDURE — 99213 OFFICE O/P EST LOW 20 MIN: CPT | Mod: S$PBB,,, | Performed by: INTERNAL MEDICINE

## 2018-06-29 PROCEDURE — 99999 PR PBB SHADOW E&M-EST. PATIENT-LVL III: CPT | Mod: PBBFAC,,, | Performed by: INTERNAL MEDICINE

## 2018-06-29 PROCEDURE — 99213 OFFICE O/P EST LOW 20 MIN: CPT | Mod: PBBFAC,27 | Performed by: INTERNAL MEDICINE

## 2018-07-10 NOTE — PROGRESS NOTES
Subjective:       Patient ID: Jamarcus Mcqueen is a 68 y.o. Black or  male who presents for follow-up evaluation of Chronic Kidney Disease    HPI     Mr. Mcqueen is a 68 year old man with past medical history of diabetes, hypertension presenting for follow up of chronic kidney disease.  Patient reports blood sugars, blood pressure well-controlled.  He reports adequate fluid intake, denies any NSAID use.  He otherwise denies any fever, chest pain, shortness of breath, abdominal pain, diarrhea, dysuria/hematuria.     Review of Systems   Constitutional: Negative for appetite change, fatigue and fever.   Respiratory: Negative for cough and shortness of breath.    Cardiovascular: Negative for chest pain and leg swelling.   Gastrointestinal: Negative for abdominal pain, constipation, diarrhea, nausea and vomiting.   Genitourinary: Negative for dysuria, flank pain, frequency, hematuria and urgency.   Musculoskeletal: Negative for arthralgias, back pain and joint swelling.   Skin: Negative for rash.   Neurological: Negative for dizziness and light-headedness.   All other systems reviewed and are negative.      Objective:      Physical Exam   Constitutional: He appears well-developed and well-nourished.   Cardiovascular: Normal rate and regular rhythm.  Exam reveals no gallop and no friction rub.    No murmur heard.  Pulmonary/Chest: Effort normal and breath sounds normal. No respiratory distress. He has no wheezes. He has no rales.   Musculoskeletal: He exhibits no edema.   Neurological: He is alert.   Skin: Skin is warm and dry. No rash noted.   Vitals reviewed.      Assessment:       1. Controlled type 2 diabetes mellitus with stage 3 chronic kidney disease, with long-term current use of insulin    2. Benign hypertensive renal disease without renal failure    3. Anemia of chronic renal failure, stage 3 (moderate)        Plan:      Mr. Mcqueen is a 68 year old man with past medical history of diabetes,  hypertension presenting for follow up of chronic kidney disease.  Patient creatinine 1.4-1.6mg/dL for the past several years, consistent with CKD stage II/III.  Creatinine relatively stable, proteinuria negligible on ARB, will continue to trend labs.  Stressed importance of blood pressure/glycemic control to prevent any further progression of kidney disease, patient voiced understanding.   - Anemia: Hgb at goal, no indication for erythropoetin therapy  - Bone/mineral metabolism: will trend PTH/VitD    Return to clinic in 6 months with renal/heme panel, iron/TIBC/ferritin, urinalysis/culture, urine protein/creatinine ratio, PTH/VitD prior to next visit

## 2018-07-30 DIAGNOSIS — H40.1123 PRIMARY OPEN ANGLE GLAUCOMA OF LEFT EYE, SEVERE STAGE: ICD-10-CM

## 2018-07-30 DIAGNOSIS — H40.1112 PRIMARY OPEN ANGLE GLAUCOMA OF RIGHT EYE, MODERATE STAGE: ICD-10-CM

## 2018-07-30 RX ORDER — LATANOPROST 50 UG/ML
SOLUTION/ DROPS OPHTHALMIC
Qty: 7.5 ML | Refills: 3 | Status: SHIPPED | OUTPATIENT
Start: 2018-07-30 | End: 2018-12-28 | Stop reason: SDUPTHER

## 2018-07-30 RX ORDER — LOSARTAN POTASSIUM 25 MG/1
TABLET ORAL
Qty: 90 TABLET | Refills: 0 | Status: SHIPPED | OUTPATIENT
Start: 2018-07-30 | End: 2018-10-09 | Stop reason: SDUPTHER

## 2018-08-21 ENCOUNTER — OFFICE VISIT (OUTPATIENT)
Dept: OPHTHALMOLOGY | Facility: CLINIC | Age: 68
End: 2018-08-21
Payer: MEDICARE

## 2018-08-21 DIAGNOSIS — Z79.4 CONTROLLED TYPE 2 DIABETES MELLITUS WITH LEFT EYE AFFECTED BY PROLIFERATIVE RETINOPATHY WITHOUT MACULAR EDEMA, WITH LONG-TERM CURRENT USE OF INSULIN: ICD-10-CM

## 2018-08-21 DIAGNOSIS — Z96.1 PSEUDOPHAKIA, BOTH EYES: ICD-10-CM

## 2018-08-21 DIAGNOSIS — E11.3592 CONTROLLED TYPE 2 DIABETES MELLITUS WITH LEFT EYE AFFECTED BY PROLIFERATIVE RETINOPATHY WITHOUT MACULAR EDEMA, WITH LONG-TERM CURRENT USE OF INSULIN: ICD-10-CM

## 2018-08-21 DIAGNOSIS — H40.1123 PRIMARY OPEN ANGLE GLAUCOMA OF LEFT EYE, SEVERE STAGE: ICD-10-CM

## 2018-08-21 DIAGNOSIS — E11.3592 PROLIFERATIVE DIABETIC RETINOPATHY OF LEFT EYE WITHOUT MACULAR EDEMA ASSOCIATED WITH TYPE 2 DIABETES MELLITUS: ICD-10-CM

## 2018-08-21 DIAGNOSIS — Z98.49 POSTOPERATIVE CARE FOR CATARACT: ICD-10-CM

## 2018-08-21 DIAGNOSIS — H40.1112 PRIMARY OPEN ANGLE GLAUCOMA OF RIGHT EYE, MODERATE STAGE: Primary | ICD-10-CM

## 2018-08-21 DIAGNOSIS — Z48.810 POSTOPERATIVE CARE FOR CATARACT: ICD-10-CM

## 2018-08-21 PROCEDURE — 99213 OFFICE O/P EST LOW 20 MIN: CPT | Mod: PBBFAC | Performed by: OPHTHALMOLOGY

## 2018-08-21 PROCEDURE — 99999 PR PBB SHADOW E&M-EST. PATIENT-LVL III: CPT | Mod: PBBFAC,,, | Performed by: OPHTHALMOLOGY

## 2018-08-21 PROCEDURE — 92012 INTRM OPH EXAM EST PATIENT: CPT | Mod: S$PBB,,, | Performed by: OPHTHALMOLOGY

## 2018-08-21 RX ORDER — TIMOLOL MALEATE 5 MG/ML
1 SOLUTION OPHTHALMIC DAILY
Qty: 5 ML | Refills: 11 | Status: SHIPPED | OUTPATIENT
Start: 2018-08-21 | End: 2018-12-28 | Stop reason: SDUPTHER

## 2018-08-21 NOTE — PROGRESS NOTES
Assessment /Plan     For exam results, see Encounter Report.    Primary open angle glaucoma of right eye, moderate stage    Primary open angle glaucoma of left eye, severe stage    Controlled type 2 diabetes mellitus with left eye affected by proliferative retinopathy without macular edema, with long-term current use of insulin    Proliferative diabetic retinopathy of left eye without macular edema associated with type 2 diabetes mellitus    Postoperative care for cataract    Pseudophakia, both eyes        - Here for post - op phaco/IOL od - 2nd eye - again set for near     -  Post-OP PHACO/IOL OS - 5/18/2016 - set for near vision -   WANTS TO BE SET FOR NEAR - IS A MYOPE OU AND LIKES TO REMOVE GLASSES TO DO UP CLOSE WORK ON SMALL ELECTRONIC GADGETS   AIM FOR -2.25 TO -2.50     1. POAG od moderate stage // POAG severe stage OS  Followed as a borderline glaucoma with ocular hypertension [age] yo 2014     First HVF   2008   First photos   2011   Treatment / Drops started   7/2014              Dx made 4/2014             Family history    + sister /  + mom also a supect - Rx with gtts for a while then taken off        Glaucoma meds    none        H/O adverse rxn to glaucoma drops    none        LASERS    none        GLAUCOMA SURGERIES    none        OTHER EYE SURGERIES    Phaco/IOL OD 9/20/2017 Phaco/IOL OS 5/18/2016 - set for near OU        CDR    0.8/0.9        Tbase    17-26 / 17-26        Tmax    26/26          Ttarget    ?             HVF    6 test 2008 to  2018 - full /nonspecific defects  od // marked gen dep os        Gonio    +3 ou (no NVI or NVA os - s/p avastin for PDR)        CCT    544/539        OCT    2 test 2013 to 2014 - RNFL - OD:dec. T/N/I // OS:dec. T/I, bord N        HRT    2 test ([place])  2008 to 2011 - MR - nl od // nl os                    3 test ([facility] 2014 - 2016 - MR -  nl od // bord I os /// CDR 0.45 od // 0.56 os        Disc photos    2011 - OIS    - Ttoday   10/12  - Test  "done today    gonio      3. Myopia, astigmatism, presbyopia  Glasses  Use to use CTL's [place] [facility] [place]     4. Diabetes mellitus, type 2   + PDR os - s/p avastin #1 10/19/2014 - [place]  S/P PRP OS   + BDR - od  Sees Pumarosario     5. VH OS- 2/2 DR - resolved  Saw Susan - 9/3/2015   6. PC IOL os - 5/18/2016 -   -PCB00 18.5 - aimed for near vision per pts request (-2.50) // complex trypan blue   PC IOL od - 9/20/2017 - 18.5 - set for near / complex trypan blue        POAG - OS severe stage  POAG- OD moderate stage  + HVF loss os > OD  + OCT - RNFL changes ou  T base 17-26 ou  + fm hx - sister and ?mom  Cont  - Latanoprost - use OU again   IOP much better with addition of timolol 21/23 --> 14/13    PDR os and BDR od   S/P avastin os 10/19/2014   S/P PRP os   Seeing susan    -pt likes to remove his glasses and do his near vision work with out glasses   Was and  and still does a lot of small electronic repairs   OD  Pre - op is a  -2.50 sp eq  OS pre-op is a -3.25 sp eq   OS was set for  near vision at about a -2.25 to -2.50 - so it is "balanced with the right eye"     S/p CE - os  - 5/18/2016 - set for near  S/P Phaco / IOL OD Date: 9/20/2017 - Aim for -2.5D per patient request  ( PCB00 18.5 )     Doing well  Cont glaucoma meds  Timolol gfs ou q am   Latanoprost qhs OU        F/u  4 months with HRT                 "

## 2018-09-06 RX ORDER — EZETIMIBE 10 MG/1
TABLET ORAL
Qty: 90 TABLET | Refills: 0 | Status: SHIPPED | OUTPATIENT
Start: 2018-09-06 | End: 2018-12-05 | Stop reason: SDUPTHER

## 2018-10-05 DIAGNOSIS — E11.9 TYPE 2 DIABETES MELLITUS WITHOUT COMPLICATION: ICD-10-CM

## 2018-10-10 RX ORDER — LOSARTAN POTASSIUM 25 MG/1
TABLET ORAL
Qty: 90 TABLET | Refills: 0 | Status: SHIPPED | OUTPATIENT
Start: 2018-10-10 | End: 2019-01-03 | Stop reason: SDUPTHER

## 2018-12-04 RX ORDER — SIMVASTATIN 40 MG/1
TABLET, FILM COATED ORAL
Qty: 90 TABLET | Refills: 0 | Status: SHIPPED | OUTPATIENT
Start: 2018-12-04 | End: 2019-03-04 | Stop reason: SDUPTHER

## 2018-12-05 RX ORDER — EZETIMIBE 10 MG/1
TABLET ORAL
Qty: 90 TABLET | Refills: 0 | Status: SHIPPED | OUTPATIENT
Start: 2018-12-05 | End: 2019-03-04 | Stop reason: SDUPTHER

## 2018-12-10 RX ORDER — INSULIN GLARGINE 100 [IU]/ML
INJECTION, SOLUTION SUBCUTANEOUS
Qty: 90 ML | Refills: 1 | Status: SHIPPED | OUTPATIENT
Start: 2018-12-10 | End: 2019-12-06 | Stop reason: SDUPTHER

## 2018-12-28 ENCOUNTER — OFFICE VISIT (OUTPATIENT)
Dept: NEPHROLOGY | Facility: CLINIC | Age: 68
End: 2018-12-28
Payer: MEDICARE

## 2018-12-28 ENCOUNTER — OFFICE VISIT (OUTPATIENT)
Dept: OPHTHALMOLOGY | Facility: CLINIC | Age: 68
End: 2018-12-28
Payer: MEDICARE

## 2018-12-28 ENCOUNTER — LAB VISIT (OUTPATIENT)
Dept: LAB | Facility: HOSPITAL | Age: 68
End: 2018-12-28
Attending: INTERNAL MEDICINE
Payer: MEDICARE

## 2018-12-28 ENCOUNTER — CLINICAL SUPPORT (OUTPATIENT)
Dept: OPHTHALMOLOGY | Facility: CLINIC | Age: 68
End: 2018-12-28
Payer: MEDICARE

## 2018-12-28 VITALS
HEIGHT: 73 IN | BODY MASS INDEX: 30.27 KG/M2 | WEIGHT: 228.38 LBS | OXYGEN SATURATION: 98 % | SYSTOLIC BLOOD PRESSURE: 120 MMHG | HEART RATE: 87 BPM | DIASTOLIC BLOOD PRESSURE: 80 MMHG

## 2018-12-28 DIAGNOSIS — H40.1112 PRIMARY OPEN ANGLE GLAUCOMA OF RIGHT EYE, MODERATE STAGE: ICD-10-CM

## 2018-12-28 DIAGNOSIS — Z79.4 CONTROLLED TYPE 2 DIABETES MELLITUS WITH LEFT EYE AFFECTED BY PROLIFERATIVE RETINOPATHY WITHOUT MACULAR EDEMA, WITH LONG-TERM CURRENT USE OF INSULIN: ICD-10-CM

## 2018-12-28 DIAGNOSIS — Z96.1 PSEUDOPHAKIA, BOTH EYES: ICD-10-CM

## 2018-12-28 DIAGNOSIS — E55.9 VITAMIN D DEFICIENCY: ICD-10-CM

## 2018-12-28 DIAGNOSIS — E11.22 CONTROLLED TYPE 2 DIABETES MELLITUS WITH STAGE 3 CHRONIC KIDNEY DISEASE, WITH LONG-TERM CURRENT USE OF INSULIN: ICD-10-CM

## 2018-12-28 DIAGNOSIS — Z79.4 CONTROLLED TYPE 2 DIABETES MELLITUS WITH STAGE 3 CHRONIC KIDNEY DISEASE, WITH LONG-TERM CURRENT USE OF INSULIN: ICD-10-CM

## 2018-12-28 DIAGNOSIS — N18.30 CONTROLLED TYPE 2 DIABETES MELLITUS WITH STAGE 3 CHRONIC KIDNEY DISEASE, WITH LONG-TERM CURRENT USE OF INSULIN: ICD-10-CM

## 2018-12-28 DIAGNOSIS — N18.2 CHRONIC KIDNEY DISEASE, STAGE II (MILD): Primary | ICD-10-CM

## 2018-12-28 DIAGNOSIS — E11.3592 PROLIFERATIVE DIABETIC RETINOPATHY OF LEFT EYE WITHOUT MACULAR EDEMA ASSOCIATED WITH TYPE 2 DIABETES MELLITUS: ICD-10-CM

## 2018-12-28 DIAGNOSIS — H40.1112 PRIMARY OPEN ANGLE GLAUCOMA OF RIGHT EYE, MODERATE STAGE: Primary | ICD-10-CM

## 2018-12-28 DIAGNOSIS — H40.1123 PRIMARY OPEN ANGLE GLAUCOMA OF LEFT EYE, SEVERE STAGE: ICD-10-CM

## 2018-12-28 DIAGNOSIS — E11.3592 CONTROLLED TYPE 2 DIABETES MELLITUS WITH LEFT EYE AFFECTED BY PROLIFERATIVE RETINOPATHY WITHOUT MACULAR EDEMA, WITH LONG-TERM CURRENT USE OF INSULIN: ICD-10-CM

## 2018-12-28 LAB
BILIRUB UR QL STRIP: NEGATIVE
CLARITY UR REFRACT.AUTO: CLEAR
COLOR UR AUTO: NORMAL
CREAT UR-MCNC: 40 MG/DL
GLUCOSE UR QL STRIP: NEGATIVE
HGB UR QL STRIP: NEGATIVE
KETONES UR QL STRIP: NEGATIVE
LEUKOCYTE ESTERASE UR QL STRIP: NEGATIVE
NITRITE UR QL STRIP: NEGATIVE
PH UR STRIP: 7 [PH] (ref 5–8)
PROT UR QL STRIP: NEGATIVE
PROT UR-MCNC: <7 MG/DL
PROT/CREAT UR: NORMAL MG/G{CREAT}
SP GR UR STRIP: 1 (ref 1–1.03)
URN SPEC COLLECT METH UR: NORMAL

## 2018-12-28 PROCEDURE — 99212 OFFICE O/P EST SF 10 MIN: CPT | Mod: PBBFAC,25 | Performed by: OPHTHALMOLOGY

## 2018-12-28 PROCEDURE — 92133 CPTRZD OPH DX IMG PST SGM ON: CPT | Mod: 26,S$PBB,, | Performed by: OPHTHALMOLOGY

## 2018-12-28 PROCEDURE — 92012 INTRM OPH EXAM EST PATIENT: CPT | Mod: S$PBB,,, | Performed by: OPHTHALMOLOGY

## 2018-12-28 PROCEDURE — 99213 OFFICE O/P EST LOW 20 MIN: CPT | Mod: PBBFAC,25,27 | Performed by: INTERNAL MEDICINE

## 2018-12-28 PROCEDURE — 99213 OFFICE O/P EST LOW 20 MIN: CPT | Mod: S$PBB,,, | Performed by: INTERNAL MEDICINE

## 2018-12-28 PROCEDURE — 82570 ASSAY OF URINE CREATININE: CPT

## 2018-12-28 PROCEDURE — 92133 CPTRZD OPH DX IMG PST SGM ON: CPT | Mod: PBBFAC

## 2018-12-28 PROCEDURE — 99999 PR PBB SHADOW E&M-EST. PATIENT-LVL II: CPT | Mod: PBBFAC,,, | Performed by: OPHTHALMOLOGY

## 2018-12-28 PROCEDURE — 99999 PR PBB SHADOW E&M-EST. PATIENT-LVL III: CPT | Mod: PBBFAC,,, | Performed by: INTERNAL MEDICINE

## 2018-12-28 PROCEDURE — 81003 URINALYSIS AUTO W/O SCOPE: CPT

## 2018-12-28 RX ORDER — LATANOPROST 50 UG/ML
1 SOLUTION/ DROPS OPHTHALMIC NIGHTLY
Qty: 3 BOTTLE | Refills: 3 | Status: SHIPPED | OUTPATIENT
Start: 2018-12-28 | End: 2019-12-31

## 2018-12-28 RX ORDER — TIMOLOL MALEATE 5 MG/ML
1 SOLUTION OPHTHALMIC DAILY
Qty: 15 ML | Refills: 3 | Status: SHIPPED | OUTPATIENT
Start: 2018-12-28 | End: 2019-12-26

## 2018-12-28 NOTE — PROGRESS NOTES
Assessment /Plan     For exam results, see Encounter Report.    Primary open angle glaucoma of right eye, moderate stage    Primary open angle glaucoma of left eye, severe stage    Controlled type 2 diabetes mellitus with left eye affected by proliferative retinopathy without macular edema, with long-term current use of insulin    Proliferative diabetic retinopathy of left eye without macular edema associated with type 2 diabetes mellitus    Pseudophakia, both eyes          - Here for post - op phaco/IOL od - 2nd eye - again set for near     -  Post-OP PHACO/IOL OS - 5/18/2016 - set for near vision -   WANTS TO BE SET FOR NEAR - IS A MYOPE OU AND LIKES TO REMOVE GLASSES TO DO UP CLOSE WORK ON SMALL ELECTRONIC GADGETS   AIM FOR -2.25 TO -2.50     1. POAG od moderate stage // POAG severe stage OS  Followed as a borderline glaucoma with ocular hypertension 2005 yo 2014     First HVF   2008   First photos   2011   Treatment / Drops started   7/2014              Dx made 4/2014             Family history    + sister /  + mom also a supect - Rx with gtts for a while then taken off        Glaucoma meds    none        H/O adverse rxn to glaucoma drops    none        LASERS    none        GLAUCOMA SURGERIES    none        OTHER EYE SURGERIES    Phaco/IOL OD 9/20/2017 Phaco/IOL OS 5/18/2016 - set for near OU        CDR    0.8/0.9        Tbase    17-26 / 17-26        Tmax    26/26          Ttarget   16-17 ou             HVF    6 test 2008 to  2018 - full /nonspecific defects  od // marked gen dep os        Gonio    +3 ou (no NVI or NVA os - s/p avastin for PDR)        CCT    544/539        OCT    2 test 2013 to 2014 - RNFL - OD:dec. T/N/I // OS:dec. T/Iayanna        HRT    2 test ([place])  2008 to 2011 - MR - nl od // nl os                    4 test ([facility]) 2014 - 2018 -  -  [place] off od // dec. Iayanna os         Disc photos    2011 - OIS    - Ttoday   10/12  - Test done today    HRT      3. Myopia,  "astigmatism, presbyopia  Glasses  Use to use CTL's [place] [facility] [place]     4. Diabetes mellitus, type 2   + PDR os - s/p avastin #1 10/19/2014 - [place]  S/P PRP OS   + BDR - od  Sees Jose Angel     5. VH OS- 2/2 DR - resolved  Saw Colepatel - 9/3/2015   6. PC IOL os - 5/18/2016 -   -PCB00 18.5 - aimed for near vision per pts request (-2.50) // complex trypan blue   PC IOL od - 9/20/2017 - 18.5 - set for near / complex trypan blue        POAG - OS severe stage  POAG- OD moderate stage  + HVF loss os > OD  + OCT - RNFL changes ou  T base 17-26 ou  + fm hx - sister and ?mom  Cont  - Latanoprost - use OU again   IOP much better with addition of timolol 21/23 --> 10-14/12-13    PDR os and BDR od   S/P avastin os 10/19/2014   S/P PRP os   Seeing jose angel    -pt likes to remove his glasses and do his near vision work with out glasses   Was and  and still does a lot of small electronic repairs   OD  Pre - op is a  -2.50 sp eq  OS pre-op is a -3.25 sp eq   OS was set for  near vision at about a -2.25 to -2.50 - so it is "balanced with the right eye"     S/p CE - os  - 5/18/2016 - set for near  S/P Phaco / IOL OD Date: 9/20/2017 - Aim for -2.5D per patient request  ( PCB00 18.5 )     Doing well  Cont glaucoma meds  Timolol gfs ou q am   Latanoprost qhs OU        F/u  4 months with HVF / DFE / OCT     "

## 2018-12-31 NOTE — PROGRESS NOTES
Subjective:       Patient ID: Jamarcus Mcqueen is a 68 y.o. Black or  male who presents for follow-up evaluation of Chronic Kidney Disease    HPI     Mr. Mcqueen is a 68 year old man with past medical history of diabetes, hypertension presenting for follow up of chronic kidney disease.  Patient reports blood sugars, blood pressure well-controlled.  He reports adequate fluid intake, denies any NSAID use.  He otherwise denies any fever, chest pain, shortness of breath, abdominal pain, diarrhea, dysuria/hematuria.     Review of Systems   Constitutional: Negative for appetite change, fatigue and fever.   Respiratory: Negative for cough and shortness of breath.    Cardiovascular: Negative for chest pain and leg swelling.   Gastrointestinal: Negative for abdominal pain, constipation, diarrhea, nausea and vomiting.   Genitourinary: Negative for dysuria, flank pain, frequency, hematuria and urgency.   Musculoskeletal: Negative for arthralgias, back pain and joint swelling.   Skin: Negative for rash.   Neurological: Negative for dizziness and light-headedness.   All other systems reviewed and are negative.      Objective:      Physical Exam   Constitutional: He appears well-developed and well-nourished.   Cardiovascular: Normal rate and regular rhythm. Exam reveals no gallop and no friction rub.   No murmur heard.  Pulmonary/Chest: Effort normal and breath sounds normal. No respiratory distress. He has no wheezes. He has no rales.   Musculoskeletal: He exhibits no edema.   Neurological: He is alert.   Skin: Skin is warm and dry. No rash noted.   Vitals reviewed.      Assessment:       1. Chronic kidney disease, stage II (mild)    2. Controlled type 2 diabetes mellitus with stage 3 chronic kidney disease, with long-term current use of insulin        Plan:      Mr. Mcqueen is a 68 year old man with past medical history of diabetes, hypertension presenting for follow up of chronic kidney disease.  Patient creatinine  1.4-1.6mg/dL for the past several years, consistent with CKD stage II/III.  Creatinine relatively stable, proteinuria negligible on ARB, will continue to trend labs.  Stressed importance of blood pressure/glycemic control to prevent any further progression of kidney disease, patient voiced understanding.   - Anemia: Hgb at goal, no indication for erythropoetin therapy  - Bone/mineral metabolism: will trend PTH/VitD    Return to clinic in 6-8 months pending renal panel next month, then with renal/heme panel, iron/TIBC/ferritin, urinalysis/culture, urine protein/creatinine ratio, PTH/VitD prior to next visit

## 2019-01-02 ENCOUNTER — PES CALL (OUTPATIENT)
Dept: ADMINISTRATIVE | Facility: CLINIC | Age: 69
End: 2019-01-02

## 2019-01-04 RX ORDER — LOSARTAN POTASSIUM 25 MG/1
TABLET ORAL
Qty: 90 TABLET | Refills: 0 | Status: SHIPPED | OUTPATIENT
Start: 2019-01-04 | End: 2019-03-17 | Stop reason: SDUPTHER

## 2019-01-11 ENCOUNTER — OFFICE VISIT (OUTPATIENT)
Dept: INTERNAL MEDICINE | Facility: CLINIC | Age: 69
End: 2019-01-11
Payer: MEDICARE

## 2019-01-11 VITALS
HEIGHT: 73 IN | DIASTOLIC BLOOD PRESSURE: 59 MMHG | SYSTOLIC BLOOD PRESSURE: 100 MMHG | WEIGHT: 225.31 LBS | HEART RATE: 71 BPM | OXYGEN SATURATION: 97 % | BODY MASS INDEX: 29.86 KG/M2

## 2019-01-11 DIAGNOSIS — Z00.00 ENCOUNTER FOR PREVENTIVE HEALTH EXAMINATION: Primary | ICD-10-CM

## 2019-01-11 DIAGNOSIS — E11.3393 MODERATE NONPROLIFERATIVE DIABETIC RETINOPATHY OF BOTH EYES WITHOUT MACULAR EDEMA ASSOCIATED WITH TYPE 2 DIABETES MELLITUS: ICD-10-CM

## 2019-01-11 DIAGNOSIS — E11.3592 CONTROLLED TYPE 2 DIABETES MELLITUS WITH LEFT EYE AFFECTED BY PROLIFERATIVE RETINOPATHY WITHOUT MACULAR EDEMA, WITH LONG-TERM CURRENT USE OF INSULIN: ICD-10-CM

## 2019-01-11 DIAGNOSIS — E78.5 HYPERLIPIDEMIA, UNSPECIFIED HYPERLIPIDEMIA TYPE: ICD-10-CM

## 2019-01-11 DIAGNOSIS — E11.3592 PROLIFERATIVE DIABETIC RETINOPATHY OF LEFT EYE WITHOUT MACULAR EDEMA ASSOCIATED WITH TYPE 2 DIABETES MELLITUS: ICD-10-CM

## 2019-01-11 DIAGNOSIS — Z79.4 CONTROLLED TYPE 2 DIABETES MELLITUS WITH RIGHT EYE AFFECTED BY MODERATE NONPROLIFERATIVE RETINOPATHY WITHOUT MACULAR EDEMA, WITH LONG-TERM CURRENT USE OF INSULIN: ICD-10-CM

## 2019-01-11 DIAGNOSIS — Z79.4 CONTROLLED TYPE 2 DIABETES MELLITUS WITH LEFT EYE AFFECTED BY PROLIFERATIVE RETINOPATHY WITHOUT MACULAR EDEMA, WITH LONG-TERM CURRENT USE OF INSULIN: ICD-10-CM

## 2019-01-11 DIAGNOSIS — E11.3391 CONTROLLED TYPE 2 DIABETES MELLITUS WITH RIGHT EYE AFFECTED BY MODERATE NONPROLIFERATIVE RETINOPATHY WITHOUT MACULAR EDEMA, WITH LONG-TERM CURRENT USE OF INSULIN: ICD-10-CM

## 2019-01-11 DIAGNOSIS — N18.30 CKD (CHRONIC KIDNEY DISEASE) STAGE 3, GFR 30-59 ML/MIN: ICD-10-CM

## 2019-01-11 DIAGNOSIS — E11.22 TYPE 2 DIABETES MELLITUS WITH DIABETIC CHRONIC KIDNEY DISEASE, UNSPECIFIED CKD STAGE, UNSPECIFIED WHETHER LONG TERM INSULIN USE: Chronic | ICD-10-CM

## 2019-01-11 DIAGNOSIS — H40.1123 PRIMARY OPEN ANGLE GLAUCOMA (POAG) OF LEFT EYE, SEVERE STAGE: ICD-10-CM

## 2019-01-11 DIAGNOSIS — H40.1112 PRIMARY OPEN ANGLE GLAUCOMA (POAG) OF RIGHT EYE, MODERATE STAGE: ICD-10-CM

## 2019-01-11 DIAGNOSIS — I10 HYPERTENSION, ESSENTIAL: ICD-10-CM

## 2019-01-11 PROCEDURE — 99999 PR PBB SHADOW E&M-EST. PATIENT-LVL IV: ICD-10-PCS | Mod: PBBFAC,,, | Performed by: NURSE PRACTITIONER

## 2019-01-11 PROCEDURE — G0439 PR MEDICARE ANNUAL WELLNESS SUBSEQUENT VISIT: ICD-10-PCS | Mod: S$GLB,,, | Performed by: NURSE PRACTITIONER

## 2019-01-11 PROCEDURE — 99214 OFFICE O/P EST MOD 30 MIN: CPT | Mod: PBBFAC | Performed by: NURSE PRACTITIONER

## 2019-01-11 PROCEDURE — 99999 PR PBB SHADOW E&M-EST. PATIENT-LVL IV: CPT | Mod: PBBFAC,,, | Performed by: NURSE PRACTITIONER

## 2019-01-11 PROCEDURE — G0439 PPPS, SUBSEQ VISIT: HCPCS | Mod: S$GLB,,, | Performed by: NURSE PRACTITIONER

## 2019-01-11 NOTE — PATIENT INSTRUCTIONS
Counseling and Referral of Other Preventative  (Italic type indicates deductible and co-insurance are waived)    Patient Name: Jamarcus Mcqueen  Today's Date: 1/11/2019    Health Maintenance       Date Due Completion Date    TETANUS VACCINE 06/14/1968 ---    Influenza Vaccine 08/01/2018 12/1/2016    Hemoglobin A1c 09/22/2018 3/22/2018    Override on 3/22/2018: Done    Foot Exam 03/22/2019 3/22/2018    Override on 3/22/2018: Done    Lipid Panel 03/22/2019 3/22/2018    Low Dose Statin 12/28/2019 12/28/2018    Eye Exam 12/28/2019 12/28/2018    Colonoscopy 04/01/2020 4/1/2016        No orders of the defined types were placed in this encounter.    The following information is provided to all patients.  This information is to help you find resources for any of the problems found today that may be affecting your health:                Living healthy guide: www.[facility].[place].[facility]      Understanding Diabetes: www.diabetes.org      Eating healthy: www.cdc.gov/healthyweight      CDC home safety checklist: www.cdc.gov/steadi/patient.html      Agency on Aging: www.goea.[place].[facility]      Alcoholics anonymous (AA): www.aa.org      Physical Activity: www.any.nih.gov/mu7dqcr      Tobacco use: www.quitwithusla.org

## 2019-01-11 NOTE — PROGRESS NOTES
"Jamarcus Mcqueen presented for a  Medicare AWV and comprehensive Health Risk Assessment today. The following components were reviewed and updated:    · Medical history  · Family History  · Social history  · Allergies and Current Medications  · Health Risk Assessment  · Health Maintenance  · Care Team     ** See Completed Assessments for Annual Wellness Visit within the encounter summary.**       The following assessments were completed:  · Living Situation  · CAGE  · Depression Screening  · Timed Get Up and Go  · Whisper Test  · Cognitive Function Screening  ·   ·   · Nutrition Screening  · ADL Screening  · PAQ Screening    Vitals:    01/11/19 1324   BP: (!) 100/59   Pulse: 71   SpO2: 97%   Weight: 102.2 kg (225 lb 5 oz)   Height: 6' 1" (1.854 m)     Body mass index is 29.73 kg/m².  Physical Exam   Constitutional: He is oriented to person, place, and time. He appears well-developed and well-nourished.   HENT:   Head: Normocephalic and atraumatic.   Nose: Nose normal.   Eyes: Conjunctivae and EOM are normal.   Cardiovascular: Normal rate, regular rhythm, normal heart sounds and intact distal pulses.   Pulmonary/Chest: Effort normal and breath sounds normal.   Musculoskeletal: Normal range of motion.   Neurological: He is alert and oriented to person, place, and time.   Skin: Skin is warm and dry.   Psychiatric: He has a normal mood and affect. His behavior is normal. Judgment and thought content normal.   Nursing note and vitals reviewed.        Diagnoses and health risks identified today and associated recommendations/orders:    1. Encounter for preventive health examination  Assessment performed. Health maintenance updated. Chart review completed.  Declined flu shot today.  Tetanus vaccine is recommended.  Labs scheduled.    2. Controlled type 2 diabetes mellitus with left eye affected by proliferative retinopathy without macular edema, with long-term current use of insulin  Diabetes controlled. Stable with current " regimen. Followed by Ophthalmology.    3. Controlled type 2 diabetes mellitus with right eye affected by moderate nonproliferative retinopathy without macular edema, with long-term current use of insulin  Diabetes controlled. Stable with current regimen. Followed by Ophthalmology.    4. Moderate nonproliferative diabetic retinopathy of both eyes without macular edema associated with type 2 diabetes mellitus  Diabetes controlled. Stable with current regimen. Followed by Ophthalmology.    5. Proliferative diabetic retinopathy of left eye without macular edema associated with type 2 diabetes mellitus  Diabetes controlled. Stable with current regimen. Followed by Ophthalmology.    6. Type 2 diabetes mellitus with diabetic chronic kidney disease, unspecified CKD stage, unspecified whether long term insulin use  Component      Latest Ref Rng & Units 3/22/2018   Hemoglobin A1C External      4.0 - 5.6 % 6.5 (H)   Estimated Avg Glucose      68 - 131 mg/dL 140 (H)     Component      Latest Ref Rng & Units 3/22/2018             Creatinine      0.5 - 1.4 mg/dL 1.4       7. CKD (chronic kidney disease) stage 3, GFR 30-59 ml/min  Chronic. Continue current regimen. Followed by Nephrology.  Component      Latest Ref Rng & Units 3/22/2018             Creatinine      0.5 - 1.4 mg/dL 1.4       8. Primary open angle glaucoma (POAG) of left eye, severe stage  Chronic. Continue current regimen. Followed by Ophthalmology.    9. Primary open angle glaucoma (POAG) of right eye, moderate stage  Chronic. Continue current regimen. Followed by Ophthalmology.    10. Hyperlipidemia, unspecified hyperlipidemia type  Chronic. Stable with current regimen.   Followed by PCP.  Component      Latest Ref Rng & Units 3/22/2018   Cholesterol      120 - 199 mg/dL 191   Triglycerides      30 - 150 mg/dL 56   HDL      40 - 75 mg/dL 75   LDL Cholesterol      63.0 - 159.0 mg/dL 104.8   HDL/Chol Ratio      20.0 - 50.0 % 39.3   Total Cholesterol/HDL Ratio       2.0 - 5.0 2.5   Non-HDL Cholesterol      mg/dL 116     11. Hypertension, essential  Chronic. Stable with current regimen.   Followed by PCP.        Provided Teller with a 5-10 year written screening schedule and personal prevention plan. Recommendations were developed using the USPSTF age appropriate recommendations. Education, counseling, and referrals were provided as needed. After Visit Summary printed and given to patient which includes a list of additional screenings\tests needed.    Follow-up for Annual Wellness Visit in 1 year, follow up with Primary Care Provider as instructed, ;sooner if prob.    ISRAEL Billingsley

## 2019-01-25 ENCOUNTER — LAB VISIT (OUTPATIENT)
Dept: LAB | Facility: HOSPITAL | Age: 69
End: 2019-01-25
Attending: INTERNAL MEDICINE
Payer: MEDICARE

## 2019-01-25 DIAGNOSIS — E11.9 TYPE 2 DIABETES MELLITUS WITHOUT COMPLICATION: ICD-10-CM

## 2019-01-25 DIAGNOSIS — E11.22 CONTROLLED TYPE 2 DIABETES MELLITUS WITH STAGE 3 CHRONIC KIDNEY DISEASE, WITH LONG-TERM CURRENT USE OF INSULIN: ICD-10-CM

## 2019-01-25 DIAGNOSIS — N18.30 CONTROLLED TYPE 2 DIABETES MELLITUS WITH STAGE 3 CHRONIC KIDNEY DISEASE, WITH LONG-TERM CURRENT USE OF INSULIN: ICD-10-CM

## 2019-01-25 DIAGNOSIS — E55.9 VITAMIN D DEFICIENCY: ICD-10-CM

## 2019-01-25 DIAGNOSIS — Z79.4 CONTROLLED TYPE 2 DIABETES MELLITUS WITH STAGE 3 CHRONIC KIDNEY DISEASE, WITH LONG-TERM CURRENT USE OF INSULIN: ICD-10-CM

## 2019-01-25 DIAGNOSIS — N18.2 CHRONIC KIDNEY DISEASE, STAGE II (MILD): ICD-10-CM

## 2019-01-25 LAB
25(OH)D3+25(OH)D2 SERPL-MCNC: 39 NG/ML
ALBUMIN SERPL BCP-MCNC: 3.7 G/DL
ANION GAP SERPL CALC-SCNC: 8 MMOL/L
BASOPHILS # BLD AUTO: 0.04 K/UL
BASOPHILS NFR BLD: 0.7 %
BUN SERPL-MCNC: 12 MG/DL
CALCIUM SERPL-MCNC: 10.4 MG/DL
CHLORIDE SERPL-SCNC: 102 MMOL/L
CO2 SERPL-SCNC: 31 MMOL/L
CREAT SERPL-MCNC: 1.4 MG/DL
DIFFERENTIAL METHOD: ABNORMAL
EOSINOPHIL # BLD AUTO: 0.3 K/UL
EOSINOPHIL NFR BLD: 5.1 %
ERYTHROCYTE [DISTWIDTH] IN BLOOD BY AUTOMATED COUNT: 12 %
EST. GFR  (AFRICAN AMERICAN): 59.7 ML/MIN/1.73 M^2
EST. GFR  (NON AFRICAN AMERICAN): 51.6 ML/MIN/1.73 M^2
ESTIMATED AVG GLUCOSE: 174 MG/DL
ESTIMATED AVG GLUCOSE: 174 MG/DL
GLUCOSE SERPL-MCNC: 123 MG/DL
HBA1C MFR BLD HPLC: 7.7 %
HBA1C MFR BLD HPLC: 7.7 %
HCT VFR BLD AUTO: 48.4 %
HGB BLD-MCNC: 15.4 G/DL
IMM GRANULOCYTES # BLD AUTO: 0.01 K/UL
IMM GRANULOCYTES NFR BLD AUTO: 0.2 %
LYMPHOCYTES # BLD AUTO: 2.1 K/UL
LYMPHOCYTES NFR BLD: 35.1 %
MCH RBC QN AUTO: 26.7 PG
MCHC RBC AUTO-ENTMCNC: 31.8 G/DL
MCV RBC AUTO: 84 FL
MONOCYTES # BLD AUTO: 0.5 K/UL
MONOCYTES NFR BLD: 8.7 %
NEUTROPHILS # BLD AUTO: 3 K/UL
NEUTROPHILS NFR BLD: 50.2 %
NRBC BLD-RTO: 0 /100 WBC
PHOSPHATE SERPL-MCNC: 3.6 MG/DL
PLATELET # BLD AUTO: 223 K/UL
PMV BLD AUTO: 9 FL
POTASSIUM SERPL-SCNC: 4.3 MMOL/L
PTH-INTACT SERPL-MCNC: 35 PG/ML
RBC # BLD AUTO: 5.76 M/UL
SODIUM SERPL-SCNC: 141 MMOL/L
WBC # BLD AUTO: 5.87 K/UL

## 2019-01-25 PROCEDURE — 36415 COLL VENOUS BLD VENIPUNCTURE: CPT

## 2019-01-25 PROCEDURE — 83036 HEMOGLOBIN GLYCOSYLATED A1C: CPT

## 2019-01-25 PROCEDURE — 83970 ASSAY OF PARATHORMONE: CPT

## 2019-01-25 PROCEDURE — 82306 VITAMIN D 25 HYDROXY: CPT

## 2019-01-25 PROCEDURE — 85025 COMPLETE CBC W/AUTO DIFF WBC: CPT

## 2019-01-25 PROCEDURE — 80069 RENAL FUNCTION PANEL: CPT

## 2019-02-01 ENCOUNTER — OFFICE VISIT (OUTPATIENT)
Dept: INTERNAL MEDICINE | Facility: CLINIC | Age: 69
End: 2019-02-01
Attending: FAMILY MEDICINE
Payer: MEDICARE

## 2019-02-01 ENCOUNTER — IMMUNIZATION (OUTPATIENT)
Dept: INTERNAL MEDICINE | Facility: CLINIC | Age: 69
End: 2019-02-01
Payer: MEDICARE

## 2019-02-01 VITALS
SYSTOLIC BLOOD PRESSURE: 110 MMHG | OXYGEN SATURATION: 99 % | TEMPERATURE: 98 F | HEIGHT: 71 IN | BODY MASS INDEX: 31.93 KG/M2 | WEIGHT: 228.06 LBS | DIASTOLIC BLOOD PRESSURE: 60 MMHG | HEART RATE: 79 BPM

## 2019-02-01 DIAGNOSIS — E78.5 HYPERLIPIDEMIA, UNSPECIFIED HYPERLIPIDEMIA TYPE: ICD-10-CM

## 2019-02-01 DIAGNOSIS — E11.42 TYPE 2 DIABETES MELLITUS WITH DIABETIC POLYNEUROPATHY, WITH LONG-TERM CURRENT USE OF INSULIN: Chronic | ICD-10-CM

## 2019-02-01 DIAGNOSIS — E11.22 TYPE 2 DIABETES MELLITUS WITH CHRONIC KIDNEY DISEASE, WITH LONG-TERM CURRENT USE OF INSULIN, UNSPECIFIED CKD STAGE: Primary | Chronic | ICD-10-CM

## 2019-02-01 DIAGNOSIS — N18.30 CKD (CHRONIC KIDNEY DISEASE) STAGE 3, GFR 30-59 ML/MIN: ICD-10-CM

## 2019-02-01 DIAGNOSIS — I10 HYPERTENSION, ESSENTIAL: ICD-10-CM

## 2019-02-01 DIAGNOSIS — Z12.5 PROSTATE CANCER SCREENING: ICD-10-CM

## 2019-02-01 DIAGNOSIS — G57.10 MERALGIA PARESTHETICA, UNSPECIFIED LATERALITY: ICD-10-CM

## 2019-02-01 DIAGNOSIS — Z79.4 TYPE 2 DIABETES MELLITUS WITH CHRONIC KIDNEY DISEASE, WITH LONG-TERM CURRENT USE OF INSULIN, UNSPECIFIED CKD STAGE: Primary | Chronic | ICD-10-CM

## 2019-02-01 DIAGNOSIS — Z79.4 TYPE 2 DIABETES MELLITUS WITH DIABETIC POLYNEUROPATHY, WITH LONG-TERM CURRENT USE OF INSULIN: Chronic | ICD-10-CM

## 2019-02-01 PROCEDURE — 99999 PR PBB SHADOW E&M-EST. PATIENT-LVL III: ICD-10-PCS | Mod: PBBFAC,,, | Performed by: FAMILY MEDICINE

## 2019-02-01 PROCEDURE — 99214 OFFICE O/P EST MOD 30 MIN: CPT | Mod: S$PBB,,, | Performed by: FAMILY MEDICINE

## 2019-02-01 PROCEDURE — 99214 PR OFFICE/OUTPT VISIT, EST, LEVL IV, 30-39 MIN: ICD-10-PCS | Mod: S$PBB,,, | Performed by: FAMILY MEDICINE

## 2019-02-01 PROCEDURE — 99213 OFFICE O/P EST LOW 20 MIN: CPT | Mod: PBBFAC,25 | Performed by: FAMILY MEDICINE

## 2019-02-01 PROCEDURE — 90662 IIV NO PRSV INCREASED AG IM: CPT | Mod: PBBFAC

## 2019-02-01 PROCEDURE — 99999 PR PBB SHADOW E&M-EST. PATIENT-LVL III: CPT | Mod: PBBFAC,,, | Performed by: FAMILY MEDICINE

## 2019-02-01 RX ORDER — TRIAMCINOLONE ACETONIDE 1 MG/G
CREAM TOPICAL 2 TIMES DAILY
Qty: 80 G | Refills: 1 | Status: SHIPPED | OUTPATIENT
Start: 2019-02-01 | End: 2021-07-23 | Stop reason: SDUPTHER

## 2019-02-01 RX ORDER — TRAZODONE HYDROCHLORIDE 50 MG/1
50 TABLET ORAL NIGHTLY PRN
Qty: 90 TABLET | Refills: 1 | Status: SHIPPED | OUTPATIENT
Start: 2019-02-01 | End: 2019-07-10 | Stop reason: SDUPTHER

## 2019-02-01 RX ORDER — VARDENAFIL HYDROCHLORIDE 20 MG/1
20 TABLET ORAL DAILY PRN
Qty: 24 TABLET | Refills: 1 | Status: SHIPPED | OUTPATIENT
Start: 2019-02-01 | End: 2020-05-14 | Stop reason: SDUPTHER

## 2019-02-01 NOTE — PROGRESS NOTES
Subjective:       Patient ID: Jamarcus Mcqueen is a 68 y.o. male.    Chief Complaint: Follow-up    HPI  Review of Systems   Constitutional: Negative for chills, fatigue, fever and unexpected weight change.   HENT: Negative for congestion and trouble swallowing.    Eyes: Negative for redness and visual disturbance.   Respiratory: Negative for cough, chest tightness and shortness of breath.    Cardiovascular: Negative for chest pain, palpitations and leg swelling.   Gastrointestinal: Negative for abdominal pain and blood in stool.   Genitourinary: Positive for frequency. Negative for difficulty urinating and hematuria.   Musculoskeletal: Negative for arthralgias, back pain, gait problem, joint swelling, myalgias and neck pain.   Skin: Negative for color change and rash.   Neurological: Positive for numbness. Negative for tremors, speech difficulty, weakness and headaches.   Hematological: Negative for adenopathy. Does not bruise/bleed easily.   Psychiatric/Behavioral: Positive for sleep disturbance. Negative for behavioral problems and confusion. The patient is not nervous/anxious.        Objective:      Physical Exam   Constitutional: He appears well-developed and well-nourished.   HENT:   Head: Normocephalic.   Right Ear: Tympanic membrane, external ear and ear canal normal.   Left Ear: Tympanic membrane, external ear and ear canal normal.   Mouth/Throat: Oropharynx is clear and moist.   Eyes: Pupils are equal, round, and reactive to light.   Neck: Normal range of motion. Neck supple. Carotid bruit is not present. No thyromegaly present.   Cardiovascular: Normal rate, regular rhythm, normal heart sounds and intact distal pulses. Exam reveals no gallop and no friction rub.   No murmur heard.  Pulmonary/Chest: Effort normal and breath sounds normal.   Abdominal: Soft. He exhibits no distension and no mass. There is no tenderness. There is no rebound and no guarding.   Musculoskeletal: Normal range of motion. He exhibits  no edema or tenderness.   Lymphadenopathy:     He has no cervical adenopathy.   Neurological: He is alert. He has normal strength. He displays normal reflexes. No cranial nerve deficit or sensory deficit. Coordination and gait normal.   Skin: Skin is warm and dry. Rash noted.   Psychiatric: He has a normal mood and affect. His behavior is normal. Judgment and thought content normal.   Nursing note and vitals reviewed.      Assessment:       1. Type 2 diabetes mellitus with chronic kidney disease, with long-term current use of insulin, unspecified CKD stage    2. Type 2 diabetes mellitus with diabetic polyneuropathy, with long-term current use of insulin    3. Hypertension, essential    4. Hyperlipidemia, unspecified hyperlipidemia type    5. Meralgia paresthetica, unspecified laterality    6. CKD (chronic kidney disease) stage 3, GFR 30-59 ml/min    7. Prostate cancer screening        Plan:   Jamarcus was seen today for follow-up.    Diagnoses and all orders for this visit:    Type 2 diabetes mellitus with chronic kidney disease, with long-term current use of insulin, unspecified CKD stage    Type 2 diabetes mellitus with diabetic polyneuropathy, with long-term current use of insulin    Hypertension, essential    Hyperlipidemia, unspecified hyperlipidemia type  -     Lipid panel; Standing    Meralgia paresthetica, unspecified laterality    CKD (chronic kidney disease) stage 3, GFR 30-59 ml/min    Prostate cancer screening  -     PSA, Screening; Standing    Other orders  -     vardenafil (LEVITRA) 20 MG tablet; Take 1 tablet (20 mg total) by mouth daily as needed for Erectile Dysfunction.  -     traZODone (DESYREL) 50 MG tablet; Take 1 tablet (50 mg total) by mouth nightly as needed for Insomnia.  -     triamcinolone acetonide 0.1% (KENALOG) 0.1 % cream; Apply topically 2 (two) times daily.      See meds, orders, follow up, routing and instructions sections of encounter.  A 68-year-old gentleman.  He is in for an  annual physical examination.  He was   in [place], [place].  He has no particular complaints at this time.  His last   A1c was 7.7.  He uses 25 units in the morning and occasionally misses insulin   doses.  His health maintenance tab was reviewed.  Appropriate care gap suggest.    He will follow up with me presumably in 6-12 months with laboratory.  Continue   exercise and diet      CIARA/SULEMA  dd: 02/01/2019 16:24:28 (CST)  td: 02/02/2019 09:39:22 (CST)  Doc ID   #[id number]  Job ID #[id number]    CC:

## 2019-02-01 NOTE — PATIENT INSTRUCTIONS
Flu shot today    Information about cholesterol, high blood pressure and healthy diet and activity recommendations can be found at the following links on the Internet:    http://www.nhlbi.nih.gov/health/health-topics/topics/hbc  http://www.nhlbi.nih.gov/health/educational/lose_wt/index.htm  Http://www.nhlbi.nih.gov/files/docs/public/heart/hbp_low.pdf  http://www.heart.org/HEARTORG/  http://diabetes.org/  https://www.cdc.gov/  Https://healthfinder.gov/

## 2019-03-04 RX ORDER — SIMVASTATIN 40 MG/1
TABLET, FILM COATED ORAL
Qty: 90 TABLET | Refills: 0 | Status: SHIPPED | OUTPATIENT
Start: 2019-03-04 | End: 2019-06-02 | Stop reason: SDUPTHER

## 2019-03-05 RX ORDER — EZETIMIBE 10 MG/1
TABLET ORAL
Qty: 90 TABLET | Refills: 0 | Status: SHIPPED | OUTPATIENT
Start: 2019-03-05 | End: 2019-06-02 | Stop reason: SDUPTHER

## 2019-03-18 RX ORDER — LOSARTAN POTASSIUM 25 MG/1
TABLET ORAL
Qty: 90 TABLET | Refills: 0 | Status: SHIPPED | OUTPATIENT
Start: 2019-03-18 | End: 2019-06-16 | Stop reason: SDUPTHER

## 2019-06-07 RX ORDER — SIMVASTATIN 40 MG/1
TABLET, FILM COATED ORAL
Qty: 90 TABLET | Refills: 0 | Status: SHIPPED | OUTPATIENT
Start: 2019-06-07 | End: 2019-08-30 | Stop reason: SDUPTHER

## 2019-06-07 RX ORDER — EZETIMIBE 10 MG/1
TABLET ORAL
Qty: 90 TABLET | Refills: 0 | Status: SHIPPED | OUTPATIENT
Start: 2019-06-07 | End: 2019-09-01 | Stop reason: SDUPTHER

## 2019-06-16 RX ORDER — LOSARTAN POTASSIUM 25 MG/1
TABLET ORAL
Qty: 90 TABLET | Refills: 0 | Status: SHIPPED | OUTPATIENT
Start: 2019-06-16 | End: 2019-09-13 | Stop reason: SDUPTHER

## 2019-07-10 RX ORDER — TRAZODONE HYDROCHLORIDE 50 MG/1
TABLET ORAL
Qty: 90 TABLET | Refills: 0 | Status: SHIPPED | OUTPATIENT
Start: 2019-07-10 | End: 2021-07-23

## 2019-08-31 RX ORDER — SIMVASTATIN 40 MG/1
TABLET, FILM COATED ORAL
Qty: 90 TABLET | Refills: 0 | Status: SHIPPED | OUTPATIENT
Start: 2019-08-31 | End: 2019-11-28 | Stop reason: SDUPTHER

## 2019-09-04 RX ORDER — EZETIMIBE 10 MG/1
TABLET ORAL
Qty: 90 TABLET | Refills: 0 | Status: SHIPPED | OUTPATIENT
Start: 2019-09-04 | End: 2019-12-02 | Stop reason: SDUPTHER

## 2019-09-14 RX ORDER — LOSARTAN POTASSIUM 25 MG/1
TABLET ORAL
Qty: 90 TABLET | Refills: 0 | Status: SHIPPED | OUTPATIENT
Start: 2019-09-14 | End: 2019-12-13 | Stop reason: SDUPTHER

## 2019-10-04 DIAGNOSIS — E11.9 TYPE 2 DIABETES MELLITUS WITHOUT COMPLICATION: ICD-10-CM

## 2019-10-29 ENCOUNTER — PATIENT OUTREACH (OUTPATIENT)
Dept: ADMINISTRATIVE | Facility: OTHER | Age: 69
End: 2019-10-29

## 2019-11-01 ENCOUNTER — OFFICE VISIT (OUTPATIENT)
Dept: NEPHROLOGY | Facility: CLINIC | Age: 69
End: 2019-11-01
Payer: MEDICARE

## 2019-11-01 VITALS
DIASTOLIC BLOOD PRESSURE: 80 MMHG | HEART RATE: 65 BPM | OXYGEN SATURATION: 98 % | WEIGHT: 227.06 LBS | BODY MASS INDEX: 31.79 KG/M2 | SYSTOLIC BLOOD PRESSURE: 110 MMHG | HEIGHT: 71 IN

## 2019-11-01 DIAGNOSIS — N18.30 CONTROLLED TYPE 2 DIABETES MELLITUS WITH STAGE 3 CHRONIC KIDNEY DISEASE, WITH LONG-TERM CURRENT USE OF INSULIN: Primary | ICD-10-CM

## 2019-11-01 DIAGNOSIS — Z79.4 CONTROLLED TYPE 2 DIABETES MELLITUS WITH STAGE 3 CHRONIC KIDNEY DISEASE, WITH LONG-TERM CURRENT USE OF INSULIN: Primary | ICD-10-CM

## 2019-11-01 DIAGNOSIS — E11.22 CONTROLLED TYPE 2 DIABETES MELLITUS WITH STAGE 3 CHRONIC KIDNEY DISEASE, WITH LONG-TERM CURRENT USE OF INSULIN: Primary | ICD-10-CM

## 2019-11-01 PROCEDURE — 99213 OFFICE O/P EST LOW 20 MIN: CPT | Mod: S$PBB,,, | Performed by: INTERNAL MEDICINE

## 2019-11-01 PROCEDURE — 99213 OFFICE O/P EST LOW 20 MIN: CPT | Mod: PBBFAC | Performed by: INTERNAL MEDICINE

## 2019-11-01 PROCEDURE — 99213 PR OFFICE/OUTPT VISIT, EST, LEVL III, 20-29 MIN: ICD-10-PCS | Mod: S$PBB,,, | Performed by: INTERNAL MEDICINE

## 2019-11-01 PROCEDURE — 99999 PR PBB SHADOW E&M-EST. PATIENT-LVL III: CPT | Mod: PBBFAC,,, | Performed by: INTERNAL MEDICINE

## 2019-11-01 PROCEDURE — 99999 PR PBB SHADOW E&M-EST. PATIENT-LVL III: ICD-10-PCS | Mod: PBBFAC,,, | Performed by: INTERNAL MEDICINE

## 2019-11-09 NOTE — PROGRESS NOTES
Subjective:       Patient ID: Jamarcus Mcqueen is a 69 y.o. Black or  male who presents for follow-up evaluation of Chronic Kidney Disease    HPI     Mr. Mcqueen is a 69 year old man with past medical history of diabetes, hypertension presenting for follow up of chronic kidney disease.  Patient reports blood sugars, blood pressure well-controlled.  He reports adequate fluid intake, denies any NSAID use.  He otherwise denies any fever, chest pain, shortness of breath, abdominal pain, diarrhea, dysuria/hematuria.     Review of Systems   Constitutional: Negative for appetite change, fatigue and fever.   Respiratory: Negative for cough and shortness of breath.    Cardiovascular: Negative for chest pain and leg swelling.   Gastrointestinal: Negative for abdominal pain, constipation, diarrhea, nausea and vomiting.   Genitourinary: Negative for dysuria, flank pain, frequency, hematuria and urgency.   Musculoskeletal: Negative for arthralgias, back pain and joint swelling.   Skin: Negative for rash.   Neurological: Negative for dizziness and light-headedness.   All other systems reviewed and are negative.      Objective:      Physical Exam   Constitutional: He appears well-developed and well-nourished.   Cardiovascular: Normal rate and regular rhythm. Exam reveals no gallop and no friction rub.   No murmur heard.  Pulmonary/Chest: Effort normal and breath sounds normal. No respiratory distress. He has no wheezes. He has no rales.   Musculoskeletal: He exhibits no edema.   Neurological: He is alert.   Skin: Skin is warm and dry. No rash noted.   Vitals reviewed.      Assessment:       1. Controlled type 2 diabetes mellitus with stage 3 chronic kidney disease, with long-term current use of insulin        Plan:      Mr. Mcqueen is a 69 year old man with past medical history of diabetes, hypertension presenting for follow up of chronic kidney disease.  Patient creatinine 1.4-1.6mg/dL for the past several years,  consistent with CKD stage II/III.  Creatinine relatively stable, proteinuria negligible on ARB, will continue to trend labs.  Stressed importance of blood pressure/glycemic control to prevent any further progression of kidney disease, patient voiced understanding.   - Anemia: Hgb at goal, no indication for erythropoetin therapy  - Bone/mineral metabolism: will trend PTH/VitD    Return to clinic in 6-8 months pending renal panel, then with renal/heme panel, iron/TIBC/ferritin, urinalysis/culture, urine protein/creatinine ratio, PTH/VitD prior to next visit

## 2019-11-28 ENCOUNTER — TELEPHONE (OUTPATIENT)
Dept: INTERNAL MEDICINE | Facility: CLINIC | Age: 69
End: 2019-11-28

## 2019-11-28 DIAGNOSIS — E78.5 HYPERLIPIDEMIA, UNSPECIFIED HYPERLIPIDEMIA TYPE: Primary | ICD-10-CM

## 2019-12-02 RX ORDER — SIMVASTATIN 40 MG/1
TABLET, FILM COATED ORAL
Qty: 90 TABLET | Refills: 0 | Status: SHIPPED | OUTPATIENT
Start: 2019-12-02 | End: 2020-02-27

## 2019-12-02 NOTE — TELEPHONE ENCOUNTER
Please schedule patient for labs     (CMP)    Please also check with your provider if any further labs need to be added and scheduled together.     Thanks

## 2019-12-02 NOTE — TELEPHONE ENCOUNTER
Patient is due for a follow up appointment - please call patient to schedule appointment. Also, please review lab order section and schedule any standing or future labs before appointment if applicable. Thank you.    See standing or future lab orders and please draw CMP - soon, thank you.

## 2019-12-02 NOTE — TELEPHONE ENCOUNTER
Refill Authorization Note     is requesting a refill authorization.    Brief assessment and rationale for refill: APPROVE: needs labs           Medication Therapy Plan: ALT/AST outdated; NTBO(cmp)                             Last Lipids 12 months   Lab Results   Component Value Date    CHOL 181 02/01/2019    CHOL 191 03/22/2018    CHOL 186 08/03/2017    Lab Results   Component Value Date    HDL 77 (H) 02/01/2019    HDL 75 03/22/2018    HDL 71 08/03/2017      Lab Results   Component Value Date    TRIG 46 02/01/2019    TRIG 56 03/22/2018    TRIG 70 08/03/2017    Lab Results   Component Value Date    LDLCALC 94.8 02/01/2019    LDLCALC 104.8 03/22/2018    LDLCALC 101.0 08/03/2017       Lab Results   Component Value Date    CHOLHDL 42.5 02/01/2019    CHOLHDL 39.3 03/22/2018    CHOLHDL 38.2 08/03/2017        Last LFTs 12 months   Lab Results   Component Value Date    ALT 27 03/22/2018    ALT 16 12/01/2016    ALT 27 09/24/2015    Lab Results   Component Value Date    AST 31 03/22/2018    AST 23 12/01/2016    AST 28 09/24/2015      Lab Results   Component Value Date    BILITOT 1.0 03/22/2018    BILITOT 1.3 (H) 12/01/2016    BILITOT 1.0 09/24/2015    LFT/TOTBILI-wnl, or within 3x the UNL     Appointments     Date Provider   Last Visit   2/1/2019 Kt Stacy MD   Next Visit   Visit date not found Kt Stacy MD

## 2019-12-05 ENCOUNTER — DOCUMENTATION ONLY (OUTPATIENT)
Dept: INTERNAL MEDICINE | Facility: CLINIC | Age: 69
End: 2019-12-05

## 2019-12-05 RX ORDER — EZETIMIBE 10 MG/1
TABLET ORAL
Qty: 90 TABLET | Refills: 0 | Status: SHIPPED | OUTPATIENT
Start: 2019-12-05 | End: 2020-03-03

## 2019-12-05 NOTE — PROGRESS NOTES
Refill Authorization Note     is requesting a refill authorization.    Brief assessment and rationale for refill: APPROVE: prr  Name and strength of medication: ezetimibe (ZETIA) 10 mg tablet       Medication Therapy Plan: FOVS. FLOS(CMP)    Medication reconciliation completed: No                         Comments:   Requested Prescriptions   Pending Prescriptions Disp Refills    ezetimibe (ZETIA) 10 mg tablet [Pharmacy Med Name: EZETIMIBE TABS 10MG] 90 tablet 0     Sig: TAKE 1 TABLET DAILY       Cardiovascular:  Antilipid - Sterol Transport Inhibitors Passed - 12/5/2019 10:43 AM        Passed - Patient is at least 18 years old        Passed - Office visit in past 12 months or future 90 days     Recent Outpatient Visits            1 month ago Controlled type 2 diabetes mellitus with stage 3 chronic kidney disease, with long-term current use of insulin    [facility] - Nephrology Manuel Browning MD    10 months ago Type 2 diabetes mellitus with chronic kidney disease, with long-term current use of insulin, unspecified CKD stage    [facility] - Internal Medicine Kt Stacy MD    10 months ago Encounter for preventive health examination    [facility] - Internal Medicine ISRAEL Mancilla    11 months ago Chronic kidney disease, stage II (mild)    [facility] - Nephrology Manuel Browning MD    11 months ago Primary open angle glaucoma of right eye, moderate stage    [facility] - Ophthalmology Mandie Mueller MD          Future Appointments              In 4 weeks LAB, SAME DAY [facility]    In 4 weeks MD [facility] - Internal Medicine, Jori raquel PCW                   Last Lipids 12 months   Lab Results   Component Value Date    CHOL 181 02/01/2019    CHOL 191 03/22/2018    CHOL 186 08/03/2017    Lab Results   Component Value Date    HDL 77 (H) 02/01/2019    HDL 75 03/22/2018    HDL 71 08/03/2017      Lab Results   Component Value Date     TRIG 46 02/01/2019    TRIG 56 03/22/2018    TRIG 70 08/03/2017    Lab Results   Component Value Date    LDLCALC 94.8 02/01/2019    LDLCALC 104.8 03/22/2018    LDLCALC 101.0 08/03/2017       Lab Results   Component Value Date    CHOLHDL 42.5 02/01/2019    CHOLHDL 39.3 03/22/2018    CHOLHDL 38.2 08/03/2017        Last LFTs 12 months   Lab Results   Component Value Date    ALT 27 03/22/2018    ALT 16 12/01/2016    ALT 27 09/24/2015    Lab Results   Component Value Date    AST 31 03/22/2018    AST 23 12/01/2016    AST 28 09/24/2015      Lab Results   Component Value Date    BILITOT 1.0 03/22/2018    BILITOT 1.3 (H) 12/01/2016    BILITOT 1.0 09/24/2015    LFT/TOTBILI-wnl, or within 3x the UNL       Appointments  past 18m or future 3m with PCP    Date Provider   Last Visit   2/1/2019 Kt Stacy MD   Next Visit   1/3/2020 Kt Stacy MD

## 2019-12-09 RX ORDER — INSULIN GLARGINE 100 [IU]/ML
INJECTION, SOLUTION SUBCUTANEOUS
Qty: 30 ML | Refills: 0 | Status: SHIPPED | OUTPATIENT
Start: 2019-12-09 | End: 2020-04-13 | Stop reason: SDUPTHER

## 2019-12-09 NOTE — PROGRESS NOTES
Refill Authorization Note     is requesting a refill authorization.    Brief assessment and rationale for refill: REVIEW: abnormal vitals          Medication Therapy Plan: SCr>1.4                              Comments:   Requested Prescriptions   Pending Prescriptions Disp Refills    LANTUS SOLOSTAR U-100 INSULIN glargine 100 units/mL (3mL) SubQ pen [Pharmacy Med Name: LANTUS SOLOSTAR PEN 100U/ML] 30 mL 0     Sig: INJECT 30 UNITS UNDER THE SKIN EVERY EVENING       Endocrinology:  Diabetes - Insulins Failed - 12/6/2019 11:57 PM        Failed - Cr is 1.4 or below and within 360 days     Creatinine   Date Value Ref Range Status   11/01/2019 1.7 (H) 0.5 - 1.4 mg/dL Final   01/25/2019 1.4 0.5 - 1.4 mg/dL Final   03/22/2018 1.4 0.5 - 1.4 mg/dL Final              Passed - Patient is at least 18 years old        Passed - Office visit in past 12 months or future 90 days     Recent Outpatient Visits            1 month ago Controlled type 2 diabetes mellitus with stage 3 chronic kidney disease, with long-term current use of insulin    [facility] Manuel Browning MD    10 months ago Type 2 diabetes mellitus with chronic kidney disease, with long-term current use of insulin, unspecified CKD stage    [facility] - Internal Medicine Kt Stacy MD    11 months ago Encounter for preventive health examination    [facility] - Internal Medicine ISRAEL Mancilla    11 months ago Chronic kidney disease, stage II (mild)    [facility]rology Manuel Browning MD    11 months ago Primary open angle glaucoma of right eye, moderate stage    [facility] - Ophthalmology Mandie Mueller MD          Future Appointments              In 3 weeks LAB, [facility]    In 3 weeks MD [facility] - [facility]                Passed - HBA1C is 7.9 or below and within 180 days     Hemoglobin A1C   Date Value Ref Range Status    11/01/2019 7.1 (H) 4.0 - 5.6 % Final     Comment:     ADA Screening Guidelines:  5.7-6.4%  Consistent with prediabetes  >or=6.5%  Consistent with diabetes  High levels of fetal hemoglobin interfere with the HbA1C  assay. Heterozygous hemoglobin variants (HbS, HgC, etc)do  not significantly interfere with this assay.   However, presence of multiple variants may affect accuracy.     01/25/2019 7.7 (H) 4.0 - 5.6 % Final     Comment:     ADA Screening Guidelines:  5.7-6.4%  Consistent with prediabetes  >or=6.5%  Consistent with diabetes  High levels of fetal hemoglobin interfere with the HbA1C  assay. Heterozygous hemoglobin variants (HbS, HgC, etc)do  not significantly interfere with this assay.   However, presence of multiple variants may affect accuracy.     01/25/2019 7.7 (H) 4.0 - 5.6 % Final     Comment:     ADA Screening Guidelines:  5.7-6.4%  Consistent with prediabetes  >or=6.5%  Consistent with diabetes  High levels of fetal hemoglobin interfere with the HbA1C  assay. Heterozygous hemoglobin variants (HbS, HgC, etc)do  not significantly interfere with this assay.   However, presence of multiple variants may affect accuracy.                Passed - eGFR is 30 or above and within 360 days     eGFR if non    Date Value Ref Range Status   11/01/2019 40.5 (A) >60 mL/min/1.73 m^2 Final     Comment:     Calculation used to obtain the estimated glomerular filtration  rate (eGFR) is the CKD-EPI equation.      01/25/2019 51.6 (A) >60 mL/min/1.73 m^2 Final     Comment:     Calculation used to obtain the estimated glomerular filtration  rate (eGFR) is the CKD-EPI equation.      03/22/2018 52 (A) >60 mL/min/1.73 m^2 Final     Comment:     Calculation used to obtain the estimated glomerular filtration  rate (eGFR) is the CKD-EPI equation.

## 2019-12-09 NOTE — TELEPHONE ENCOUNTER
I have reviewed and agree with the assessment below.  CMP to be repeated; will denis 3 more.  Thanks

## 2019-12-10 RX ORDER — INSULIN LISPRO 100 [IU]/ML
INJECTION, SOLUTION INTRAVENOUS; SUBCUTANEOUS
Qty: 3 ML | Refills: 5 | Status: SHIPPED | OUTPATIENT
Start: 2019-12-10 | End: 2019-12-10 | Stop reason: SDUPTHER

## 2019-12-10 RX ORDER — INSULIN LISPRO 100 [IU]/ML
INJECTION, SOLUTION INTRAVENOUS; SUBCUTANEOUS
Qty: 10 ML | Refills: 11 | Status: SHIPPED | OUTPATIENT
Start: 2019-12-10 | End: 2019-12-11 | Stop reason: ALTCHOICE

## 2019-12-10 RX ORDER — INSULIN LISPRO 100 [IU]/ML
INJECTION, SOLUTION INTRAVENOUS; SUBCUTANEOUS
Qty: 1 ML | Refills: 0
Start: 2019-12-10 | End: 2019-12-10

## 2019-12-10 NOTE — TELEPHONE ENCOUNTER
----- Message from Siddhartha Beltran sent at 12/10/2019  8:52 AM CST -----  Contact: self/[phone]  Type: Rx    Name of medication(s): insulin lispro (HUMALOG) 100 unit/mL injection    Is this a refill? New rx? Refill     Who prescribed medication? Dr. Stacy     Pharmacy Name, Phone, & Location: [facility] - [place], [address]    Comments: Please call and advise.          Thank You

## 2019-12-11 ENCOUNTER — TELEPHONE (OUTPATIENT)
Dept: INTERNAL MEDICINE | Facility: CLINIC | Age: 69
End: 2019-12-11

## 2019-12-11 RX ORDER — INSULIN LISPRO 100 [IU]/ML
INJECTION, SOLUTION INTRAVENOUS; SUBCUTANEOUS
Qty: 3 SYRINGE | Refills: 1 | Status: SHIPPED | OUTPATIENT
Start: 2019-12-11 | End: 2020-04-10 | Stop reason: ALTCHOICE

## 2019-12-11 RX ORDER — INSULIN LISPRO 100 [IU]/ML
INJECTION, SOLUTION INTRAVENOUS; SUBCUTANEOUS
Qty: 3 SYRINGE | Refills: 1 | Status: SHIPPED | OUTPATIENT
Start: 2019-12-11 | End: 2019-12-11 | Stop reason: SDUPTHER

## 2019-12-11 NOTE — TELEPHONE ENCOUNTER
I ordered what was present in the chart. Pen needles were not in his chart, and there is no dosing provided.    Requested medication refilled and sent by electronic prescription. Please call and notify the patient. Thank you.

## 2019-12-11 NOTE — TELEPHONE ENCOUNTER
LVM to pt informing that requested medication refilled and sent by electronic prescription to the pharmacy.

## 2019-12-11 NOTE — TELEPHONE ENCOUNTER
----- Message from Melissa Arevalo sent at 12/11/2019  9:08 AM CST -----  Contact: patient [phone]  Patient request pen needles of  insulin lispro 100 unit/mL injection and always gets pen needles but you ordered vials instead. Please change this and order pens instead. It is being processed right now so this needs to be called asap.    [facility] - [address] [phone]

## 2019-12-11 NOTE — TELEPHONE ENCOUNTER
LVM to pt informing that requested medication refilled and sent by electronic prescription to the pharmacy. Advised pt to call pharmacy prior .

## 2019-12-13 DIAGNOSIS — I10 HYPERTENSION, ESSENTIAL: Primary | ICD-10-CM

## 2019-12-16 RX ORDER — LOSARTAN POTASSIUM 25 MG/1
25 TABLET ORAL DAILY
Qty: 90 TABLET | Refills: 0 | Status: SHIPPED | OUTPATIENT
Start: 2019-12-16 | End: 2020-03-21

## 2019-12-16 NOTE — TELEPHONE ENCOUNTER
I have reviewed the assessment below. Pt with CKD - follows with Nephrology. FOVS. Approve 3 more.

## 2019-12-16 NOTE — PROGRESS NOTES
Refill Authorization Note     is requesting a refill authorization.    Brief assessment and rationale for refill: REVIEW: Abnormal Labs          Medication Therapy Plan: SCr elevated; please review    Medication reconciliation completed: No   Pharmacist Review Requested: Yes                     Comments:   Requested Prescriptions   Pending Prescriptions Disp Refills    losartan (COZAAR) 25 MG tablet [Pharmacy Med Name: LOSARTAN TABS 25MG] 90 tablet 0     Sig: Take 1 tablet (25 mg total) by mouth once daily.       Cardiovascular:  Angiotensin Receptor Blockers Failed - 12/16/2019  9:57 AM        Failed - Cr is 1.4 or below and within 360 days     Creatinine   Date Value Ref Range Status   11/01/2019 1.7 (H) 0.5 - 1.4 mg/dL Final   01/25/2019 1.4 0.5 - 1.4 mg/dL Final   03/22/2018 1.4 0.5 - 1.4 mg/dL Final              Passed - Patient is at least 18 years old        Passed - Last BP in normal range within 360 days     BP Readings from Last 3 Encounters:   11/01/19 110/80   02/01/19 110/60   01/11/19 (!) 100/59              Passed - Office visit in past 12 months or future 90 days     Recent Outpatient Visits            1 month ago Controlled type 2 diabetes mellitus with stage 3 chronic kidney disease, with long-term current use of insulin    [facility] - [facility]rology Manuel Browning MD    10 months ago Type 2 diabetes mellitus with chronic kidney disease, with long-term current use of insulin, unspecified CKD stage    [facility] - Internal Medicine Kt Stacy MD    11 months ago Encounter for preventive health examination    [facility] - Internal Medicine ISRAEL Mancilla    11 months ago Chronic kidney disease, stage II (mild)    [facility] - Nephrology Manuel Browning MD    11 months ago Primary open angle glaucoma of right eye, moderate stage    [facility] - Ophthalmology Mandie Mueller MD          Future Appointments              In 2 weeks LAB, SAME DAY [facility], [facility] PC[facility]    In 2 weeks MD [facility] - Internal Medicine, [facility] PC[facility]                Passed - K in normal range and within 360 days     Potassium   Date Value Ref Range Status   11/01/2019 4.5 3.5 - 5.1 mmol/L Final   01/25/2019 4.3 3.5 - 5.1 mmol/L Final   03/22/2018 4.7 3.5 - 5.1 mmol/L Final              Passed - eGFR within 360 days     eGFR if non    Date Value Ref Range Status   11/01/2019 40.5 (A) >60 mL/min/1.73 m^2 Final     Comment:     Calculation used to obtain the estimated glomerular filtration  rate (eGFR) is the CKD-EPI equation.      01/25/2019 51.6 (A) >60 mL/min/1.73 m^2 Final     Comment:     Calculation used to obtain the estimated glomerular filtration  rate (eGFR) is the CKD-EPI equation.      03/22/2018 52 (A) >60 mL/min/1.73 m^2 Final     Comment:     Calculation used to obtain the estimated glomerular filtration  rate (eGFR) is the CKD-EPI equation.                 Appointments  past 12m or future 3m with PCP    Date Provider   Last Visit   2/1/2019 Kt Stacy MD   Next Visit   1/3/2020 Kt Stacy MD

## 2019-12-18 NOTE — TELEPHONE ENCOUNTER
----- Message from Nabila Encinas sent at 12/10/2019  2:05 PM CST -----  Contact: Patient [phone]  Prescription Request:     Name of medication: insulin lispro (HUMALOG) 100 unit/mL injection    Reason for request: Refill    Pharmacy: [facility] - [place], [place] - [address] [place] [address]    Patient request that you send a 2 week supply to the local pharmacy while he waits on the mail order [address], [place] - [address] [facility]    Please complete today if possible.  Thank You       Chief Complaint(s) and History of Present Illness(es)     Amblyopia Follow-Up     Laterality: both eyes    Associated symptoms: Negative for eye pain and blurred vision    Treatments tried: glasses    Response to treatment: significant improvement    Compliance with Treatment: always            Review of systems for the eyes was negative other than the pertinent positives and negatives noted in the HPI.  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   Myopia of both eyes with astigmatism - anisometropia persists, stable.   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 Mom had blepharoplasty for cosmesis for same reason. No surgery now; but he could consider as an adult. Baseline photos 12/18/2019.     - New glasses prescribed, full-time wear.   - graduate to optometry for ongoing eye care        Return in about 1 year (around 12/18/2020) for Dr. Kanwal Gasca.    Patient Instructions   I am happy to report that Cody Goode has excellent vision and ocular health! It has been my pleasure taking care of him. I recommend graduating Cody to our healthy eyes clinic with my partner, Dr. Kanwal Gasca. Dr. Gasca will monitor Shannons eyes, glasses and/or contact lenses prescriptions, and continue to optimize his visual development. Schedule you next visit today at the  or, in 9 months, call [phone] to schedule with Dr. Kanwal Gasca for an appointment around 12/17/20.       Visit Diagnoses & Orders    ICD-10-CM    1. Myopia of both eyes with astigmatism H52.13     H52.203    2. 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  I personally obtained the chief complaint(s) and history of present illness.  I confirmed and edited as necessary the review of systems, past medical/surgical history, family history, social history, and examination findings as documented by others; and I examined the patient myself.  I personally reviewed the relevant tests, images, and reports as documented above.  I formulated and edited as necessary the assessment and plan and discussed the findings and management plan with the patient and family. - Aly Ramos Jr., MD

## 2019-12-19 ENCOUNTER — PATIENT OUTREACH (OUTPATIENT)
Dept: ADMINISTRATIVE | Facility: HOSPITAL | Age: 69
End: 2019-12-19

## 2019-12-23 DIAGNOSIS — H40.1112 PRIMARY OPEN ANGLE GLAUCOMA OF RIGHT EYE, MODERATE STAGE: ICD-10-CM

## 2019-12-23 DIAGNOSIS — H40.1123 PRIMARY OPEN ANGLE GLAUCOMA OF LEFT EYE, SEVERE STAGE: ICD-10-CM

## 2019-12-26 RX ORDER — TIMOLOL MALEATE 5 MG/ML
SOLUTION OPHTHALMIC
Qty: 15 ML | Refills: 4 | Status: SHIPPED | OUTPATIENT
Start: 2019-12-26 | End: 2021-03-22

## 2019-12-30 DIAGNOSIS — H40.1112 PRIMARY OPEN ANGLE GLAUCOMA OF RIGHT EYE, MODERATE STAGE: ICD-10-CM

## 2019-12-30 DIAGNOSIS — H40.1123 PRIMARY OPEN ANGLE GLAUCOMA OF LEFT EYE, SEVERE STAGE: ICD-10-CM

## 2019-12-31 RX ORDER — LATANOPROST 50 UG/ML
1 SOLUTION/ DROPS OPHTHALMIC NIGHTLY
Qty: 7.5 ML | Refills: 3 | Status: SHIPPED | OUTPATIENT
Start: 2019-12-31 | End: 2021-05-06

## 2020-01-14 ENCOUNTER — PATIENT OUTREACH (OUTPATIENT)
Dept: ADMINISTRATIVE | Facility: HOSPITAL | Age: 70
End: 2020-01-14

## 2020-01-21 ENCOUNTER — PATIENT OUTREACH (OUTPATIENT)
Dept: ADMINISTRATIVE | Facility: OTHER | Age: 70
End: 2020-01-21

## 2020-01-21 NOTE — PROGRESS NOTES
HPI     DLS: 12/28/18    Pt here for HVF review;  Pt states he forgot to put in his Timolol eye drops this AM.     Meds:  T1/2 GFS QAM OU   Latanoprost QHS OU     1) POAG OS>>OD   2) PCIOL OU   3) Type 2 DM   4) BDR OD   5) PDR OS   6) VH OS       Last edited by Billie Douglas on 1/23/2020  2:34 PM. (History)            Assessment /Plan     For exam results, see Encounter Report.    Primary open angle glaucoma of right eye, moderate stage    Primary open angle glaucoma of left eye, severe stage    Controlled type 2 diabetes mellitus with left eye affected by proliferative retinopathy without macular edema, with long-term current use of insulin    Proliferative diabetic retinopathy of left eye without macular edema associated with type 2 diabetes mellitus    Pseudophakia, both eyes            1. POAG od moderate stage // POAG severe stage OS  Followed as a borderline glaucoma with ocular hypertension [age] yo 2014     First HVF   2008   First photos   2011   Treatment / Drops started   7/2014              Dx made 4/2014             Family history    + sister /  + mom also a supect - Rx with gtts for a while then taken off        Glaucoma meds    none        H/O adverse rxn to glaucoma drops    none        LASERS    none        GLAUCOMA SURGERIES    none        OTHER EYE SURGERIES    Phaco/IOL OD 9/20/2017 Phaco/IOL OS 5/18/2016 - set for near OU        CDR    0.9/0.95        Tbase    17-26 / 17-26        Tmax    26/26          Ttarget   16-17 ou             HVF    7 test 2008 to  2020 - full /nonspecific defects  od // marked gen dep / SAD / IAD - paracentral loss  os        Gonio    +3 ou (no NVI or NVA os - s/p avastin for PDR)        CCT    544/539        OCT    3 test 2013 to 2020 - RNFL - OD:dec. G/T/TI/N  // OS:dec. G/TS/T/TI, bord N        HRT    2 test ([place])  2008 to 2011 - MR - nl od // nl os                    4 test ([facility]) 2014 - 2018 - MR -  [place] off od // dec. I, bord T os         Disc  photos    2011 - OIS    - Ttoday   15/14  - Test done today    HVF / DFE / OCT      3. Myopia, astigmatism, presbyopia  Glasses  Use to use CTL's [place] [facility] [place]     4. Diabetes mellitus, type 2   + PDR os - s/p avastin #1 10/19/2014 - [place]  S/P PRP OS   + BDR - od  Sees [facility]     5. VH OS- 2/2 DR - resolved  Saw Jose Angel - 9/3/2015   6. PC IOL os - 5/18/2016 -   -PCB00 18.5 - aimed for near vision per pts request (-2.50) // complex trypan blue   PC IOL od - 9/20/2017 - 18.5 - set for near / complex trypan blue        POAG - OS severe stage  POAG- OD moderate stage  + HVF loss os > OD  + OCT - RNFL changes ou  T base 17-26 ou  + fm hx - sister and ?mom  Cont  - Latanoprost - use OU again   IOP much better with addition of timolol 21/23 --> 10-14/12-13    PDR os and BDR od   S/P avastin os 10/19/2014   S/P PRP os   Seeing fozialla    S/p CE - os  - 5/18/2016 - set for near (same as pre-op)   S/P Phaco / IOL OD Date: 9/20/2017 - Aim for -2.5D per patient request  ( PCB00 18.5 )     Doing well  Cont glaucoma meds  Timolol gfs ou q am   Latanoprost qhs OU    F/u  4 months with HRT // no photo file pulled 1/23/2020 - not sure if there is one - check next visit

## 2020-01-21 NOTE — PROGRESS NOTES
Chart reviewed. Care Everywhere updates requested. Immunizations reconciled.  updated.  Eye exam scheduled 1/23/20

## 2020-01-23 ENCOUNTER — OFFICE VISIT (OUTPATIENT)
Dept: OPHTHALMOLOGY | Facility: CLINIC | Age: 70
End: 2020-01-23
Payer: MEDICARE

## 2020-01-23 ENCOUNTER — CLINICAL SUPPORT (OUTPATIENT)
Dept: OPHTHALMOLOGY | Facility: CLINIC | Age: 70
End: 2020-01-23
Payer: MEDICARE

## 2020-01-23 DIAGNOSIS — E11.3592 PROLIFERATIVE DIABETIC RETINOPATHY OF LEFT EYE WITHOUT MACULAR EDEMA ASSOCIATED WITH TYPE 2 DIABETES MELLITUS: ICD-10-CM

## 2020-01-23 DIAGNOSIS — H40.1123 PRIMARY OPEN ANGLE GLAUCOMA OF LEFT EYE, SEVERE STAGE: ICD-10-CM

## 2020-01-23 DIAGNOSIS — Z79.4 CONTROLLED TYPE 2 DIABETES MELLITUS WITH LEFT EYE AFFECTED BY PROLIFERATIVE RETINOPATHY WITHOUT MACULAR EDEMA, WITH LONG-TERM CURRENT USE OF INSULIN: ICD-10-CM

## 2020-01-23 DIAGNOSIS — Z96.1 PSEUDOPHAKIA, BOTH EYES: ICD-10-CM

## 2020-01-23 DIAGNOSIS — H40.1112 PRIMARY OPEN ANGLE GLAUCOMA OF RIGHT EYE, MODERATE STAGE: Primary | ICD-10-CM

## 2020-01-23 DIAGNOSIS — E11.3592 CONTROLLED TYPE 2 DIABETES MELLITUS WITH LEFT EYE AFFECTED BY PROLIFERATIVE RETINOPATHY WITHOUT MACULAR EDEMA, WITH LONG-TERM CURRENT USE OF INSULIN: ICD-10-CM

## 2020-01-23 PROCEDURE — 92014 PR EYE EXAM, EST PATIENT,COMPREHESV: ICD-10-PCS | Mod: S$PBB,,, | Performed by: OPHTHALMOLOGY

## 2020-01-23 PROCEDURE — 99212 OFFICE O/P EST SF 10 MIN: CPT | Mod: PBBFAC,25 | Performed by: OPHTHALMOLOGY

## 2020-01-23 PROCEDURE — 92083 HUMPHREY VISUAL FIELD - OU - BOTH EYES: ICD-10-PCS | Mod: 26,S$PBB,, | Performed by: OPHTHALMOLOGY

## 2020-01-23 PROCEDURE — 92083 EXTENDED VISUAL FIELD XM: CPT | Mod: PBBFAC | Performed by: OPHTHALMOLOGY

## 2020-01-23 PROCEDURE — 92133 CPTRZD OPH DX IMG PST SGM ON: CPT | Mod: PBBFAC | Performed by: OPHTHALMOLOGY

## 2020-01-23 PROCEDURE — 99999 PR PBB SHADOW E&M-EST. PATIENT-LVL II: ICD-10-PCS | Mod: PBBFAC,,, | Performed by: OPHTHALMOLOGY

## 2020-01-23 PROCEDURE — 99999 PR PBB SHADOW E&M-EST. PATIENT-LVL II: CPT | Mod: PBBFAC,,, | Performed by: OPHTHALMOLOGY

## 2020-01-23 PROCEDURE — 92014 COMPRE OPH EXAM EST PT 1/>: CPT | Mod: S$PBB,,, | Performed by: OPHTHALMOLOGY

## 2020-01-23 PROCEDURE — 92133 POSTERIOR SEGMENT OCT OPTIC NERVE(OCULAR COHERENCE TOMOGRAPHY) - OU - BOTH EYES: ICD-10-PCS | Mod: 26,S$PBB,, | Performed by: OPHTHALMOLOGY

## 2020-01-25 NOTE — PROGRESS NOTES
f done-[facility]  Assessment /Plan     For exam results, see Encounter Report.    Primary open angle glaucoma of right eye, moderate stage  -     Campos Visual Field - OU - Extended - Both Eyes    Primary open angle glaucoma of left eye, severe stage  -     Campos Visual Field - OU - Extended - Both Eyes                        0 = independent

## 2020-03-15 DIAGNOSIS — I10 HYPERTENSION, ESSENTIAL: ICD-10-CM

## 2020-03-21 NOTE — TELEPHONE ENCOUNTER
Please see the following assessment. This refill request still requires some action on your part. Cr increased to 1.7 (11/19) from 1.4 (1/19). Last commented that pt's baseline is 1.4-1.6 by Dr. Browning (11/19). Defer request to you. Thank you.

## 2020-03-21 NOTE — PROGRESS NOTES
Refill Authorization Note     is requesting a refill authorization.    Brief assessment and rationale for refill: DEFER: abnormal labs          Medication Therapy Plan: Last commented in 11/19 by Dr. Browning (Nephro) that pt's baseline Cr is 1.4 to 1.6; LAst Cr from 11/19 was 1.7, increased from previous 1.4 mg/dL; Pt has FOVS and FLOS upcoming; Defer to you                              Comments:     Refill Center Care Gap Closure protocols temporarily suspended.   Requested Prescriptions   Pending Prescriptions Disp Refills    losartan (COZAAR) 25 MG tablet [Pharmacy Med Name: LOSARTAN TABS 25MG] 90 tablet 0     Sig: TAKE 1 TABLET DAILY       Cardiovascular:  Angiotensin Receptor Blockers Failed - 3/15/2020  5:53 AM        Failed - Office visit in past 12 months or future 90 days.     Recent Outpatient Visits            1 month ago Primary open angle glaucoma of right eye, moderate stage    [facility] Mandie Mueller MD    4 months ago Controlled type 2 diabetes mellitus with stage 3 chronic kidney disease, with long-term current use of insulin    [facility] - Nephrology Manuel Browning MD    1 year ago Type 2 diabetes mellitus with chronic kidney disease, with long-term current use of insulin, unspecified CKD stage    [facility] - Internal Medicine Kt Stacy MD    1 year ago Encounter for preventive health examination    [facility] - Internal Medicine DEAN Mancilla    1 year ago Chronic kidney disease, stage II (mild)    [facility] - Nephharry Browning MD          Future Appointments              In 1 month LAB, SAME DAY [facility]    In 1 month Kt Stacy MD [facility]    In 2 months MD [facility] - Ophthalmology, [facility]                Failed - Cr is 1.3 or below and within 360 days     Creatinine   Date Value Ref Range Status   11/01/2019 1.7 (H) 0.5 - 1.4  mg/dL Final   01/25/2019 1.4 0.5 - 1.4 mg/dL Final   03/22/2018 1.4 0.5 - 1.4 mg/dL Final              Passed - Patient is at least 18 years old        Passed - Last BP in normal range within 360 days.     BP Readings from Last 3 Encounters:   11/01/19 110/80   02/01/19 110/60   01/11/19 (!) 100/59              Passed - K in normal range and within 360 days     Potassium   Date Value Ref Range Status   11/01/2019 4.5 3.5 - 5.1 mmol/L Final   01/25/2019 4.3 3.5 - 5.1 mmol/L Final   03/22/2018 4.7 3.5 - 5.1 mmol/L Final              Passed - eGFR within 360 days     eGFR if non    Date Value Ref Range Status   11/01/2019 40.5 (A) >60 mL/min/1.73 m^2 Final     Comment:     Calculation used to obtain the estimated glomerular filtration  rate (eGFR) is the CKD-EPI equation.      01/25/2019 51.6 (A) >60 mL/min/1.73 m^2 Final     Comment:     Calculation used to obtain the estimated glomerular filtration  rate (eGFR) is the CKD-EPI equation.      03/22/2018 52 (A) >60 mL/min/1.73 m^2 Final     Comment:     Calculation used to obtain the estimated glomerular filtration  rate (eGFR) is the CKD-EPI equation.        eGFR if    Date Value Ref Range Status   11/01/2019 46.9 (A) >60 mL/min/1.73 m^2 Final   01/25/2019 59.7 (A) >60 mL/min/1.73 m^2 Final   03/22/2018 >60 >60 mL/min/1.73 m^2 Final               Appointments  past 12m or future 3m with PCP    Date Provider   Last Visit   2/1/2019 Kt Stacy MD   Next Visit   5/14/2020 Kt Stacy MD   .  ED visits in past 90 days: 0       Note composed:1:02 PM 03/21/2020

## 2020-03-22 RX ORDER — LOSARTAN POTASSIUM 25 MG/1
TABLET ORAL
Qty: 90 TABLET | Refills: 0 | Status: SHIPPED | OUTPATIENT
Start: 2020-03-22 | End: 2020-06-12

## 2020-04-10 RX ORDER — INSULIN LISPRO 100 [IU]/ML
20 INJECTION, SOLUTION INTRAVENOUS; SUBCUTANEOUS
Qty: 6 BOX | Refills: 0 | Status: SHIPPED | OUTPATIENT
Start: 2020-04-10 | End: 2021-07-08 | Stop reason: SDUPTHER

## 2020-04-11 ENCOUNTER — TELEPHONE (OUTPATIENT)
Dept: INTERNAL MEDICINE | Facility: CLINIC | Age: 70
End: 2020-04-11

## 2020-04-11 NOTE — TELEPHONE ENCOUNTER
Please call patient and let him know that we have not been receiving refill requests for his insulin. The on-call provider informed us that he was having difficulty.     Please verify that he has everything he needs and to keep his appointment in may with me.    Also, please verify his pharmacy preference information. Thank you.

## 2020-04-13 RX ORDER — INSULIN GLARGINE 100 [IU]/ML
30 INJECTION, SOLUTION SUBCUTANEOUS NIGHTLY
Qty: 30 ML | Refills: 0 | Status: SHIPPED | OUTPATIENT
Start: 2020-04-13 | End: 2021-07-08 | Stop reason: SDUPTHER

## 2020-04-13 NOTE — TELEPHONE ENCOUNTER
----- Message from Nabila Encinas sent at 4/13/2020 10:11 AM CDT -----  Contact: Patient [phone]  Patient stated that she missed a call from Lenora MONDRAGON.    Please call and advise.    Thank You

## 2020-05-14 ENCOUNTER — OFFICE VISIT (OUTPATIENT)
Dept: INTERNAL MEDICINE | Facility: CLINIC | Age: 70
End: 2020-05-14
Attending: FAMILY MEDICINE
Payer: MEDICARE

## 2020-05-14 ENCOUNTER — LAB VISIT (OUTPATIENT)
Dept: LAB | Facility: HOSPITAL | Age: 70
End: 2020-05-14
Attending: FAMILY MEDICINE
Payer: MEDICARE

## 2020-05-14 VITALS
HEART RATE: 57 BPM | HEIGHT: 71 IN | DIASTOLIC BLOOD PRESSURE: 82 MMHG | BODY MASS INDEX: 32.9 KG/M2 | OXYGEN SATURATION: 99 % | SYSTOLIC BLOOD PRESSURE: 124 MMHG | TEMPERATURE: 98 F | WEIGHT: 235 LBS

## 2020-05-14 DIAGNOSIS — I10 HYPERTENSION, ESSENTIAL: ICD-10-CM

## 2020-05-14 DIAGNOSIS — M19.049 HAND ARTHRITIS: ICD-10-CM

## 2020-05-14 DIAGNOSIS — E11.3592 CONTROLLED TYPE 2 DIABETES MELLITUS WITH LEFT EYE AFFECTED BY PROLIFERATIVE RETINOPATHY WITHOUT MACULAR EDEMA, WITH LONG-TERM CURRENT USE OF INSULIN: ICD-10-CM

## 2020-05-14 DIAGNOSIS — K63.5 POLYP OF COLON, UNSPECIFIED PART OF COLON, UNSPECIFIED TYPE: ICD-10-CM

## 2020-05-14 DIAGNOSIS — E11.42 TYPE 2 DIABETES MELLITUS WITH DIABETIC POLYNEUROPATHY, WITH LONG-TERM CURRENT USE OF INSULIN: Chronic | ICD-10-CM

## 2020-05-14 DIAGNOSIS — Z79.4 CONTROLLED TYPE 2 DIABETES MELLITUS WITH RIGHT EYE AFFECTED BY MODERATE NONPROLIFERATIVE RETINOPATHY WITHOUT MACULAR EDEMA, WITH LONG-TERM CURRENT USE OF INSULIN: Primary | ICD-10-CM

## 2020-05-14 DIAGNOSIS — Z12.5 PROSTATE CANCER SCREENING: ICD-10-CM

## 2020-05-14 DIAGNOSIS — Z79.4 CONTROLLED TYPE 2 DIABETES MELLITUS WITH LEFT EYE AFFECTED BY PROLIFERATIVE RETINOPATHY WITHOUT MACULAR EDEMA, WITH LONG-TERM CURRENT USE OF INSULIN: ICD-10-CM

## 2020-05-14 DIAGNOSIS — N18.30 CKD (CHRONIC KIDNEY DISEASE) STAGE 3, GFR 30-59 ML/MIN: ICD-10-CM

## 2020-05-14 DIAGNOSIS — E11.3391 CONTROLLED TYPE 2 DIABETES MELLITUS WITH RIGHT EYE AFFECTED BY MODERATE NONPROLIFERATIVE RETINOPATHY WITHOUT MACULAR EDEMA, WITH LONG-TERM CURRENT USE OF INSULIN: Primary | ICD-10-CM

## 2020-05-14 DIAGNOSIS — E11.22 TYPE 2 DIABETES MELLITUS WITH CHRONIC KIDNEY DISEASE, WITH LONG-TERM CURRENT USE OF INSULIN, UNSPECIFIED CKD STAGE: Chronic | ICD-10-CM

## 2020-05-14 DIAGNOSIS — Z79.4 TYPE 2 DIABETES MELLITUS WITH DIABETIC POLYNEUROPATHY, WITH LONG-TERM CURRENT USE OF INSULIN: Chronic | ICD-10-CM

## 2020-05-14 DIAGNOSIS — Z79.4 TYPE 2 DIABETES MELLITUS WITH CHRONIC KIDNEY DISEASE, WITH LONG-TERM CURRENT USE OF INSULIN, UNSPECIFIED CKD STAGE: Chronic | ICD-10-CM

## 2020-05-14 DIAGNOSIS — Z12.11 COLON CANCER SCREENING: ICD-10-CM

## 2020-05-14 DIAGNOSIS — E78.5 HYPERLIPIDEMIA, UNSPECIFIED HYPERLIPIDEMIA TYPE: ICD-10-CM

## 2020-05-14 DIAGNOSIS — Z00.00 ANNUAL PHYSICAL EXAM: ICD-10-CM

## 2020-05-14 LAB
ALBUMIN SERPL BCP-MCNC: 3.8 G/DL (ref 3.5–5.2)
ALP SERPL-CCNC: 66 U/L (ref 55–135)
ALT SERPL W/O P-5'-P-CCNC: 14 U/L (ref 10–44)
ANION GAP SERPL CALC-SCNC: 11 MMOL/L (ref 8–16)
AST SERPL-CCNC: 27 U/L (ref 10–40)
BILIRUB SERPL-MCNC: 0.8 MG/DL (ref 0.1–1)
BUN SERPL-MCNC: 17 MG/DL (ref 8–23)
CALCIUM SERPL-MCNC: 9.3 MG/DL (ref 8.7–10.5)
CHLORIDE SERPL-SCNC: 105 MMOL/L (ref 95–110)
CHOLEST SERPL-MCNC: 209 MG/DL (ref 120–199)
CHOLEST/HDLC SERPL: 3.7 {RATIO} (ref 2–5)
CO2 SERPL-SCNC: 25 MMOL/L (ref 23–29)
COMPLEXED PSA SERPL-MCNC: 1.7 NG/ML (ref 0–4)
CREAT SERPL-MCNC: 1.5 MG/DL (ref 0.5–1.4)
EST. GFR  (AFRICAN AMERICAN): 54.5 ML/MIN/1.73 M^2
EST. GFR  (NON AFRICAN AMERICAN): 47.2 ML/MIN/1.73 M^2
ESTIMATED AVG GLUCOSE: 151 MG/DL (ref 68–131)
GLUCOSE SERPL-MCNC: 98 MG/DL (ref 70–110)
HBA1C MFR BLD HPLC: 6.9 % (ref 4–5.6)
HDLC SERPL-MCNC: 56 MG/DL (ref 40–75)
HDLC SERPL: 26.8 % (ref 20–50)
LDLC SERPL CALC-MCNC: 144 MG/DL (ref 63–159)
NONHDLC SERPL-MCNC: 153 MG/DL
POTASSIUM SERPL-SCNC: 4.8 MMOL/L (ref 3.5–5.1)
PROT SERPL-MCNC: 6.6 G/DL (ref 6–8.4)
SODIUM SERPL-SCNC: 141 MMOL/L (ref 136–145)
TRIGL SERPL-MCNC: 45 MG/DL (ref 30–150)

## 2020-05-14 PROCEDURE — 36415 COLL VENOUS BLD VENIPUNCTURE: CPT

## 2020-05-14 PROCEDURE — 80053 COMPREHEN METABOLIC PANEL: CPT

## 2020-05-14 PROCEDURE — 99214 OFFICE O/P EST MOD 30 MIN: CPT | Mod: S$PBB,,, | Performed by: FAMILY MEDICINE

## 2020-05-14 PROCEDURE — 99999 PR PBB SHADOW E&M-EST. PATIENT-LVL IV: CPT | Mod: PBBFAC,,, | Performed by: FAMILY MEDICINE

## 2020-05-14 PROCEDURE — 99214 OFFICE O/P EST MOD 30 MIN: CPT | Mod: PBBFAC | Performed by: FAMILY MEDICINE

## 2020-05-14 PROCEDURE — 99999 PR PBB SHADOW E&M-EST. PATIENT-LVL IV: ICD-10-PCS | Mod: PBBFAC,,, | Performed by: FAMILY MEDICINE

## 2020-05-14 PROCEDURE — 84153 ASSAY OF PSA TOTAL: CPT

## 2020-05-14 PROCEDURE — 80061 LIPID PANEL: CPT

## 2020-05-14 PROCEDURE — 83036 HEMOGLOBIN GLYCOSYLATED A1C: CPT

## 2020-05-14 PROCEDURE — 99214 PR OFFICE/OUTPT VISIT, EST, LEVL IV, 30-39 MIN: ICD-10-PCS | Mod: S$PBB,,, | Performed by: FAMILY MEDICINE

## 2020-05-14 RX ORDER — VARDENAFIL HYDROCHLORIDE 20 MG/1
20 TABLET ORAL DAILY PRN
Qty: 30 TABLET | Refills: 1 | Status: SHIPPED | OUTPATIENT
Start: 2020-05-14 | End: 2021-07-08 | Stop reason: SDUPTHER

## 2020-05-14 NOTE — PROGRESS NOTES
Subjective:       Patient ID: Jamarcus Mcqueen is a 69 y.o. male.    Chief Complaint: Follow-up    Established patient follows up for management of chronic medical illnesses with complaints today. Please see dictation and ROS for interval problems, specific complaints and disease management discussion.    P, S, Fm, Soc Hx's; Meds, allergies reviewed and reconciled.  Health maintenance file reviewed and addressed items due.    Pain in the right 4th MCP and left 2nd MCP.  Comes and goes.  No other significant joints involved.  No fever chills or constitutional complaints.    States his sugars used to be a bit better when he took metformin but I reviewed his last BMP with an elevated creatinine.  Explained to him the precaution with metformin and mild CKD.    Probable distant allergy to the glucosamine chondroitin.  Tylenol recommended.    Review of Systems   Constitutional: Negative for chills, fatigue, fever and unexpected weight change.   HENT: Negative for congestion and trouble swallowing.    Eyes: Negative for redness and visual disturbance.   Respiratory: Negative for cough, chest tightness and shortness of breath.    Cardiovascular: Negative for chest pain, palpitations and leg swelling.   Gastrointestinal: Negative for abdominal pain and blood in stool.   Genitourinary: Negative for difficulty urinating and hematuria.   Musculoskeletal: Positive for arthralgias. Negative for back pain, gait problem, joint swelling, myalgias and neck pain.   Skin: Negative for color change and rash.   Neurological: Negative for tremors, speech difficulty, weakness, numbness and headaches.   Hematological: Negative for adenopathy. Does not bruise/bleed easily.   Psychiatric/Behavioral: Negative for behavioral problems, confusion and sleep disturbance. The patient is not nervous/anxious.        Objective:      Physical Exam   Constitutional: He is oriented to person, place, and time. He appears well-developed and well-nourished.    Eyes: No scleral icterus.   Neck: Normal range of motion. Neck supple. No JVD present. Carotid bruit is not present. No tracheal deviation present. No thyromegaly present.   Cardiovascular: Normal rate, regular rhythm, normal heart sounds and intact distal pulses. Exam reveals no gallop and no friction rub.   No murmur heard.  Pulses:       Dorsalis pedis pulses are 2+ on the right side, and 2+ on the left side.   Pulmonary/Chest: Effort normal and breath sounds normal. No respiratory distress. He has no wheezes. He has no rales.   Abdominal: Soft. He exhibits no distension and no mass. There is no tenderness. There is no rebound and no guarding.   Musculoskeletal: He exhibits no edema.   Feet:   Right Foot:   Protective Sensation: 3 sites tested. 3 sites sensed.   Skin Integrity: Negative for skin breakdown.   Left Foot:   Protective Sensation: 3 sites tested. 3 sites sensed.   Skin Integrity: Negative for skin breakdown.   Lymphadenopathy:     He has no cervical adenopathy.   Neurological: He is alert and oriented to person, place, and time. He displays no tremor. No cranial nerve deficit. Coordination and gait normal.   Skin: Skin is warm and dry. No rash noted. He is not diaphoretic. No erythema.   Psychiatric: He has a normal mood and affect. His behavior is normal. Judgment and thought content normal.   Nursing note and vitals reviewed.      Assessment:       1. Controlled type 2 diabetes mellitus with right eye affected by moderate nonproliferative retinopathy without macular edema, with long-term current use of insulin    2. Controlled type 2 diabetes mellitus with left eye affected by proliferative retinopathy without macular edema, with long-term current use of insulin    3. Hypertension, essential    4. Hyperlipidemia, unspecified hyperlipidemia type    5. Polyp of colon, unspecified part of colon, unspecified type    6. Colon cancer screening    7. CKD (chronic kidney disease) stage 3, GFR 30-59 ml/min     8. Type 2 diabetes mellitus with diabetic polyneuropathy, with long-term current use of insulin    9. Type 2 diabetes mellitus with chronic kidney disease, with long-term current use of insulin, unspecified CKD stage    10. Hand arthritis        Plan:     Medication List with Changes/Refills   Current Medications    EZETIMIBE (ZETIA) 10 MG TABLET    TAKE 1 TABLET DAILY    INSULIN (LANTUS SOLOSTAR U-100 INSULIN) GLARGINE 100 UNITS/ML (3ML) SUBQ PEN    Inject 30 Units into the skin every evening.    INSULIN LISPRO (HUMALOG KWIKPEN INSULIN) 100 UNIT/ML PEN    Inject 20 Units into the skin before meals as needed.    LATANOPROST 0.005 % OPHTHALMIC SOLUTION    Place 1 drop into both eyes every evening.    LOSARTAN (COZAAR) 25 MG TABLET    TAKE 1 TABLET DAILY    SIMVASTATIN (ZOCOR) 40 MG TABLET    TAKE 1 TABLET EVERY EVENING    TIMOLOL MALEATE 0.5% (TIMOPTIC-XE) 0.5 % SOLG    INSTILL 1 DROP IN BOTH EYES ONCE DAILY    TRAZODONE (DESYREL) 50 MG TABLET    TAKE 1 TABLET NIGHTLY AS NEEDED FOR INSOMNIA    TRIAMCINOLONE ACETONIDE 0.1% (KENALOG) 0.1 % CREAM    Apply topically 2 (two) times daily.   Changed and/or Refilled Medications    Modified Medication Previous Medication    VARDENAFIL (LEVITRA) 20 MG TABLET vardenafil (LEVITRA) 20 MG tablet       Take 1 tablet (20 mg total) by mouth daily as needed for Erectile Dysfunction.    Take 1 tablet (20 mg total) by mouth daily as needed for Erectile Dysfunction.     Jamarcus was seen today for follow-up.    Diagnoses and all orders for this visit:    Controlled type 2 diabetes mellitus with right eye affected by moderate nonproliferative retinopathy without macular edema, with long-term current use of insulin    Controlled type 2 diabetes mellitus with left eye affected by proliferative retinopathy without macular edema, with long-term current use of insulin    Hypertension, essential    Hyperlipidemia, unspecified hyperlipidemia type    Polyp of colon, unspecified part of colon,  unspecified type  -     Case request GI: COLONOSCOPY    Colon cancer screening  -     Case request GI: COLONOSCOPY    CKD (chronic kidney disease) stage 3, GFR 30-59 ml/min    Type 2 diabetes mellitus with diabetic polyneuropathy, with long-term current use of insulin  -     Ambulatory referral/consult to Podiatry; Future    Type 2 diabetes mellitus with chronic kidney disease, with long-term current use of insulin, unspecified CKD stage    Hand arthritis  Comments:  c/w osteoarthritis    Other orders  -     vardenafiL (LEVITRA) 20 MG tablet; Take 1 tablet (20 mg total) by mouth daily as needed for Erectile Dysfunction.      See meds, orders, follow up, routing and instructions sections of encounter and AVS. Discussed with patient and provided on AVS.    Given the acuity of patient's complaints, complexity and risk of acute or chronic disease states, and the necessity to perform aspects of an exam that cannot be accomplished 'virtually', I consider patient's visit medically necessary. All precautions including masks, distancing, room cleansing, etc were employed.    Today's laboratory pending.  Will call him.  Continue current medications.

## 2020-05-14 NOTE — PATIENT INSTRUCTIONS
If not contacted in a couple weeks by colonoscopy scheduling department - call Colonoscopy Scheduling Number - [phone].

## 2020-05-19 ENCOUNTER — PATIENT OUTREACH (OUTPATIENT)
Dept: ADMINISTRATIVE | Facility: OTHER | Age: 70
End: 2020-05-19

## 2020-05-19 NOTE — PROGRESS NOTES
Patient's chart was reviewed.   Requested updates within Care Everywhere.  Immunizations reconciled.    Open case request for Colonoscopy.  Health Maintenance was updated.

## 2020-05-21 ENCOUNTER — OFFICE VISIT (OUTPATIENT)
Dept: OPHTHALMOLOGY | Facility: CLINIC | Age: 70
End: 2020-05-21
Payer: MEDICARE

## 2020-05-21 DIAGNOSIS — E11.3592 PROLIFERATIVE DIABETIC RETINOPATHY OF LEFT EYE WITHOUT MACULAR EDEMA ASSOCIATED WITH TYPE 2 DIABETES MELLITUS: ICD-10-CM

## 2020-05-21 DIAGNOSIS — Z79.4 CONTROLLED TYPE 2 DIABETES MELLITUS WITH LEFT EYE AFFECTED BY PROLIFERATIVE RETINOPATHY WITHOUT MACULAR EDEMA, WITH LONG-TERM CURRENT USE OF INSULIN: ICD-10-CM

## 2020-05-21 DIAGNOSIS — Z96.1 PSEUDOPHAKIA, BOTH EYES: ICD-10-CM

## 2020-05-21 DIAGNOSIS — E11.3592 CONTROLLED TYPE 2 DIABETES MELLITUS WITH LEFT EYE AFFECTED BY PROLIFERATIVE RETINOPATHY WITHOUT MACULAR EDEMA, WITH LONG-TERM CURRENT USE OF INSULIN: ICD-10-CM

## 2020-05-21 DIAGNOSIS — H40.1123 PRIMARY OPEN ANGLE GLAUCOMA OF LEFT EYE, SEVERE STAGE: ICD-10-CM

## 2020-05-21 DIAGNOSIS — H40.1112 PRIMARY OPEN ANGLE GLAUCOMA OF RIGHT EYE, MODERATE STAGE: Primary | ICD-10-CM

## 2020-05-21 PROCEDURE — 92020 GONIOSCOPY: CPT | Mod: PBBFAC | Performed by: OPHTHALMOLOGY

## 2020-05-21 PROCEDURE — 92020 PR SPECIAL EYE EVAL,GONIOSCOPY: ICD-10-PCS | Mod: S$PBB,,, | Performed by: OPHTHALMOLOGY

## 2020-05-21 PROCEDURE — 99212 OFFICE O/P EST SF 10 MIN: CPT | Mod: PBBFAC | Performed by: OPHTHALMOLOGY

## 2020-05-21 PROCEDURE — 92020 GONIOSCOPY: CPT | Mod: S$PBB,,, | Performed by: OPHTHALMOLOGY

## 2020-05-21 PROCEDURE — 99999 PR PBB SHADOW E&M-EST. PATIENT-LVL II: ICD-10-PCS | Mod: PBBFAC,,, | Performed by: OPHTHALMOLOGY

## 2020-05-21 PROCEDURE — 99999 PR PBB SHADOW E&M-EST. PATIENT-LVL II: CPT | Mod: PBBFAC,,, | Performed by: OPHTHALMOLOGY

## 2020-05-21 PROCEDURE — 92012 PR EYE EXAM, EST PATIENT,INTERMED: ICD-10-PCS | Mod: S$PBB,,, | Performed by: OPHTHALMOLOGY

## 2020-05-21 PROCEDURE — 92012 INTRM OPH EXAM EST PATIENT: CPT | Mod: S$PBB,,, | Performed by: OPHTHALMOLOGY

## 2020-05-21 NOTE — PROGRESS NOTES
HPI     DLS: 4 month check;    Meds:   T1/2 GFS QAM OU   Latanoprost QHS OU     1) POAG OS>>OD   2) PCIOL OU   3) Type 2 DM   4) BDR OD   5) PDR OS   6) VH OS     Last edited by Billie Douglas on 5/21/2020  3:12 PM. (History)            Assessment /Plan     For exam results, see Encounter Report.    Primary open angle glaucoma of right eye, moderate stage    Primary open angle glaucoma of left eye, severe stage    Controlled type 2 diabetes mellitus with left eye affected by proliferative retinopathy without macular edema, with long-term current use of insulin    Proliferative diabetic retinopathy of left eye without macular edema associated with type 2 diabetes mellitus    Pseudophakia, both eyes          1. POAG od moderate stage // POAG severe stage OS  Followed as a borderline glaucoma with ocular hypertension [age] yo 2014     First HVF   2008   First photos   2011   Treatment / Drops started   7/2014              Dx made 4/2014             Family history    + sister /  + mom also a supect - Rx with gtts for a while then taken off        Glaucoma meds    none        H/O adverse rxn to glaucoma drops    none        LASERS    none        GLAUCOMA SURGERIES    none        OTHER EYE SURGERIES    Phaco/IOL OD 9/20/2017 Phaco/IOL OS 5/18/2016 - set for near OU        CDR    0.9/0.95        Tbase    17-26 / 17-26        Tmax    26/26          Ttarget   16-17 ou             HVF    7 test 2008 to  2020 - full /nonspecific defects  od // marked gen dep / SAD / IAD - paracentral loss  os        Gonio    +3 ou (no NVI or NVA os - s/p avastin for PDR)        CCT    544/539        OCT    3 test 2013 to 2020 - RNFL - OD:dec. G/T/TI/N  // OS:dec. G/TS/T/TIayanna        HRT    2 test ([place])  2008 to 2011 - MR - nl od // nl os                    4 test ([facility]) 2014 - 2018 -  -  [place] off od // dec. Iayanna os         Disc photos    2011 - OIS    - Ttoday   14/13  - Test done today    IOP //  Gonio      3. Myopia,  astigmatism, presbyopia  Glasses  Use to use CTL's [place] [facility] [place]     4. Diabetes mellitus, type 2   + PDR os - s/p avastin #1 10/19/2014 - [place]  S/P PRP OS   + BDR - od  Sees Jose Angel     5. VH OS- 2/2 DR - resolved  Saw P[facility] - 9/3/2015   6. PC IOL os - 5/18/2016 -   -PCB00 18.5 - aimed for near vision per pts request (-2.50) // complex trypan blue   PC IOL od - 9/20/2017 - 18.5 - set for near / complex trypan blue        POAG - OS severe stage  POAG- OD moderate stage  + HVF loss os > OD  + OCT - RNFL changes ou  T base 17-26 ou  + fm hx - sister and ?mom  Cont  - Latanoprost - use OU again   IOP much better with addition of timolol 21/23 --> 10-14/12-13    PDR os and BDR od   S/P avastin os 10/19/2014   S/P PRP os   Seeing jose angel    S/p CE - os  - 5/18/2016 - set for near (same as pre-op)   S/P Phaco / IOL OD Date: 9/20/2017 - Aim for -2.5D per patient request  ( PCB00 18.5 )     Doing well  Cont glaucoma meds  Timolol gfs ou q am   Latanoprost qhs OU    F/u  4 months with HRT // no photo file pulled 1/23/2020 - not sure if there is one - check next visit

## 2020-06-02 ENCOUNTER — PES CALL (OUTPATIENT)
Dept: ADMINISTRATIVE | Facility: CLINIC | Age: 70
End: 2020-06-02

## 2020-06-12 DIAGNOSIS — I10 HYPERTENSION, ESSENTIAL: ICD-10-CM

## 2020-06-13 NOTE — PROGRESS NOTES
Refill Routing Note    Medication(s) are not appropriate for processing by [facility]:       Required laboratory values are abnormal        Medication Therapy Plan: SCr>1.3 but has decreased from 1.7 to 1.5; Defer to you  Medication reconciliation completed: No      Automatic Epic Protocol Generated Data:    Requested Prescriptions   Pending Prescriptions Disp Refills    losartan (COZAAR) 25 MG tablet [Pharmacy Med Name: LOSARTAN TABS 25MG] 90 tablet 0     Sig: TAKE 1 TABLET DAILY       Cardiovascular:  Angiotensin Receptor Blockers Failed - 6/12/2020 11:38 PM        Failed - Cr is 1.3 or below and within 360 days     Creatinine   Date Value Ref Range Status   05/14/2020 1.5 (H) 0.5 - 1.4 mg/dL Final   11/01/2019 1.7 (H) 0.5 - 1.4 mg/dL Final   01/25/2019 1.4 0.5 - 1.4 mg/dL Final              Passed - Patient is at least 18 years old        Passed - Last BP in normal range within 360 days.     BP Readings from Last 3 Encounters:   05/14/20 124/82   11/01/19 110/80   02/01/19 110/60              Passed - Office visit in past 12 months or future 90 days.     Recent Outpatient Visits            3 weeks ago Primary open angle glaucoma of right eye, moderate stage    [facility] Ophthalmology Mandie Mueller MD    4 weeks ago Controlled type 2 diabetes mellitus with right eye affected by moderate nonproliferative retinopathy without macular edema, with long-term current use of insulin    [facility] Internal Medicine Kt Stacy MD    4 months ago Primary open angle glaucoma of right eye, moderate stage    [facility] Ophthalmology Mandie Mueller MD    7 months ago Controlled type 2 diabetes mellitus with stage 3 chronic kidney disease, with long-term current use of insulin    [facility] - Nephrology Manuel Browning MD    1 year ago Type 2 diabetes mellitus with chronic kidney disease, with long-term current use of insulin, unspecified CKD stage    [facility] - Internal Medicine Kt ROOT  MD Regis          Future Appointments              In 1 week MAC [facility] - Podiatry, [address]    In 1 week ISRAEL Mancilla raquel - Internal Medicine, [facility]    In 5 months MD Jori Alcocer raquel - Internal Medicine, [facility]                Passed - K in normal range and within 360 days     Potassium   Date Value Ref Range Status   05/14/2020 4.8 3.5 - 5.1 mmol/L Final   11/01/2019 4.5 3.5 - 5.1 mmol/L Final   01/25/2019 4.3 3.5 - 5.1 mmol/L Final              Passed - eGFR within 360 days     eGFR if non    Date Value Ref Range Status   05/14/2020 47.2 (A) >60 mL/min/1.73 m^2 Final     Comment:     Calculation used to obtain the estimated glomerular filtration  rate (eGFR) is the CKD-EPI equation.      11/01/2019 40.5 (A) >60 mL/min/1.73 m^2 Final     Comment:     Calculation used to obtain the estimated glomerular filtration  rate (eGFR) is the CKD-EPI equation.      01/25/2019 51.6 (A) >60 mL/min/1.73 m^2 Final     Comment:     Calculation used to obtain the estimated glomerular filtration  rate (eGFR) is the CKD-EPI equation.        eGFR if    Date Value Ref Range Status   05/14/2020 54.5 (A) >60 mL/min/1.73 m^2 Final   11/01/2019 46.9 (A) >60 mL/min/1.73 m^2 Final   01/25/2019 59.7 (A) >60 mL/min/1.73 m^2 Final                 Appointments  past 12m or future 3m with PCP    Date Provider   Last Visit   5/14/2020 Kt Stacy MD   Next Visit   11/16/2020 Kt Stacy MD   ED visits in past 90 days: 0     Note composed:11:43 PM 06/12/2020

## 2020-06-15 RX ORDER — LOSARTAN POTASSIUM 25 MG/1
TABLET ORAL
Qty: 90 TABLET | Refills: 0 | Status: SHIPPED | OUTPATIENT
Start: 2020-06-15 | End: 2020-09-15

## 2020-06-18 ENCOUNTER — PATIENT OUTREACH (OUTPATIENT)
Dept: ADMINISTRATIVE | Facility: OTHER | Age: 70
End: 2020-06-18

## 2020-06-18 NOTE — PROGRESS NOTES
LINKS immunization registry triggered  Care Everywhere updated  Health Maintenance updated  Chart reviewed for overdue Proactive [facility] health maintenance testing  Patient has open case request for colonoscopy.

## 2020-06-19 ENCOUNTER — TELEPHONE (OUTPATIENT)
Dept: INTERNAL MEDICINE | Facility: CLINIC | Age: 70
End: 2020-06-19

## 2020-06-19 ENCOUNTER — OFFICE VISIT (OUTPATIENT)
Dept: PODIATRY | Facility: CLINIC | Age: 70
End: 2020-06-19
Attending: FAMILY MEDICINE
Payer: MEDICARE

## 2020-06-19 ENCOUNTER — OFFICE VISIT (OUTPATIENT)
Dept: INTERNAL MEDICINE | Facility: CLINIC | Age: 70
End: 2020-06-19
Payer: MEDICARE

## 2020-06-19 VITALS
HEART RATE: 64 BPM | DIASTOLIC BLOOD PRESSURE: 65 MMHG | BODY MASS INDEX: 30.69 KG/M2 | WEIGHT: 231.56 LBS | SYSTOLIC BLOOD PRESSURE: 107 MMHG | HEIGHT: 73 IN

## 2020-06-19 VITALS
SYSTOLIC BLOOD PRESSURE: 107 MMHG | BODY MASS INDEX: 32.9 KG/M2 | HEIGHT: 71 IN | DIASTOLIC BLOOD PRESSURE: 65 MMHG | RESPIRATION RATE: 18 BRPM | HEART RATE: 64 BPM | WEIGHT: 235 LBS

## 2020-06-19 DIAGNOSIS — Z79.4 CONTROLLED TYPE 2 DIABETES MELLITUS WITH RIGHT EYE AFFECTED BY MODERATE NONPROLIFERATIVE RETINOPATHY WITHOUT MACULAR EDEMA, WITH LONG-TERM CURRENT USE OF INSULIN: ICD-10-CM

## 2020-06-19 DIAGNOSIS — Z79.4 TYPE 2 DIABETES MELLITUS WITH CHRONIC KIDNEY DISEASE, WITH LONG-TERM CURRENT USE OF INSULIN, UNSPECIFIED CKD STAGE: Chronic | ICD-10-CM

## 2020-06-19 DIAGNOSIS — H52.203 MYOPIA OF BOTH EYES WITH ASTIGMATISM AND PRESBYOPIA: ICD-10-CM

## 2020-06-19 DIAGNOSIS — E11.42 TYPE 2 DIABETES MELLITUS WITH DIABETIC POLYNEUROPATHY, WITH LONG-TERM CURRENT USE OF INSULIN: Chronic | ICD-10-CM

## 2020-06-19 DIAGNOSIS — Z00.00 ENCOUNTER FOR PREVENTIVE HEALTH EXAMINATION: Primary | ICD-10-CM

## 2020-06-19 DIAGNOSIS — E11.3592 PROLIFERATIVE DIABETIC RETINOPATHY OF LEFT EYE WITHOUT MACULAR EDEMA ASSOCIATED WITH TYPE 2 DIABETES MELLITUS: ICD-10-CM

## 2020-06-19 DIAGNOSIS — Z12.11 COLON CANCER SCREENING: ICD-10-CM

## 2020-06-19 DIAGNOSIS — E11.22 TYPE 2 DIABETES MELLITUS WITH CHRONIC KIDNEY DISEASE, WITH LONG-TERM CURRENT USE OF INSULIN, UNSPECIFIED CKD STAGE: Chronic | ICD-10-CM

## 2020-06-19 DIAGNOSIS — Z79.4 TYPE 2 DIABETES MELLITUS WITH DIABETIC POLYNEUROPATHY, WITH LONG-TERM CURRENT USE OF INSULIN: Chronic | ICD-10-CM

## 2020-06-19 DIAGNOSIS — H25.11 NUCLEAR SCLEROSIS OF RIGHT EYE: ICD-10-CM

## 2020-06-19 DIAGNOSIS — H52.13 MYOPIA OF BOTH EYES WITH ASTIGMATISM AND PRESBYOPIA: ICD-10-CM

## 2020-06-19 DIAGNOSIS — H52.4 MYOPIA OF BOTH EYES WITH ASTIGMATISM AND PRESBYOPIA: ICD-10-CM

## 2020-06-19 DIAGNOSIS — E11.3393 MODERATE NONPROLIFERATIVE DIABETIC RETINOPATHY OF BOTH EYES WITHOUT MACULAR EDEMA ASSOCIATED WITH TYPE 2 DIABETES MELLITUS: ICD-10-CM

## 2020-06-19 DIAGNOSIS — E11.3391 CONTROLLED TYPE 2 DIABETES MELLITUS WITH RIGHT EYE AFFECTED BY MODERATE NONPROLIFERATIVE RETINOPATHY WITHOUT MACULAR EDEMA, WITH LONG-TERM CURRENT USE OF INSULIN: ICD-10-CM

## 2020-06-19 DIAGNOSIS — B35.3 TINEA PEDIS OF BOTH FEET: Primary | ICD-10-CM

## 2020-06-19 DIAGNOSIS — N18.30 CKD (CHRONIC KIDNEY DISEASE) STAGE 3, GFR 30-59 ML/MIN: ICD-10-CM

## 2020-06-19 DIAGNOSIS — E78.5 HYPERLIPIDEMIA, UNSPECIFIED HYPERLIPIDEMIA TYPE: ICD-10-CM

## 2020-06-19 DIAGNOSIS — I10 HYPERTENSION, ESSENTIAL: ICD-10-CM

## 2020-06-19 DIAGNOSIS — E11.3592 CONTROLLED TYPE 2 DIABETES MELLITUS WITH LEFT EYE AFFECTED BY PROLIFERATIVE RETINOPATHY WITHOUT MACULAR EDEMA, WITH LONG-TERM CURRENT USE OF INSULIN: ICD-10-CM

## 2020-06-19 DIAGNOSIS — Z79.4 CONTROLLED TYPE 2 DIABETES MELLITUS WITH LEFT EYE AFFECTED BY PROLIFERATIVE RETINOPATHY WITHOUT MACULAR EDEMA, WITH LONG-TERM CURRENT USE OF INSULIN: ICD-10-CM

## 2020-06-19 PROCEDURE — G0439 PPPS, SUBSEQ VISIT: HCPCS | Mod: S$GLB,,, | Performed by: NURSE PRACTITIONER

## 2020-06-19 PROCEDURE — 99214 OFFICE O/P EST MOD 30 MIN: CPT | Mod: PBBFAC | Performed by: PODIATRIST

## 2020-06-19 PROCEDURE — 99999 PR PBB SHADOW E&M-EST. PATIENT-LVL IV: ICD-10-PCS | Mod: PBBFAC,,, | Performed by: PODIATRIST

## 2020-06-19 PROCEDURE — 99214 PR OFFICE/OUTPT VISIT, EST, LEVL IV, 30-39 MIN: ICD-10-PCS | Mod: S$PBB,,, | Performed by: PODIATRIST

## 2020-06-19 PROCEDURE — 99214 OFFICE O/P EST MOD 30 MIN: CPT | Mod: PBBFAC,27 | Performed by: NURSE PRACTITIONER

## 2020-06-19 PROCEDURE — G0439 PR MEDICARE ANNUAL WELLNESS SUBSEQUENT VISIT: ICD-10-PCS | Mod: S$GLB,,, | Performed by: NURSE PRACTITIONER

## 2020-06-19 PROCEDURE — 99999 PR PBB SHADOW E&M-EST. PATIENT-LVL IV: CPT | Mod: PBBFAC,,, | Performed by: NURSE PRACTITIONER

## 2020-06-19 PROCEDURE — 99999 PR PBB SHADOW E&M-EST. PATIENT-LVL IV: ICD-10-PCS | Mod: PBBFAC,,, | Performed by: NURSE PRACTITIONER

## 2020-06-19 PROCEDURE — 99214 OFFICE O/P EST MOD 30 MIN: CPT | Mod: S$PBB,,, | Performed by: PODIATRIST

## 2020-06-19 PROCEDURE — 99999 PR PBB SHADOW E&M-EST. PATIENT-LVL IV: CPT | Mod: PBBFAC,,, | Performed by: PODIATRIST

## 2020-06-19 RX ORDER — KETOCONAZOLE 20 MG/G
CREAM TOPICAL DAILY
Qty: 60 G | Refills: 3 | Status: SHIPPED | OUTPATIENT
Start: 2020-06-19 | End: 2023-12-05

## 2020-06-19 NOTE — PROGRESS NOTES
"Jamarcus Mcqueen presented for a  Medicare AWV and comprehensive Health Risk Assessment today. The following components were reviewed and updated:    · Medical history  · Family History  · Social history  · Allergies and Current Medications  · Health Risk Assessment  · Health Maintenance  · Care Team     ** See Completed Assessments for Annual Wellness Visit within the encounter summary.**       The following assessments were completed:  · Living Situation  · CAGE  · Depression Screening  · Timed Get Up and Go  · Whisper Test  Cognitive Function Screening    ·   · Nutrition Screening  · ADL Screening  · PAQ Screening    Vitals:    06/19/20 1315   BP: 107/65   Pulse: 64   Weight: 105 kg (231 lb 9.5 oz)   Height: 6' 1" (1.854 m)     Body mass index is 30.56 kg/m².  Physical Exam  Vitals signs and nursing note reviewed.   Constitutional:       General: He is not in acute distress.     Appearance: Normal appearance. He is well-developed. He is not ill-appearing, toxic-appearing or diaphoretic.   HENT:      Head: Normocephalic and atraumatic.      Nose: Nose normal.   Eyes:      Conjunctiva/sclera: Conjunctivae normal.   Cardiovascular:      Rate and Rhythm: Normal rate and regular rhythm.      Heart sounds: Normal heart sounds.   Pulmonary:      Effort: Pulmonary effort is normal.      Breath sounds: Normal breath sounds.   Musculoskeletal: Normal range of motion.   Skin:     General: Skin is warm and dry.   Neurological:      Mental Status: He is alert and oriented to person, place, and time.   Psychiatric:         Behavior: Behavior normal.         Thought Content: Thought content normal.         Judgment: Judgment normal.           Diagnoses and health risks identified today and associated recommendations/orders:    1. Encounter for preventive health examination  Assessment performed. Health maintenance updated. Chart review completed.  Follow up scheduled with PCP  Note to schedule Nephrology sent to Dept    2. CKD " (chronic kidney disease) stage 3, GFR 30-59 ml/min  Chronic. Continue current regimen as instructed. Followed by Nephrology.  Last seen 11/2019.  Component      Latest Ref Rng & Units 5/14/2020             Creatinine      0.5 - 1.4 mg/dL 1.5 (H)     3. Type 2 diabetes mellitus with chronic kidney disease, with long-term current use of insulin, unspecified CKD stage  Chronic. Continue current regimen as instructed. Followed by Nephrology.  Last seen 11/2019.  Component      Latest Ref Rng & Units 5/14/2020             Creatinine      0.5 - 1.4 mg/dL 1.5 (H)     4. Type 2 diabetes mellitus with diabetic polyneuropathy, with long-term current use of insulin  Chronic. Stable with diabetic control. Followed by PCP and Podiatry.    5. Controlled type 2 diabetes mellitus with right eye affected by moderate nonproliferative retinopathy without macular edema, with long-term current use of insulin  Chronic. Continue current regimen as instructed. Followed by Ophthalmology.    6. Controlled type 2 diabetes mellitus with left eye affected by proliferative retinopathy without macular edema, with long-term current use of insulin  Chronic. Continue current regimen as instructed. Followed by Ophthalmology.  Component      Latest Ref Rng & Units 5/14/2020             Hemoglobin A1C External      4.0 - 5.6 % 6.9 (H)   Estimated Avg Glucose      68 - 131 mg/dL 151 (H)     7. Moderate nonproliferative diabetic retinopathy of both eyes without macular edema associated with type 2 diabetes mellitus  Chronic. Continue current regimen as instructed. Followed by Ophthalmology.  Component      Latest Ref Rng & Units 5/14/2020             Hemoglobin A1C External      4.0 - 5.6 % 6.9 (H)   Estimated Avg Glucose      68 - 131 mg/dL 151 (H)     8. Proliferative diabetic retinopathy of left eye without macular edema associated with type 2 diabetes mellitus  Chronic. Continue current regimen as instructed. Followed by Ophthalmology.  Component       Latest Ref Rng & Units 5/14/2020             Hemoglobin A1C External      4.0 - 5.6 % 6.9 (H)   Estimated Avg Glucose      68 - 131 mg/dL 151 (H)     9. Hyperlipidemia, unspecified hyperlipidemia type  Chronic. Stable. Followed by PCP    10. Hypertension, essential  Chronic. Stable. Followed by PCP    11. Myopia of both eyes with astigmatism and presbyopia  Chronic. Continue current regimen as instructed. Followed by Ophthalmology.    12. Nuclear sclerosis of right eye  Chronic. Continue current regimen as instructed. Followed by Ophthalmology.    13. BMI 30.0-30.9,adult  Noted.   Patient is active. He is walking/jogging 3 miles daily. He also does strength training.  Followed by PCP.    14. Colon cancer screening  - Case request GI: COLONOSCOPY      Provided Jamarcus with a 5-10 year written screening schedule and personal prevention plan. Recommendations were developed using the USPSTF age appropriate recommendations. Education, counseling, and referrals were provided as needed. After Visit Summary printed and given to patient which includes a list of additional screenings\tests needed.    Follow up for Annual Wellness Visit in 1 year, F/U with PCP as instructed, ;sooner if problems.    ISRAEL Billingsley         I offered to discuss end of life issues, including information on how to make advance directives that the patient could use to name someone who would make medical decisions on their behalf if they became too ill to make themselves.    ___Patient declined  _X_Patient is interested, I provided paper work and offered to discuss.

## 2020-06-19 NOTE — PATIENT INSTRUCTIONS
Counseling and Referral of Other Preventative  (Italic type indicates deductible and co-insurance are waived)    Patient Name: Jamarcus Mcqueen  Today's Date: 6/19/2020    Health Maintenance       Date Due Completion Date    TETANUS VACCINE 06/14/1969 ---    Shingles Vaccine (2 of 3) 09/18/2013 7/24/2013    Colorectal Cancer Screening 04/01/2020 4/1/2016    Influenza Vaccine (Season Ended) 09/01/2020 2/1/2019    Hemoglobin A1c 11/14/2020 5/14/2020    Override on 3/22/2018: Done    Lipid Panel 05/14/2021 5/14/2020    Eye Exam 05/21/2021 5/21/2020    Low Dose Statin 06/19/2021 6/19/2020    Foot Exam 06/19/2021 6/19/2020 (Done)    Override on 6/19/2020: Done (Podiatry)    Override on 5/14/2020: Done    Override on 3/22/2018: Done        No orders of the defined types were placed in this encounter.    The following information is provided to all patients.  This information is to help you find resources for any of the problems found today that may be affecting your health:                Living healthy guide: www.[facility].gov      Understanding Diabetes: www.diabetes.org      Eating healthy: www.cdc.gov/healthyweight      CDC home safety checklist: www.cdc.gov/steadi/patient.html      Agency on Aging: www.goea.[place].[facility]      Alcoholics anonymous (AA): www.aa.org      Physical Activity: www.any.nih.gov/cy2wntu      Tobacco use: www.quitwithusla.org

## 2020-06-19 NOTE — LETTER
June 22, 2020      Kt Stacy MD  [address]  [facility] [address]           [facility] - Podiatry  [address]  [facility] [address]  Phone: [phone]          Patient: Jamarcus Mcqueen   MR Number: [id number]   YOB: 1950   Date of Visit: 6/19/2020       Dear Dr. Kt Stacy:    Thank you for referring Jamarcus Mcqueen to me for evaluation. Attached you will find relevant portions of my assessment and plan of care.    If you have questions, please do not hesitate to call me. I look forward to following Jamarcus Mcqueen along with you.    Sincerely,    Nancy Quinn DPM    Enclosure  CC:  No Recipients    If you would like to receive this communication electronically, please contact [email or web address] or [phone] to request more information on [employer] Link access.    For providers and/or their staff who would like to refer a patient to [facility], please contact us through our one-stop-shop provider referral line, [facility] at [phone].    If you feel you have received this communication in error or would no longer like to receive these types of communications, please e-mail [email or web address]

## 2020-06-22 NOTE — PROGRESS NOTES
Subjective:      Patient ID: Jamarcus Mcqueen is a 70 y.o. male.    Chief Complaint: PCP (Kt Katz MD 5/14/20), Diabetic Foot Exam, and Nail Problem (nail fungus )    Jamarcus is a 70 y.o. male who presents to the clinic upon referral from Dr. Katz  for evaluation and treatment of diabetic feet. Jamarcus has a past medical history of Allergy, Cataract, Diabetes mellitus type II, Diabetic retinopathy, DM type 2 causing CKD stage 3, Fever blister, Glaucoma, Hand arthritis (5/14/2020), Hypertension, and Obesity (BMI 30-39.9) (6/4/2015). Patient relates no major problem with feet. Only complaints today consist of yearly comprehensive diabetic  foot examination  .    PCP: Kt Katz MD    Date Last Seen by PCP:   Chief Complaint   Patient presents with    PCP     Kt Katz MD 5/14/20    Diabetic Foot Exam    Nail Problem     nail fungus          Current shoe gear: Casual shoes    Hemoglobin A1C   Date Value Ref Range Status   05/14/2020 6.9 (H) 4.0 - 5.6 % Final     Comment:     ADA Screening Guidelines:  5.7-6.4%  Consistent with prediabetes  >or=6.5%  Consistent with diabetes  High levels of fetal hemoglobin interfere with the HbA1C  assay. Heterozygous hemoglobin variants (HbS, HgC, etc)do  not significantly interfere with this assay.   However, presence of multiple variants may affect accuracy.     11/01/2019 7.1 (H) 4.0 - 5.6 % Final     Comment:     ADA Screening Guidelines:  5.7-6.4%  Consistent with prediabetes  >or=6.5%  Consistent with diabetes  High levels of fetal hemoglobin interfere with the HbA1C  assay. Heterozygous hemoglobin variants (HbS, HgC, etc)do  not significantly interfere with this assay.   However, presence of multiple variants may affect accuracy.     01/25/2019 7.7 (H) 4.0 - 5.6 % Final     Comment:     ADA Screening Guidelines:  5.7-6.4%  Consistent with prediabetes  >or=6.5%  Consistent with diabetes  High levels of fetal hemoglobin interfere with the  HbA1C  assay. Heterozygous hemoglobin variants (HbS, HgC, etc)do  not significantly interfere with this assay.   However, presence of multiple variants may affect accuracy.     01/25/2019 7.7 (H) 4.0 - 5.6 % Final     Comment:     ADA Screening Guidelines:  5.7-6.4%  Consistent with prediabetes  >or=6.5%  Consistent with diabetes  High levels of fetal hemoglobin interfere with the HbA1C  assay. Heterozygous hemoglobin variants (HbS, HgC, etc)do  not significantly interfere with this assay.   However, presence of multiple variants may affect accuracy.             Review of Systems   Constitution: Negative for chills, decreased appetite and fever.   Cardiovascular: Negative for leg swelling.   Musculoskeletal: Negative for arthritis, joint pain, joint swelling and myalgias.   Gastrointestinal: Negative for nausea and vomiting.   Neurological: Negative for loss of balance, numbness and paresthesias.         Patient Active Problem List   Diagnosis    CKD (chronic kidney disease) stage 3, GFR 30-59 ml/min    Nuclear sclerosis - Both Eyes    Myopia with astigmatism and presbyopia - Both Eyes    Primary open angle glaucoma, left eye    Primary open angle glaucoma, right eye    Vitreous hemorrhage, left eye    Overweight (BMI 25.0-29.9)    Hypertension, essential    Hyperlipidemia    Depression    Colon polyps    Type 2 diabetes mellitus with diabetic chronic kidney disease    Type 2 diabetes mellitus with diabetic polyneuropathy    Senile nuclear sclerosis    Moderate nonproliferative diabetic retinopathy of both eyes without macular edema associated with type 2 diabetes mellitus    Proliferative diabetic retinopathy of left eye without macular edema associated with type 2 diabetes mellitus    Controlled type 2 diabetes mellitus with left eye affected by proliferative retinopathy without macular edema, with long-term current use of insulin    Controlled type 2 diabetes mellitus with right eye affected by  moderate nonproliferative retinopathy without macular edema, with long-term current use of insulin    Meralgia paresthetica    Hand arthritis       Current Outpatient Medications on File Prior to Visit   Medication Sig Dispense Refill    ezetimibe (ZETIA) 10 mg tablet TAKE 1 TABLET DAILY (Patient not taking: Reported on 6/19/2020) 90 tablet 0    insulin (LANTUS SOLOSTAR U-100 INSULIN) glargine 100 units/mL (3mL) SubQ pen Inject 30 Units into the skin every evening. 30 mL 0    insulin lispro (HUMALOG KWIKPEN INSULIN) 100 unit/mL pen Inject 20 Units into the skin before meals as needed. 6 Box 0    latanoprost 0.005 % ophthalmic solution Place 1 drop into both eyes every evening. 7.5 mL 3    losartan (COZAAR) 25 MG tablet TAKE 1 TABLET DAILY 90 tablet 0    simvastatin (ZOCOR) 40 MG tablet TAKE 1 TABLET EVERY EVENING 90 tablet 0    timolol maleate 0.5% (TIMOPTIC-XE) 0.5 % SolG INSTILL 1 DROP IN BOTH EYES ONCE DAILY 15 mL 4    traZODone (DESYREL) 50 MG tablet TAKE 1 TABLET NIGHTLY AS NEEDED FOR INSOMNIA 90 tablet 0    triamcinolone acetonide 0.1% (KENALOG) 0.1 % cream Apply topically 2 (two) times daily. 80 g 1    vardenafiL (LEVITRA) 20 MG tablet Take 1 tablet (20 mg total) by mouth daily as needed for Erectile Dysfunction. 30 tablet 1     No current facility-administered medications on file prior to visit.        Review of patient's allergies indicates:   Allergen Reactions    Lipitor [atorvastatin]      Myalgia    Sulfa (sulfonamide antibiotics) Itching       Past Surgical History:   Procedure Laterality Date    Avastin Injection Left 10/09/2014    Dr. Walter    CATARACT EXTRACTION W/  INTRAOCULAR LENS IMPLANT Left 05/18/2016        CATARACT EXTRACTION W/  INTRAOCULAR LENS IMPLANT Right 09/20/2017        COLONOSCOPY N/A 4/1/2016    Procedure: COLONOSCOPY;  Surgeon: Juan Pablo Pascual MD;  Location: [address]);  Service: Endoscopy;  Laterality: N/A;  Do not cancel  this order    EYE SURGERY      HEMORRHOID SURGERY      REFRACTIVE SURGERY   and     TONSILLECTOMY         Family History   Problem Relation Age of Onset    Diabetes Mother     Hyperlipidemia Mother     Glaucoma Mother     No Known Problems Father     Diabetes Sister     No Known Problems Maternal Aunt     No Known Problems Maternal Uncle     No Known Problems Paternal Aunt     No Known Problems Paternal Uncle     No Known Problems Maternal Grandmother     No Known Problems Maternal Grandfather     No Known Problems Paternal Grandmother     No Known Problems Paternal Grandfather     No Known Problems Daughter     No Known Problems Son     Diabetes Sister         complications    No Known Problems Son     No Known Problems Son     No Known Problems Son     Diabetes Daughter     Amblyopia Neg Hx     Blindness Neg Hx     Cataracts Neg Hx     Macular degeneration Neg Hx     Retinal detachment Neg Hx     Strabismus Neg Hx     Melanoma Neg Hx     Cancer Neg Hx     Hypertension Neg Hx     Stroke Neg Hx     Thyroid disease Neg Hx        Social History     Socioeconomic History    Marital status:      Spouse name: Not on file    Number of children: Not on file    Years of education: Not on file    Highest education level: Not on file   Occupational History    Occupation:    Social Needs    Financial resource strain: Not on file    Food insecurity     Worry: Not on file     Inability: Not on file    Transportation needs     Medical: Not on file     Non-medical: Not on file   Tobacco Use    Smoking status: Former Smoker     Quit date: 1984     Years since quittin.4    Smokeless tobacco: Never Used   Substance and Sexual Activity    Alcohol use: No     Alcohol/week: 0.0 standard drinks    Drug use: No    Sexual activity: Yes     Partners: Female   Lifestyle    Physical activity     Days per week: Not on file     Minutes per session: Not on file  "   Stress: Not on file   Relationships    Social connections     Talks on phone: Not on file     Gets together: Not on file     Attends Taoist service: Not on file     Active member of club or organization: Not on file     Attends meetings of clubs or organizations: Not on file     Relationship status: Not on file   Other Topics Concern    Not on file   Social History Narrative    Not on file               Objective:       Vitals:    06/19/20 1119   BP: 107/65   Pulse: 64   Resp: 18   Weight: 106.6 kg (235 lb)   Height: 5' 11" (1.803 m)   PainSc: 0-No pain        Physical Exam  Vitals signs and nursing note reviewed.   Constitutional:       General: He is not in acute distress.     Appearance: He is well-developed. He is not toxic-appearing or diaphoretic.      Comments: alert and oriented x 3.    Cardiovascular:      Pulses:           Dorsalis pedis pulses are 2+ on the right side and 2+ on the left side.        Posterior tibial pulses are 2+ on the right side and 2+ on the left side.      Comments:  Capillary refill time is within normal limits. Digital hair present.   Pulmonary:      Effort: No respiratory distress.   Musculoskeletal:         General: No deformity.      Right ankle: No tenderness. No lateral malleolus, no medial malleolus, no AITFL, no CF ligament and no posterior TFL tenderness found. Achilles tendon exhibits no pain, no defect and normal Abad's test results.      Left ankle: No tenderness. No lateral malleolus, no medial malleolus, no AITFL, no CF ligament and no posterior TFL tenderness found. Achilles tendon exhibits no pain, no defect and normal Abad's test results.      Right foot: No tenderness or bony tenderness.      Left foot: No tenderness or bony tenderness.      Comments: Adequate joint range of motion without pain, limitation, nor crepitation Bilateral feet and ankle joints. Muscle strength is 5/5 in all groups bilaterally.           Feet:      Right foot:      " Protective Sensation: 5 sites tested. 5 sites sensed.      Left foot:      Protective Sensation: 5 sites tested. 5 sites sensed.   Lymphadenopathy:      Comments: No lymphatic streaking     Skin:     General: Skin is warm and dry.      Coloration: Skin is not pale.      Findings: No rash.      Nails: There is no clubbing.        Comments: Skin is of normal turgor.   Normal temperature gradient.  Examination of the skin reveals no evidence of significant rashes, open lesions, suspicious appearing nevi or other concerning lesions.          Scaling dryness in a moccasin distribution is noted to the bilateral lower extremities with associated erythema.       Neurological:      Sensory: No sensory deficit.      Motor: No atrophy.      Comments: Light touch present     Psychiatric:         Attention and Perception: He is attentive.         Mood and Affect: Mood is not anxious. Affect is not inappropriate.         Speech: He is communicative. Speech is not slurred.         Behavior: Behavior is not combative.               Assessment:       Encounter Diagnoses   Name Primary?    Type 2 diabetes mellitus with diabetic polyneuropathy, with long-term current use of insulin     Tinea pedis of both feet Yes         Plan:       Jamarcus was seen today for pcp, diabetic foot exam and nail problem.    Diagnoses and all orders for this visit:    Tinea pedis of both feet    Type 2 diabetes mellitus with diabetic polyneuropathy, with long-term current use of insulin  -     Ambulatory referral/consult to Podiatry    Other orders  -     ketoconazole (NIZORAL) 2 % cream; Apply topically once daily.      I counseled the patient on his conditions, their implications and medical management.      - Shoe inspection. Diabetic Foot Education. Patient reminded of the importance of good nutrition and blood sugar control to help prevent podiatric complications of diabetes. Patient instructed on proper foot hygeine. We discussed wearing proper  shoe gear, daily foot inspections, never walking without protective shoe gear, caution putting sharp instruments to feet     - Discussed DM foot care:  Wear comfortable, proper fitting shoes. Wash feet daily. Dry well. After drying, apply moisturizer to feet (no lotion to webspaces). Inspect feet daily for skin breaks, blisters, swelling, or redness. Wear cotton socks (preferably white)  Change socks every day. Do NOT walk barefoot. Do NOT use heating pads or warm/hot water soaks     - Discussed importance of daily moisturizer to the feet such as Gold bonds diabetic foot cream    - Patient is low risk for developing lower extremity issues secondary to diabetes. I recommend continued yearly diabetic foot examinations.     - Patients PCP can perform yearly foot checks . Currently  patient has no pedal manifestations of DM    - Patients with out pedal manifestations of DM, do not qualify for nail/callus trimming     - Advised the patient that fungus likes warm, dark, and moist environments such as bathrooms, gyms, and pools. Advised to spray shower, shower mat , and any shoes w/ Lysol periodically    - RTC in  PRN     .

## 2020-06-30 ENCOUNTER — TELEPHONE (OUTPATIENT)
Dept: INTERNAL MEDICINE | Facility: CLINIC | Age: 70
End: 2020-06-30

## 2020-06-30 DIAGNOSIS — Z12.11 COLON CANCER SCREENING: Primary | ICD-10-CM

## 2020-06-30 DIAGNOSIS — K63.5 POLYP OF COLON, UNSPECIFIED PART OF COLON, UNSPECIFIED TYPE: ICD-10-CM

## 2020-07-01 NOTE — TELEPHONE ENCOUNTER
Patient is due for colon cancer screening or a colonoscopy.     The order we previously placed  since patient never scheduled. I placed another order for colonoscopy.    Please call and notify patient. Please advise that the colonoscopy department should constact them to schedule, but if they are not contacted in 1-2 weeks to call the department.    Please provide the colonoscopy scheduling number is [phone].    Thank you.

## 2020-07-01 NOTE — TELEPHONE ENCOUNTER
"----- Message from Mable Cadena RN sent at 6/19/2020  2:10 PM CDT -----  Regarding: Cancellation of Order # [id number]  Order number [id number] for the procedure [facility] [GI5]   has been canceled by Mable Cadena RN [[id number]]. This procedure   was ordered by Kt Stacy MD [[id number]] on May 14, 2020   for the patient Jamarcus Mcqueen [[id number]]. The reason for   cancellation was "None".  "

## 2020-07-06 NOTE — TELEPHONE ENCOUNTER
MANI from PCP for pt to return a call to us to schedule labs and call [phone] to schedule his Colonoscopy.  Spoke to  to give the same message because pt is not home at this time.

## 2020-08-19 DIAGNOSIS — Z01.818 PRE-OP TESTING: ICD-10-CM

## 2020-08-19 DIAGNOSIS — Z12.11 SPECIAL SCREENING FOR MALIGNANT NEOPLASMS, COLON: Primary | ICD-10-CM

## 2020-08-19 RX ORDER — POLYETHYLENE GLYCOL 3350, SODIUM SULFATE ANHYDROUS, SODIUM BICARBONATE, SODIUM CHLORIDE, POTASSIUM CHLORIDE 236; 22.74; 6.74; 5.86; 2.97 G/4L; G/4L; G/4L; G/4L; G/4L
4 POWDER, FOR SOLUTION ORAL ONCE
Qty: 4000 ML | Refills: 0 | Status: SHIPPED | OUTPATIENT
Start: 2020-08-19 | End: 2020-08-19

## 2020-08-21 ENCOUNTER — TELEPHONE (OUTPATIENT)
Dept: NEPHROLOGY | Facility: CLINIC | Age: 70
End: 2020-08-21

## 2020-08-21 NOTE — TELEPHONE ENCOUNTER
Spoke to pt and stated that he would like to be scheduled when Sep schedule opens   ----- Message from Priya De Leon sent at 8/21/2020  3:09 PM CDT -----  Regarding: PT  Contact: PT  PT has an appointment on 8.28th that he would like to reschedule. I tried to reschedule him but only 8/26 dates I see available is 8/28. Please call back     Callback: [phone]

## 2020-08-27 ENCOUNTER — PATIENT OUTREACH (OUTPATIENT)
Dept: ADMINISTRATIVE | Facility: OTHER | Age: 70
End: 2020-08-27

## 2020-08-27 NOTE — PROGRESS NOTES
Care Everywhere: updated  Immunization: updated  Health Maintenance: updated  Media Review:   Legacy Review:   Order placed:   Upcoming appts:colonoscopy 9/21/2020

## 2020-09-13 DIAGNOSIS — I10 HYPERTENSION, ESSENTIAL: ICD-10-CM

## 2020-09-13 NOTE — TELEPHONE ENCOUNTER
Care Due:                  Date            Visit Type   Department     Provider  --------------------------------------------------------------------------------                                ESTABLISHED   [facility] INTERNAL  Last Visit: 05-      PATIENT      MEDICINE       BARTOLOME FREY                              ESTABLISHED   [facility] INTERNAL  Next Visit: 11-      PATIENT      MEDICINE       BARTOLOME FREY                                                            Last  Test          Frequency    Reason                     Performed    Due Date  --------------------------------------------------------------------------------    HBA1C.......  6 months...  insulin..................  05-   11-    Powered by [employer]. Reference number: [id number]. 9/13/2020 5:05:25 AM CDT

## 2020-09-17 RX ORDER — LOSARTAN POTASSIUM 25 MG/1
25 TABLET ORAL DAILY
Qty: 90 TABLET | Refills: 0 | Status: SHIPPED | OUTPATIENT
Start: 2020-09-17 | End: 2020-12-14

## 2020-09-18 ENCOUNTER — LAB VISIT (OUTPATIENT)
Dept: FAMILY MEDICINE | Facility: CLINIC | Age: 70
End: 2020-09-18
Payer: MEDICARE

## 2020-09-18 DIAGNOSIS — Z01.818 PRE-OP TESTING: ICD-10-CM

## 2020-09-18 PROCEDURE — U0003 INFECTIOUS AGENT DETECTION BY NUCLEIC ACID (DNA OR RNA); SEVERE ACUTE RESPIRATORY SYNDROME CORONAVIRUS 2 (SARS-COV-2) (CORONAVIRUS DISEASE [COVID-19]), AMPLIFIED PROBE TECHNIQUE, MAKING USE OF HIGH THROUGHPUT TECHNOLOGIES AS DESCRIBED BY CMS-2020-01-R: HCPCS

## 2020-09-18 NOTE — TELEPHONE ENCOUNTER
Provider Staff:     Action is required for this patient.     Please schedule patient for the following     Labs:  - A1c     Thanks!  [facility]      Appointments past 12m or future 3m with pcp    Date Provider   Last Visit   5/14/2020 Kt Stacy MD   Next Visit   11/16/2020 Kt Stacy MD     Note composed: 09/18/2020 9:03 AM

## 2020-09-19 LAB — SARS-COV-2 RNA RESP QL NAA+PROBE: NOT DETECTED

## 2020-09-20 ENCOUNTER — NURSE TRIAGE (OUTPATIENT)
Dept: ADMINISTRATIVE | Facility: CLINIC | Age: 70
End: 2020-09-20

## 2020-09-20 NOTE — TELEPHONE ENCOUNTER
Spoke with patient he states he does not know what time his colonoscopy procedure is set for on tomorrow. Spoke with Nilda (on call endoscopy nurse) whom states patient is scheduled for procedure tomorrow at 10:20 am.  States that patient should be there at 9 am, 4 th floor gold elevators in the clinic tower. Patient given information and verbalized understanding.     Reason for Disposition   Question about upcoming scheduled test, no triage required and triager able to answer question    Protocols used: INFORMATION ONLY CALL-A-

## 2020-09-21 ENCOUNTER — ANESTHESIA EVENT (OUTPATIENT)
Dept: ENDOSCOPY | Facility: HOSPITAL | Age: 70
End: 2020-09-21
Payer: MEDICARE

## 2020-09-21 ENCOUNTER — ANESTHESIA (OUTPATIENT)
Dept: ENDOSCOPY | Facility: HOSPITAL | Age: 70
End: 2020-09-21
Payer: MEDICARE

## 2020-09-21 ENCOUNTER — HOSPITAL ENCOUNTER (OUTPATIENT)
Facility: HOSPITAL | Age: 70
Discharge: HOME | End: 2020-09-21
Attending: COLON & RECTAL SURGERY | Admitting: COLON & RECTAL SURGERY
Payer: MEDICARE

## 2020-09-21 VITALS
HEIGHT: 73 IN | OXYGEN SATURATION: 98 % | DIASTOLIC BLOOD PRESSURE: 67 MMHG | SYSTOLIC BLOOD PRESSURE: 118 MMHG | HEART RATE: 85 BPM | RESPIRATION RATE: 14 BRPM | BODY MASS INDEX: 31.14 KG/M2 | TEMPERATURE: 98 F | WEIGHT: 235 LBS

## 2020-09-21 DIAGNOSIS — Z12.11 SCREENING FOR COLON CANCER: Primary | ICD-10-CM

## 2020-09-21 LAB — POCT GLUCOSE: 118 MG/DL (ref 70–110)

## 2020-09-21 PROCEDURE — 88305 TISSUE EXAM BY PATHOLOGIST: CPT | Mod: 26,,, | Performed by: PATHOLOGY

## 2020-09-21 PROCEDURE — 88305 TISSUE EXAM BY PATHOLOGIST: CPT | Performed by: PATHOLOGY

## 2020-09-21 PROCEDURE — 37000008 HC ANESTHESIA 1ST 15 MINUTES: Performed by: COLON & RECTAL SURGERY

## 2020-09-21 PROCEDURE — 27201089 HC SNARE, DISP (ANY): Performed by: COLON & RECTAL SURGERY

## 2020-09-21 PROCEDURE — 63600175 PHARM REV CODE 636 W HCPCS: Performed by: NURSE ANESTHETIST, CERTIFIED REGISTERED

## 2020-09-21 PROCEDURE — 25000003 PHARM REV CODE 250: Performed by: COLON & RECTAL SURGERY

## 2020-09-21 PROCEDURE — 45385 COLONOSCOPY W/LESION REMOVAL: CPT | Mod: PT,,, | Performed by: COLON & RECTAL SURGERY

## 2020-09-21 PROCEDURE — 45385 PR COLONOSCOPY,REMV LESN,SNARE: ICD-10-PCS | Mod: PT,,, | Performed by: COLON & RECTAL SURGERY

## 2020-09-21 PROCEDURE — 45385 COLONOSCOPY W/LESION REMOVAL: CPT | Performed by: COLON & RECTAL SURGERY

## 2020-09-21 PROCEDURE — 88305 TISSUE EXAM BY PATHOLOGIST: ICD-10-PCS | Mod: 26,,, | Performed by: PATHOLOGY

## 2020-09-21 PROCEDURE — E9220 PRA ENDO ANESTHESIA: ICD-10-PCS | Mod: PT,,, | Performed by: NURSE ANESTHETIST, CERTIFIED REGISTERED

## 2020-09-21 PROCEDURE — 37000009 HC ANESTHESIA EA ADD 15 MINS: Performed by: COLON & RECTAL SURGERY

## 2020-09-21 PROCEDURE — E9220 PRA ENDO ANESTHESIA: HCPCS | Mod: PT,,, | Performed by: NURSE ANESTHETIST, CERTIFIED REGISTERED

## 2020-09-21 RX ORDER — LIDOCAINE HYDROCHLORIDE 20 MG/ML
INJECTION INTRAVENOUS
Status: DISCONTINUED | OUTPATIENT
Start: 2020-09-21 | End: 2020-09-21

## 2020-09-21 RX ORDER — SODIUM CHLORIDE 9 MG/ML
INJECTION, SOLUTION INTRAVENOUS CONTINUOUS
Status: DISCONTINUED | OUTPATIENT
Start: 2020-09-21 | End: 2020-09-21 | Stop reason: HOSPADM

## 2020-09-21 RX ORDER — PROPOFOL 10 MG/ML
VIAL (ML) INTRAVENOUS
Status: DISCONTINUED | OUTPATIENT
Start: 2020-09-21 | End: 2020-09-21

## 2020-09-21 RX ORDER — PROPOFOL 10 MG/ML
VIAL (ML) INTRAVENOUS CONTINUOUS PRN
Status: DISCONTINUED | OUTPATIENT
Start: 2020-09-21 | End: 2020-09-21

## 2020-09-21 RX ADMIN — SODIUM CHLORIDE: 0.9 INJECTION, SOLUTION INTRAVENOUS at 11:09

## 2020-09-21 RX ADMIN — PROPOFOL 20 MG: 10 INJECTION, EMULSION INTRAVENOUS at 11:09

## 2020-09-21 RX ADMIN — PROPOFOL 80 MG: 10 INJECTION, EMULSION INTRAVENOUS at 11:09

## 2020-09-21 RX ADMIN — SODIUM CHLORIDE: 0.9 INJECTION, SOLUTION INTRAVENOUS at 09:09

## 2020-09-21 RX ADMIN — SODIUM CHLORIDE: 0.9 INJECTION, SOLUTION INTRAVENOUS at 10:09

## 2020-09-21 RX ADMIN — PROPOFOL 150 MCG/KG/MIN: 10 INJECTION, EMULSION INTRAVENOUS at 11:09

## 2020-09-21 RX ADMIN — LIDOCAINE HYDROCHLORIDE 100 MG: 20 INJECTION, SOLUTION INTRAVENOUS at 11:09

## 2020-09-21 NOTE — ANESTHESIA PREPROCEDURE EVALUATION
09/21/2020  Jamarcus Mcqueen is a 70 y.o., male.  Past Medical History:   Diagnosis Date    Allergy     Cataract     Diabetes mellitus type II     Diabetic retinopathy     DM type 2 causing CKD stage 3     Fever blister     Glaucoma     Hand arthritis 5/14/2020    Hypertension     Obesity (BMI 30-39.9) 6/4/2015     Past Surgical History:   Procedure Laterality Date    Avastin Injection Left 10/09/2014    Dr. Walter    CATARACT EXTRACTION W/  INTRAOCULAR LENS IMPLANT Left 05/18/2016        CATARACT EXTRACTION W/  INTRAOCULAR LENS IMPLANT Right 09/20/2017        COLONOSCOPY N/A 4/1/2016    Procedure: COLONOSCOPY;  Surgeon: Juan Pablo Pascual MD;  Location: [facility] ([address] [place] [address]);  Service: Endoscopy;  Laterality: N/A;  Do not cancel this order    EYE SURGERY      HEMORRHOID SURGERY      REFRACTIVE SURGERY  2014 and 2015    TONSILLECTOMY         Anesthesia Evaluation    I have reviewed the Patient Summary Reports.      I have reviewed the Medications.     Review of Systems  Anesthesia Hx:   Denies Personal Hx of Anesthesia complications.   Hematology/Oncology:  Hematology Normal   Oncology Normal     EENT/Dental:EENT/Dental Normal   Cardiovascular:   Hypertension    Pulmonary:  Pulmonary Normal    Renal/:   Chronic Renal Disease    Hepatic/GI:  Hepatic/GI Normal    Musculoskeletal:   Arthritis     Neurological:   Neuromuscular Disease,    Endocrine:   Diabetes, type 2    Dermatological:  Skin Normal    Psych:   Psychiatric History          Physical Exam  General:  Obesity    Airway/Jaw/Neck:  AIRWAY FINDINGS: Normal      Eyes/Ears/Nose:  EYES/EARS/NOSE FINDINGS: Normal   Dental:  DENTAL FINDINGS: Normal   Chest/Lungs:  Chest/Lungs Clear    Heart/Vascular:  Heart Findings: Normal Heart murmur: negative Vascular Findings: Normal    Abdomen:  Abdomen Findings: Normal     Musculoskeletal:  Musculoskeletal Findings: Normal   Skin:  Skin Findings: Normal    Mental Status:  Mental Status Findings: Normal        Anesthesia Plan  Type of Anesthesia, risks & benefits discussed:  Anesthesia Type:  general  Patient's Preference: General  Intra-op Monitoring Plan: standard ASA monitors  Intra-op Monitoring Plan Comments:   Post Op Pain Control Plan:   Post Op Pain Control Plan Comments:   Induction:   IV  Beta Blocker:  Patient is not currently on a Beta-Blocker (No further documentation required).       Informed Consent: Patient understands risks and agrees with Anesthesia plan.  Questions answered. Anesthesia consent signed with patient.  ASA Score: 2     Day of Surgery Review of History & Physical:    H&P update referred to the surgeon.         Ready For Surgery From Anesthesia Perspective.

## 2020-09-21 NOTE — TRANSFER OF CARE
"Anesthesia Transfer of Care Note    Patient: Jamarcus Mcqueen    Procedure(s) Performed: Procedure(s) (LRB):  COLONOSCOPY (N/A)    Patient location: PACU    Anesthesia Type: general    Transport from OR: Transported from OR on room air with adequate spontaneous ventilation    Post pain: adequate analgesia    Post assessment: no apparent anesthetic complications and tolerated procedure well    Post vital signs: stable    Level of consciousness: sedated    Nausea/Vomiting: no nausea/vomiting    Complications: none    Transfer of care protocol was followed      Last vitals:   Visit Vitals  /67 (BP Location: Left arm, Patient Position: Sitting)   Pulse 85   Temp 36.5 °C (97.7 °F) (Temporal)   Resp 16   Ht 6' 1" (1.854 m)   Wt 106.6 kg (235 lb)   SpO2 98%   BMI 31.00 kg/m²     "

## 2020-09-21 NOTE — DISCHARGE INSTRUCTIONS
Colorectal Cancer Screening    Colorectal cancer (cancer in the colon or rectum) is a leading cause of cancer deaths in the [place] But it doesnt have to be. When this cancer is found and removed early, the chances of a full recovery are very good. Because colorectal cancer rarely causes symptoms in its early stages, screening for the disease is important. Its even more crucial if you have risk factors for the disease. Learn more about colorectal cancer and its risk factors. Then talk to your healthcare provider about being screened. You could be saving your own life.  Risk factors for colorectal cancer  Your risk of having colorectal cancer increases if you:  · Are 50 years of age or older  · Have a family history or personal history of colorectal cancer or polyps  · Have a personal history of type 2 diabetes, Crohns disease, or ulcerative colitis  · Have an inherited genetic syndrome like Cooper syndrome (also known as HNPCC) or familial adenomatous polyposis (FAP)  · Are very overweight  · Are not physically active  · Smoke  · Drink a lot of alcohol  · Eat a lot of red or processed meat  The colon and rectum  Waste from food you eat enters the colon from the small intestine. As it travels through the colon, the waste (stool) loses water and becomes more solid. Intestinal muscles push it toward the sigmoid--the last section of the colon. Stool then moves into the rectum, where its stored until its ready to leave the body during a bowel movement.  How cancer develops  Polyps are growths that form on the inner lining of the colon or rectum. Most are benign, which means they arent cancerous. But over time, some polyps can become cancer (malignant). This happens when cells in these polyps begin growing abnormally. In time, malignant cells invade more and more of the colon and rectum. The cancer may also spread to nearby organs or lymph nodes or to other parts of the body. Finding and removing polyps can help  prevent cancer from ever forming.  Your screening  Screening means looking for a health problem before you have symptoms. During screening for colorectal cancer, your healthcare provider will ask about your health history, examine you, and do one or more tests.  History and exam  The history and exam involve the following:  · Health history. Your healthcare provider will ask about your health history. Mention if a family member has had colon cancer or polyps. Also mention any health problems you have had in the past.  · Digital rectal exam (HARNIDER). During a HARINDER, the healthcare provider inserts a lubricated gloved finger into the rectum. The test is painless and takes less than a minute. Healthcare providers agree that this test alone is not enough to screen for colorectal cancer.  Screening test choices  Fecal occult blood test (FOBT) or fecal immunochemical test (FIT)  These tests check for occult blood in stool (blood you cant see). Hidden blood may be a sign of colon polyps or cancer. A small sample of stool is tested for blood in a laboratory. Most often, you collect this sample at home using a kit your healthcare provider gives you. Follow the instructions carefully for using this kit. You might need to avoid certain foods and medicines before the test, as directed.  Barium enema with contrast (double-contrast barium enema)  This test uses X-rays to provide images of the entire colon and rectum. The day before this test, you will need to do a bowel prep to clean out the colon and rectum. A bowel prep is a liquid diet plus strong laxatives or enemas. You will be awake for the test, but you may be given medicine to help you relax. At the start of the test, a radiologist (a healthcare provider who specializes in imaging tests) places a soft tube into the rectum. The tube is used to fill the colon with a contrast liquid (barium) and air. This can be uncomfortable for some people. The liquid helps the colon show up  clearly on the X-rays. Because the test uses X-rays, it exposes you to a small amount of radiation.  Virtual colonoscopy  This exam is also called a CT colonography. It uses a series of X-ray photographs to create a 3-D view of the colon and rectum. The day before the test, you will need to do a bowel prep to clean out your colon. Your healthcare provider will give you instructions on how to do this. During the procedure, you will lie on a table that is part of a special X-ray machine called a CT scanner. A small tube will be placed into your rectum to fill the colon and rectum with air. This can be uncomfortable for some people. Then, the table will move into the machine and pictures will be taken of your colon and rectum. A computer will combine these photos to create a 3-D picture. Because the test uses X-rays, it exposes you to a small amount of radiation.  Scope exams  Here are two types of scope exams:  · Colonoscopy. This test can be used to find and remove polyps anywhere in the colon or rectum. The day before the test, you will do a bowel prep. This is a liquid diet plus a strong laxative solution or an enema. The bowel prep will cleanse your colon. You will be given instructions for this. Just before the test, you are given a medicine to make you sleepy. Then, a long, flexible, lighted tube called a colonoscope is gently inserted into the rectum and guided through the entire colon. Images of the colon are viewed on a video screen. Any polyps that are found are removed and sent to a lab for testing. If a polyp cant be removed, a sample of tissue is taken and the polyp might be removed later during surgery. You will need to bring someone with you to drive you home after this test.   · Sigmoidoscopy. This test is similar to colonoscopy, but focuses only on the sigmoid colon and rectum. As with colonoscopy, bowel prep must be done the day before this test. It might not need to be as complete as the bowel prep  for a colonoscopy. You are awake during the procedure, but you may be given medicine to help you relax. During the test, the healthcare provider guides a thin, flexible, lighted tube called a sigmoidoscope through your rectum and lower colon. The images are displayed on a video screen. Polyps are removed, if possible, and sent to a lab for testing.  Colonoscopy is the only screening test that lets your healthcare provider see the entire colon and rectum. This test also lets your healthcare provider remove any pieces of tissue that need to be looked at by a lab. If something suspicious is found using any other tests, you will likely need a colonoscopy.     When to call your healthcare provider after a test  Call your healthcare provider if you have any of the following after any screening test:  · Bleeding  · Fever of 100.4°F (38°C) or higher, or as directed by your healthcare provider  · Abdominal pain  · Vomiting   Date Last Reviewed: 11/4/2015  © 7683-6413 [employer]. [address] [place] [address], [place] [address]. All rights reserved. This information is not intended as a substitute for professional medical care. Always follow your healthcare professional's instructions.

## 2020-09-21 NOTE — [employer] PATIENT INSTRUCTIONS
Discharge Summary/Instructions after an Endoscopic Procedure  Patient Name: Jamarcus Mcqueen  Patient MRN: [id number]  Patient YOB: 1950 Monday, September 21, 2020  Nehemias Armas MD  RESTRICTIONS:  During your procedure today, you received medications for sedation.  These   medications may affect your judgment, balance and coordination.  Therefore,   for 24 hours, you have the following restrictions:   - DO NOT drive a car, operate machinery, make legal/financial decisions,   sign important papers or drink alcohol.    ACTIVITY:  Today: no heavy lifting, straining or running due to procedural   sedation/anesthesia.  The following day: return to full activity including work.  DIET:  Eat and drink normally unless instructed otherwise.     TREATMENT FOR COMMON SIDE EFFECTS:  - Mild abdominal pain, nausea, belching, bloating or excessive gas:  rest,   eat lightly and use a heating pad.  - Sore Throat: treat with throat lozenges and/or gargle with warm salt   water.  - Because air was used during the procedure, expelling large amounts of air   from your rectum or belching is normal.  - If a bowel prep was taken, you may not have a bowel movement for 1-3 days.    This is normal.  SYMPTOMS TO WATCH FOR AND REPORT TO YOUR PHYSICIAN:  1. Abdominal pain or bloating, other than gas cramps.  2. Chest pain.  3. Back pain.  4. Signs of infection such as: chills or fever occurring within 24 hours   after the procedure.  5. Rectal bleeding, which would show as bright red, maroon, or black stools.   (A tablespoon of blood from the rectum is not serious, especially if   hemorrhoids are present.)  6. Vomiting.  7. Weakness or dizziness.  GO DIRECTLY TO THE NEAREST EMERGENCY ROOM IF YOU HAVE ANY OF THE FOLLOWING:      Difficulty breathing              Chills and/or fever over 101 F   Persistent vomiting and/or vomiting blood   Severe abdominal pain   Severe chest pain   Black, tarry stools   Bleeding- more than one  tablespoon   Any other symptom or condition that you feel may need urgent attention  Your doctor recommends these additional instructions:  If any biopsies were taken, your doctors clinic will contact you in 1 to 2   weeks with any results.  - Patient has a contact number available for emergencies.  The signs and   symptoms of potential delayed complications were discussed with the   patient.  Return to normal activities tomorrow.  Written discharge   instructions were provided to the patient.   - Discharge patient to home (ambulatory).   - Resume regular diet indefinitely.   - Repeat colonoscopy in 3 - 5 years for surveillance.   - Continue present medications.  For questions, problems or results please call your physician - Nehemias Armas MD at Work:  [phone].  [facility], EMERGENCY ROOM PHONE NUMBER: [phone]  IF A COMPLICATION OR EMERGENCY SITUATION ARISES AND YOU ARE UNABLE TO REACH   YOUR PHYSICIAN - GO DIRECTLY TO THE EMERGENCY ROOM.  Nehemias Armas MD  9/21/2020 11:50:34 AM  This report has been verified and signed electronically.  [employer]

## 2020-09-21 NOTE — H&P
COLONOSCOPY HISTORY AND PHYSICAL EXAM    Procedure : Colonoscopy      INDICATIONS: asymptomatic screening exam and family history of colon polyps    Family Hx of CRC: None    Last Colonoscopy:  4/1/16   Findings: 4mm transverse colon polyp, 8mm ascending colon polyp. Both adenomatous       Past Medical History:   Diagnosis Date    Allergy     Cataract     Diabetes mellitus type II     Diabetic retinopathy     DM type 2 causing CKD stage 3     Fever blister     Glaucoma     Hand arthritis 5/14/2020    Hypertension     Obesity (BMI 30-39.9) 6/4/2015     Sedation Problems: NO  Family History   Problem Relation Age of Onset    Diabetes Mother     Hyperlipidemia Mother     Glaucoma Mother     No Known Problems Father     Diabetes Sister     No Known Problems Maternal Aunt     No Known Problems Maternal Uncle     No Known Problems Paternal Aunt     No Known Problems Paternal Uncle     No Known Problems Maternal Grandmother     No Known Problems Maternal Grandfather     No Known Problems Paternal Grandmother     No Known Problems Paternal Grandfather     No Known Problems Daughter     No Known Problems Son     Diabetes Sister         complications    No Known Problems Son     No Known Problems Son     No Known Problems Son     Diabetes Daughter     Amblyopia Neg Hx     Blindness Neg Hx     Cataracts Neg Hx     Macular degeneration Neg Hx     Retinal detachment Neg Hx     Strabismus Neg Hx     Melanoma Neg Hx     Cancer Neg Hx     Hypertension Neg Hx     Stroke Neg Hx     Thyroid disease Neg Hx      Fam Hx of Sedation Problems: NO  Social History     Socioeconomic History    Marital status:      Spouse name: Not on file    Number of children: Not on file    Years of education: Not on file    Highest education level: Not on file   Occupational History    Occupation:    Social Needs    Financial resource strain: Not on file    Food insecurity     Worry: Not on  "file     Inability: Not on file    Transportation needs     Medical: Not on file     Non-medical: Not on file   Tobacco Use    Smoking status: Former Smoker     Quit date: 1984     Years since quittin.6    Smokeless tobacco: Never Used   Substance and Sexual Activity    Alcohol use: No     Alcohol/week: 0.0 standard drinks    Drug use: No    Sexual activity: Yes     Partners: Female   Lifestyle    Physical activity     Days per week: Not on file     Minutes per session: Not on file    Stress: Not on file   Relationships    Social connections     Talks on phone: Not on file     Gets together: Not on file     Attends Hindu service: Not on file     Active member of club or organization: Not on file     Attends meetings of clubs or organizations: Not on file     Relationship status: Not on file   Other Topics Concern    Not on file   Social History Narrative    Not on file       Review of Systems - Negative except   Respiratory ROS: no dyspnea  Cardiovascular ROS: no exertional chest pain  Gastrointestinal ROS: NO abdominal discomfort,  NO rectal bleeding  Musculoskeletal ROS: no muscular pain  Neurological ROS: no recent stroke    Physical Exam:  /67 (BP Location: Left arm, Patient Position: Sitting)   Pulse 85   Temp 97.7 °F (36.5 °C) (Temporal)   Resp 16   Ht 6' 1" (1.854 m)   Wt 106.6 kg (235 lb)   SpO2 98%   BMI 31.00 kg/m²   General: no distress  Head: normocephalic  Mallampati Score   Neck: supple, symmetrical, trachea midline  Lungs:  normal respiratory effort  Heart: regular rate and rhythm  Abdomen: soft, non-tender non-distented; bowel sounds normal; no masses,  no organomegaly  Extremities: no cyanosis or edema, or clubbing    ASA:  II    PLAN  COLONOSCOPY.    SedationPlan :MAC    The details of the procedure, the possible need for biopsy or polypectomy and the potential risks including bleeding, perforation, missed polyps were discussed in detail.    "

## 2020-09-22 NOTE — ANESTHESIA POSTPROCEDURE EVALUATION
Anesthesia Post Evaluation    Patient: Jamarcus Mcqueen    Procedure(s) Performed: Procedure(s) (LRB):  COLONOSCOPY (N/A)    Final Anesthesia Type: general    Patient location during evaluation: PACU  Patient participation: Yes- Able to Participate  Level of consciousness: awake and alert and oriented  Post-procedure vital signs: reviewed and stable  Pain management: adequate  Airway patency: patent    PONV status at discharge: No PONV  Anesthetic complications: no      Cardiovascular status: blood pressure returned to baseline, hemodynamically stable and stable  Respiratory status: unassisted, room air and spontaneous ventilation  Hydration status: euvolemic  Follow-up not needed.          Vitals Value Taken Time   /67 09/21/20 1220   Temp 36.5 °C (97.7 °F) 09/21/20 1220   Pulse 85 09/21/20 1220   Resp 14 09/21/20 1220   SpO2 98 % 09/21/20 1220         Event Time   Out of Recovery 12:35:39         Pain/Do Score: Do Score: 10 (9/21/2020 12:20 PM)

## 2020-09-24 LAB
FINAL PATHOLOGIC DIAGNOSIS: NORMAL
GROSS: NORMAL

## 2020-09-29 ENCOUNTER — PATIENT MESSAGE (OUTPATIENT)
Dept: OTHER | Facility: OTHER | Age: 70
End: 2020-09-29

## 2020-12-11 ENCOUNTER — PATIENT MESSAGE (OUTPATIENT)
Dept: OTHER | Facility: OTHER | Age: 70
End: 2020-12-11

## 2021-01-04 ENCOUNTER — PATIENT MESSAGE (OUTPATIENT)
Dept: ADMINISTRATIVE | Facility: HOSPITAL | Age: 71
End: 2021-01-04

## 2021-04-05 ENCOUNTER — PATIENT MESSAGE (OUTPATIENT)
Dept: ADMINISTRATIVE | Facility: HOSPITAL | Age: 71
End: 2021-04-05

## 2021-05-07 DIAGNOSIS — E11.9 TYPE 2 DIABETES MELLITUS WITHOUT COMPLICATION: ICD-10-CM

## 2021-05-26 ENCOUNTER — PATIENT OUTREACH (OUTPATIENT)
Dept: ADMINISTRATIVE | Facility: HOSPITAL | Age: 71
End: 2021-05-26

## 2021-05-26 ENCOUNTER — PATIENT MESSAGE (OUTPATIENT)
Dept: ADMINISTRATIVE | Facility: HOSPITAL | Age: 71
End: 2021-05-26

## 2021-06-29 ENCOUNTER — PES CALL (OUTPATIENT)
Dept: ADMINISTRATIVE | Facility: CLINIC | Age: 71
End: 2021-06-29

## 2021-06-29 DIAGNOSIS — N18.30 STAGE 3 CHRONIC KIDNEY DISEASE, UNSPECIFIED WHETHER STAGE 3A OR 3B CKD: Primary | ICD-10-CM

## 2021-07-07 ENCOUNTER — TELEPHONE (OUTPATIENT)
Dept: INTERNAL MEDICINE | Facility: CLINIC | Age: 71
End: 2021-07-07

## 2021-07-08 ENCOUNTER — HOSPITAL ENCOUNTER (OUTPATIENT)
Dept: RADIOLOGY | Facility: HOSPITAL | Age: 71
Discharge: HOME | End: 2021-07-08
Attending: FAMILY MEDICINE
Payer: MEDICARE

## 2021-07-08 ENCOUNTER — OFFICE VISIT (OUTPATIENT)
Dept: INTERNAL MEDICINE | Facility: CLINIC | Age: 71
End: 2021-07-08
Attending: FAMILY MEDICINE
Payer: MEDICARE

## 2021-07-08 VITALS
HEART RATE: 66 BPM | WEIGHT: 235.25 LBS | SYSTOLIC BLOOD PRESSURE: 114 MMHG | DIASTOLIC BLOOD PRESSURE: 58 MMHG | HEIGHT: 73 IN | BODY MASS INDEX: 31.18 KG/M2 | OXYGEN SATURATION: 99 %

## 2021-07-08 DIAGNOSIS — Z87.891 PERSONAL HISTORY OF NICOTINE DEPENDENCE: ICD-10-CM

## 2021-07-08 DIAGNOSIS — E11.22 TYPE 2 DIABETES MELLITUS WITH CHRONIC KIDNEY DISEASE, WITH LONG-TERM CURRENT USE OF INSULIN, UNSPECIFIED CKD STAGE: Chronic | ICD-10-CM

## 2021-07-08 DIAGNOSIS — E78.5 HYPERLIPIDEMIA, UNSPECIFIED HYPERLIPIDEMIA TYPE: ICD-10-CM

## 2021-07-08 DIAGNOSIS — Z00.00 ANNUAL PHYSICAL EXAM: Primary | ICD-10-CM

## 2021-07-08 DIAGNOSIS — Z79.4 TYPE 2 DIABETES MELLITUS WITH CHRONIC KIDNEY DISEASE, WITH LONG-TERM CURRENT USE OF INSULIN, UNSPECIFIED CKD STAGE: Chronic | ICD-10-CM

## 2021-07-08 DIAGNOSIS — N18.31 STAGE 3A CHRONIC KIDNEY DISEASE: ICD-10-CM

## 2021-07-08 DIAGNOSIS — Z12.5 PROSTATE CANCER SCREENING: ICD-10-CM

## 2021-07-08 DIAGNOSIS — I10 HYPERTENSION, ESSENTIAL: ICD-10-CM

## 2021-07-08 PROCEDURE — 71046 XR CHEST PA AND LATERAL: ICD-10-PCS | Mod: 26,,, | Performed by: RADIOLOGY

## 2021-07-08 PROCEDURE — 71046 X-RAY EXAM CHEST 2 VIEWS: CPT | Mod: TC

## 2021-07-08 PROCEDURE — 71046 X-RAY EXAM CHEST 2 VIEWS: CPT | Mod: 26,,, | Performed by: RADIOLOGY

## 2021-07-08 PROCEDURE — 99214 OFFICE O/P EST MOD 30 MIN: CPT | Mod: S$PBB,,, | Performed by: FAMILY MEDICINE

## 2021-07-08 PROCEDURE — 99215 OFFICE O/P EST HI 40 MIN: CPT | Mod: PBBFAC | Performed by: FAMILY MEDICINE

## 2021-07-08 PROCEDURE — 99999 PR PBB SHADOW E&M-EST. PATIENT-LVL V: ICD-10-PCS | Mod: PBBFAC,,, | Performed by: FAMILY MEDICINE

## 2021-07-08 PROCEDURE — 99999 PR PBB SHADOW E&M-EST. PATIENT-LVL V: CPT | Mod: PBBFAC,,, | Performed by: FAMILY MEDICINE

## 2021-07-08 PROCEDURE — 99214 PR OFFICE/OUTPT VISIT, EST, LEVL IV, 30-39 MIN: ICD-10-PCS | Mod: S$PBB,,, | Performed by: FAMILY MEDICINE

## 2021-07-08 RX ORDER — VARDENAFIL HYDROCHLORIDE 20 MG/1
20 TABLET ORAL DAILY PRN
Qty: 30 TABLET | Refills: 1 | Status: SHIPPED | OUTPATIENT
Start: 2021-07-08 | End: 2023-12-05

## 2021-07-08 RX ORDER — LOSARTAN POTASSIUM 25 MG/1
25 TABLET ORAL DAILY
Qty: 90 TABLET | Refills: 1 | Status: SHIPPED | OUTPATIENT
Start: 2021-07-08

## 2021-07-08 RX ORDER — SIMVASTATIN 40 MG/1
40 TABLET, FILM COATED ORAL NIGHTLY
Qty: 90 TABLET | Refills: 1 | Status: SHIPPED | OUTPATIENT
Start: 2021-07-08 | End: 2023-12-05

## 2021-07-08 RX ORDER — EZETIMIBE 10 MG/1
10 TABLET ORAL DAILY
Qty: 90 TABLET | Refills: 1 | Status: SHIPPED | OUTPATIENT
Start: 2021-07-08 | End: 2023-12-05 | Stop reason: SDUPTHER

## 2021-07-08 RX ORDER — TRIAMCINOLONE ACETONIDE 1 MG/G
CREAM TOPICAL 2 TIMES DAILY
Qty: 80 G | Refills: 1 | Status: SHIPPED | OUTPATIENT
Start: 2021-07-08 | End: 2023-12-05

## 2021-07-08 RX ORDER — INSULIN GLARGINE 100 [IU]/ML
30 INJECTION, SOLUTION SUBCUTANEOUS NIGHTLY
Qty: 30 ML | Refills: 11 | Status: SHIPPED | OUTPATIENT
Start: 2021-07-08 | End: 2023-12-05 | Stop reason: SDUPTHER

## 2021-07-08 RX ORDER — VARDENAFIL HYDROCHLORIDE 20 MG/1
20 TABLET ORAL DAILY PRN
Qty: 30 TABLET | Refills: 1 | Status: SHIPPED | OUTPATIENT
Start: 2021-07-08 | End: 2021-07-08 | Stop reason: SDUPTHER

## 2021-07-08 RX ORDER — INSULIN LISPRO 100 [IU]/ML
20 INJECTION, SOLUTION INTRAVENOUS; SUBCUTANEOUS
Qty: 6 BOX | Refills: 0 | Status: SHIPPED | OUTPATIENT
Start: 2021-07-08 | End: 2022-10-28

## 2021-07-15 ENCOUNTER — TELEPHONE (OUTPATIENT)
Dept: ADMINISTRATIVE | Facility: CLINIC | Age: 71
End: 2021-07-15

## 2021-07-16 ENCOUNTER — PES CALL (OUTPATIENT)
Dept: ADMINISTRATIVE | Facility: CLINIC | Age: 71
End: 2021-07-16

## 2021-07-21 ENCOUNTER — PATIENT OUTREACH (OUTPATIENT)
Dept: ADMINISTRATIVE | Facility: OTHER | Age: 71
End: 2021-07-21

## 2021-07-23 ENCOUNTER — OFFICE VISIT (OUTPATIENT)
Dept: NEPHROLOGY | Facility: CLINIC | Age: 71
End: 2021-07-23
Payer: MEDICARE

## 2021-07-23 ENCOUNTER — OFFICE VISIT (OUTPATIENT)
Dept: INTERNAL MEDICINE | Facility: CLINIC | Age: 71
End: 2021-07-23
Payer: MEDICARE

## 2021-07-23 VITALS
WEIGHT: 233.44 LBS | OXYGEN SATURATION: 97 % | BODY MASS INDEX: 30.94 KG/M2 | HEART RATE: 83 BPM | SYSTOLIC BLOOD PRESSURE: 122 MMHG | HEIGHT: 73 IN | DIASTOLIC BLOOD PRESSURE: 54 MMHG

## 2021-07-23 VITALS
WEIGHT: 234.38 LBS | HEIGHT: 73 IN | SYSTOLIC BLOOD PRESSURE: 102 MMHG | HEART RATE: 80 BPM | DIASTOLIC BLOOD PRESSURE: 66 MMHG | RESPIRATION RATE: 16 BRPM | BODY MASS INDEX: 31.06 KG/M2

## 2021-07-23 DIAGNOSIS — E11.69 HYPERLIPIDEMIA ASSOCIATED WITH TYPE 2 DIABETES MELLITUS: ICD-10-CM

## 2021-07-23 DIAGNOSIS — E11.3592 CONTROLLED TYPE 2 DIABETES MELLITUS WITH LEFT EYE AFFECTED BY PROLIFERATIVE RETINOPATHY WITHOUT MACULAR EDEMA, WITH LONG-TERM CURRENT USE OF INSULIN: ICD-10-CM

## 2021-07-23 DIAGNOSIS — Z79.4 CONTROLLED TYPE 2 DIABETES MELLITUS WITH STAGE 3 CHRONIC KIDNEY DISEASE, WITH LONG-TERM CURRENT USE OF INSULIN: Primary | ICD-10-CM

## 2021-07-23 DIAGNOSIS — E11.3393 MODERATE NONPROLIFERATIVE DIABETIC RETINOPATHY OF BOTH EYES WITHOUT MACULAR EDEMA ASSOCIATED WITH TYPE 2 DIABETES MELLITUS: ICD-10-CM

## 2021-07-23 DIAGNOSIS — E11.59 HYPERTENSION ASSOCIATED WITH DIABETES: ICD-10-CM

## 2021-07-23 DIAGNOSIS — E66.09 CLASS 1 OBESITY DUE TO EXCESS CALORIES WITH SERIOUS COMORBIDITY AND BODY MASS INDEX (BMI) OF 30.0 TO 30.9 IN ADULT: ICD-10-CM

## 2021-07-23 DIAGNOSIS — E11.22 TYPE 2 DIABETES MELLITUS WITH STAGE 3A CHRONIC KIDNEY DISEASE, WITH LONG-TERM CURRENT USE OF INSULIN: ICD-10-CM

## 2021-07-23 DIAGNOSIS — H40.1112 PRIMARY OPEN ANGLE GLAUCOMA (POAG) OF RIGHT EYE, MODERATE STAGE: ICD-10-CM

## 2021-07-23 DIAGNOSIS — E78.5 HYPERLIPIDEMIA ASSOCIATED WITH TYPE 2 DIABETES MELLITUS: ICD-10-CM

## 2021-07-23 DIAGNOSIS — E11.22 CONTROLLED TYPE 2 DIABETES MELLITUS WITH STAGE 3 CHRONIC KIDNEY DISEASE, WITH LONG-TERM CURRENT USE OF INSULIN: Primary | ICD-10-CM

## 2021-07-23 DIAGNOSIS — E11.65 TYPE 2 DIABETES MELLITUS WITH HYPERGLYCEMIA, WITH LONG-TERM CURRENT USE OF INSULIN: ICD-10-CM

## 2021-07-23 DIAGNOSIS — E11.42 TYPE 2 DIABETES MELLITUS WITH DIABETIC POLYNEUROPATHY, WITH LONG-TERM CURRENT USE OF INSULIN: Chronic | ICD-10-CM

## 2021-07-23 DIAGNOSIS — Z79.4 TYPE 2 DIABETES MELLITUS WITH CHRONIC KIDNEY DISEASE, WITH LONG-TERM CURRENT USE OF INSULIN, UNSPECIFIED CKD STAGE: Chronic | ICD-10-CM

## 2021-07-23 DIAGNOSIS — N18.31 STAGE 3A CHRONIC KIDNEY DISEASE: ICD-10-CM

## 2021-07-23 DIAGNOSIS — E11.3592 PROLIFERATIVE DIABETIC RETINOPATHY OF LEFT EYE WITHOUT MACULAR EDEMA ASSOCIATED WITH TYPE 2 DIABETES MELLITUS: ICD-10-CM

## 2021-07-23 DIAGNOSIS — N18.31 TYPE 2 DIABETES MELLITUS WITH STAGE 3A CHRONIC KIDNEY DISEASE, WITH LONG-TERM CURRENT USE OF INSULIN: ICD-10-CM

## 2021-07-23 DIAGNOSIS — N18.30 CONTROLLED TYPE 2 DIABETES MELLITUS WITH STAGE 3 CHRONIC KIDNEY DISEASE, WITH LONG-TERM CURRENT USE OF INSULIN: Primary | ICD-10-CM

## 2021-07-23 DIAGNOSIS — Z79.4 CONTROLLED TYPE 2 DIABETES MELLITUS WITH RIGHT EYE AFFECTED BY MODERATE NONPROLIFERATIVE RETINOPATHY WITHOUT MACULAR EDEMA, WITH LONG-TERM CURRENT USE OF INSULIN: ICD-10-CM

## 2021-07-23 DIAGNOSIS — Z79.4 CONTROLLED TYPE 2 DIABETES MELLITUS WITH LEFT EYE AFFECTED BY PROLIFERATIVE RETINOPATHY WITHOUT MACULAR EDEMA, WITH LONG-TERM CURRENT USE OF INSULIN: ICD-10-CM

## 2021-07-23 DIAGNOSIS — E11.3391 CONTROLLED TYPE 2 DIABETES MELLITUS WITH RIGHT EYE AFFECTED BY MODERATE NONPROLIFERATIVE RETINOPATHY WITHOUT MACULAR EDEMA, WITH LONG-TERM CURRENT USE OF INSULIN: ICD-10-CM

## 2021-07-23 DIAGNOSIS — F32.A DEPRESSION, UNSPECIFIED DEPRESSION TYPE: ICD-10-CM

## 2021-07-23 DIAGNOSIS — Z79.4 TYPE 2 DIABETES MELLITUS WITH DIABETIC POLYNEUROPATHY, WITH LONG-TERM CURRENT USE OF INSULIN: Chronic | ICD-10-CM

## 2021-07-23 DIAGNOSIS — Z00.00 ENCOUNTER FOR PREVENTIVE HEALTH EXAMINATION: Primary | ICD-10-CM

## 2021-07-23 DIAGNOSIS — Z79.4 TYPE 2 DIABETES MELLITUS WITH HYPERGLYCEMIA, WITH LONG-TERM CURRENT USE OF INSULIN: ICD-10-CM

## 2021-07-23 DIAGNOSIS — E11.22 TYPE 2 DIABETES MELLITUS WITH CHRONIC KIDNEY DISEASE, WITH LONG-TERM CURRENT USE OF INSULIN, UNSPECIFIED CKD STAGE: Chronic | ICD-10-CM

## 2021-07-23 DIAGNOSIS — I15.2 HYPERTENSION ASSOCIATED WITH DIABETES: ICD-10-CM

## 2021-07-23 DIAGNOSIS — Z79.4 TYPE 2 DIABETES MELLITUS WITH STAGE 3A CHRONIC KIDNEY DISEASE, WITH LONG-TERM CURRENT USE OF INSULIN: ICD-10-CM

## 2021-07-23 DIAGNOSIS — H40.1123 PRIMARY OPEN ANGLE GLAUCOMA (POAG) OF LEFT EYE, SEVERE STAGE: ICD-10-CM

## 2021-07-23 DIAGNOSIS — H43.12 VITREOUS HEMORRHAGE, LEFT EYE: ICD-10-CM

## 2021-07-23 PROCEDURE — G0439 PR MEDICARE ANNUAL WELLNESS SUBSEQUENT VISIT: ICD-10-PCS | Mod: ,,, | Performed by: NURSE PRACTITIONER

## 2021-07-23 PROCEDURE — 99213 OFFICE O/P EST LOW 20 MIN: CPT | Mod: PBBFAC | Performed by: NURSE PRACTITIONER

## 2021-07-23 PROCEDURE — 99213 OFFICE O/P EST LOW 20 MIN: CPT | Mod: PBBFAC,27 | Performed by: INTERNAL MEDICINE

## 2021-07-23 PROCEDURE — 99999 PR PBB SHADOW E&M-EST. PATIENT-LVL III: CPT | Mod: PBBFAC,,, | Performed by: INTERNAL MEDICINE

## 2021-07-23 PROCEDURE — 99999 PR PBB SHADOW E&M-EST. PATIENT-LVL III: ICD-10-PCS | Mod: PBBFAC,,, | Performed by: INTERNAL MEDICINE

## 2021-07-23 PROCEDURE — 99213 OFFICE O/P EST LOW 20 MIN: CPT | Mod: S$PBB,,, | Performed by: INTERNAL MEDICINE

## 2021-07-23 PROCEDURE — 99999 PR PBB SHADOW E&M-EST. PATIENT-LVL III: ICD-10-PCS | Mod: PBBFAC,,, | Performed by: NURSE PRACTITIONER

## 2021-07-23 PROCEDURE — 99999 PR PBB SHADOW E&M-EST. PATIENT-LVL III: CPT | Mod: PBBFAC,,, | Performed by: NURSE PRACTITIONER

## 2021-07-23 PROCEDURE — 99213 PR OFFICE/OUTPT VISIT, EST, LEVL III, 20-29 MIN: ICD-10-PCS | Mod: S$PBB,,, | Performed by: INTERNAL MEDICINE

## 2021-07-23 PROCEDURE — G0439 PPPS, SUBSEQ VISIT: HCPCS | Mod: ,,, | Performed by: NURSE PRACTITIONER

## 2021-07-23 RX ORDER — VITAMIN B COMPLEX
1 CAPSULE ORAL DAILY
COMMUNITY

## 2021-07-23 RX ORDER — CHOLECALCIFEROL (VITAMIN D3) 25 MCG
1000 TABLET ORAL DAILY
COMMUNITY

## 2021-07-29 ENCOUNTER — TELEPHONE (OUTPATIENT)
Dept: INTERNAL MEDICINE | Facility: CLINIC | Age: 71
End: 2021-07-29

## 2021-07-29 DIAGNOSIS — E11.22 TYPE 2 DIABETES MELLITUS WITH STAGE 3A CHRONIC KIDNEY DISEASE, WITH LONG-TERM CURRENT USE OF INSULIN: Primary | ICD-10-CM

## 2021-07-29 DIAGNOSIS — Z79.4 TYPE 2 DIABETES MELLITUS WITH STAGE 3A CHRONIC KIDNEY DISEASE, WITH LONG-TERM CURRENT USE OF INSULIN: Primary | ICD-10-CM

## 2021-07-29 DIAGNOSIS — N18.31 TYPE 2 DIABETES MELLITUS WITH STAGE 3A CHRONIC KIDNEY DISEASE, WITH LONG-TERM CURRENT USE OF INSULIN: Primary | ICD-10-CM

## 2021-08-04 ENCOUNTER — TELEPHONE (OUTPATIENT)
Dept: DIABETES | Facility: CLINIC | Age: 71
End: 2021-08-04

## 2021-08-25 ENCOUNTER — PATIENT OUTREACH (OUTPATIENT)
Dept: ADMINISTRATIVE | Facility: OTHER | Age: 71
End: 2021-08-25

## 2021-09-14 ENCOUNTER — TELEPHONE (OUTPATIENT)
Dept: PODIATRY | Facility: CLINIC | Age: 71
End: 2021-09-14

## 2021-09-21 ENCOUNTER — TELEPHONE (OUTPATIENT)
Dept: PODIATRY | Facility: CLINIC | Age: 71
End: 2021-09-21

## 2021-09-26 ENCOUNTER — PATIENT OUTREACH (OUTPATIENT)
Dept: ADMINISTRATIVE | Facility: OTHER | Age: 71
End: 2021-09-26

## 2021-09-29 ENCOUNTER — OFFICE VISIT (OUTPATIENT)
Dept: PODIATRY | Facility: CLINIC | Age: 71
End: 2021-09-29
Payer: MEDICARE

## 2021-09-29 VITALS — BODY MASS INDEX: 31.06 KG/M2 | HEIGHT: 73 IN | WEIGHT: 234.38 LBS

## 2021-09-29 DIAGNOSIS — E11.42 TYPE 2 DIABETES MELLITUS WITH DIABETIC POLYNEUROPATHY, WITH LONG-TERM CURRENT USE OF INSULIN: Primary | ICD-10-CM

## 2021-09-29 DIAGNOSIS — Z79.4 TYPE 2 DIABETES MELLITUS WITH DIABETIC POLYNEUROPATHY, WITH LONG-TERM CURRENT USE OF INSULIN: Primary | ICD-10-CM

## 2021-09-29 DIAGNOSIS — B35.3 TINEA PEDIS OF BOTH FEET: ICD-10-CM

## 2021-09-29 PROCEDURE — 99213 OFFICE O/P EST LOW 20 MIN: CPT | Mod: PBBFAC,PN | Performed by: STUDENT IN AN ORGANIZED HEALTH CARE EDUCATION/TRAINING PROGRAM

## 2021-09-29 PROCEDURE — 99213 OFFICE O/P EST LOW 20 MIN: CPT | Mod: S$PBB,,, | Performed by: STUDENT IN AN ORGANIZED HEALTH CARE EDUCATION/TRAINING PROGRAM

## 2021-09-29 PROCEDURE — 99999 PR PBB SHADOW E&M-EST. PATIENT-LVL III: CPT | Mod: PBBFAC,,, | Performed by: STUDENT IN AN ORGANIZED HEALTH CARE EDUCATION/TRAINING PROGRAM

## 2021-09-29 PROCEDURE — 99999 PR PBB SHADOW E&M-EST. PATIENT-LVL III: ICD-10-PCS | Mod: PBBFAC,,, | Performed by: STUDENT IN AN ORGANIZED HEALTH CARE EDUCATION/TRAINING PROGRAM

## 2021-09-29 PROCEDURE — 99213 PR OFFICE/OUTPT VISIT, EST, LEVL III, 20-29 MIN: ICD-10-PCS | Mod: S$PBB,,, | Performed by: STUDENT IN AN ORGANIZED HEALTH CARE EDUCATION/TRAINING PROGRAM

## 2021-10-04 ENCOUNTER — PATIENT MESSAGE (OUTPATIENT)
Dept: ADMINISTRATIVE | Facility: HOSPITAL | Age: 71
End: 2021-10-04

## 2021-12-10 ENCOUNTER — PATIENT OUTREACH (OUTPATIENT)
Dept: ADMINISTRATIVE | Facility: OTHER | Age: 71
End: 2021-12-10
Payer: MEDICARE

## 2021-12-14 ENCOUNTER — LAB VISIT (OUTPATIENT)
Dept: LAB | Facility: HOSPITAL | Age: 71
End: 2021-12-14
Attending: INTERNAL MEDICINE
Payer: MEDICARE

## 2021-12-14 ENCOUNTER — OFFICE VISIT (OUTPATIENT)
Dept: ENDOCRINOLOGY | Facility: CLINIC | Age: 71
End: 2021-12-14
Attending: FAMILY MEDICINE
Payer: MEDICARE

## 2021-12-14 VITALS
SYSTOLIC BLOOD PRESSURE: 101 MMHG | OXYGEN SATURATION: 98 % | DIASTOLIC BLOOD PRESSURE: 67 MMHG | HEART RATE: 66 BPM | BODY MASS INDEX: 31.2 KG/M2 | WEIGHT: 235.44 LBS | HEIGHT: 73 IN

## 2021-12-14 DIAGNOSIS — Z79.4 TYPE 2 DIABETES MELLITUS WITH STAGE 3A CHRONIC KIDNEY DISEASE, WITH LONG-TERM CURRENT USE OF INSULIN: ICD-10-CM

## 2021-12-14 DIAGNOSIS — N18.31 TYPE 2 DIABETES MELLITUS WITH STAGE 3A CHRONIC KIDNEY DISEASE, WITH LONG-TERM CURRENT USE OF INSULIN: ICD-10-CM

## 2021-12-14 DIAGNOSIS — E11.22 TYPE 2 DIABETES MELLITUS WITH CHRONIC KIDNEY DISEASE, WITH LONG-TERM CURRENT USE OF INSULIN, UNSPECIFIED CKD STAGE: Chronic | ICD-10-CM

## 2021-12-14 DIAGNOSIS — E11.22 TYPE 2 DIABETES MELLITUS WITH STAGE 3A CHRONIC KIDNEY DISEASE, WITH LONG-TERM CURRENT USE OF INSULIN: ICD-10-CM

## 2021-12-14 DIAGNOSIS — E66.09 CLASS 1 OBESITY DUE TO EXCESS CALORIES WITH SERIOUS COMORBIDITY AND BODY MASS INDEX (BMI) OF 30.0 TO 30.9 IN ADULT: ICD-10-CM

## 2021-12-14 DIAGNOSIS — N18.31 STAGE 3A CHRONIC KIDNEY DISEASE: ICD-10-CM

## 2021-12-14 DIAGNOSIS — Z79.4 TYPE 2 DIABETES MELLITUS WITH CHRONIC KIDNEY DISEASE, WITH LONG-TERM CURRENT USE OF INSULIN, UNSPECIFIED CKD STAGE: Chronic | ICD-10-CM

## 2021-12-14 LAB
ALBUMIN SERPL BCP-MCNC: 3.8 G/DL (ref 3.5–5.2)
ALP SERPL-CCNC: 73 U/L (ref 55–135)
ALT SERPL W/O P-5'-P-CCNC: 27 U/L (ref 10–44)
ANION GAP SERPL CALC-SCNC: 8 MMOL/L (ref 8–16)
AST SERPL-CCNC: 28 U/L (ref 10–40)
BILIRUB SERPL-MCNC: 1.2 MG/DL (ref 0.1–1)
BUN SERPL-MCNC: 16 MG/DL (ref 8–23)
CALCIUM SERPL-MCNC: 10.5 MG/DL (ref 8.7–10.5)
CHLORIDE SERPL-SCNC: 100 MMOL/L (ref 95–110)
CO2 SERPL-SCNC: 29 MMOL/L (ref 23–29)
CREAT SERPL-MCNC: 1.5 MG/DL (ref 0.5–1.4)
EST. GFR  (AFRICAN AMERICAN): 53.8 ML/MIN/1.73 M^2
EST. GFR  (NON AFRICAN AMERICAN): 46.5 ML/MIN/1.73 M^2
ESTIMATED AVG GLUCOSE: 171 MG/DL (ref 68–131)
GLUCOSE SERPL-MCNC: 72 MG/DL (ref 70–110)
HBA1C MFR BLD: 7.6 % (ref 4–5.6)
POTASSIUM SERPL-SCNC: 4.5 MMOL/L (ref 3.5–5.1)
PROT SERPL-MCNC: 6.9 G/DL (ref 6–8.4)
SODIUM SERPL-SCNC: 137 MMOL/L (ref 136–145)

## 2021-12-14 PROCEDURE — 99999 PR PBB SHADOW E&M-EST. PATIENT-LVL IV: ICD-10-PCS | Mod: PBBFAC,,, | Performed by: INTERNAL MEDICINE

## 2021-12-14 PROCEDURE — 99204 OFFICE O/P NEW MOD 45 MIN: CPT | Mod: S$PBB,,, | Performed by: INTERNAL MEDICINE

## 2021-12-14 PROCEDURE — 99204 PR OFFICE/OUTPT VISIT, NEW, LEVL IV, 45-59 MIN: ICD-10-PCS | Mod: S$PBB,,, | Performed by: INTERNAL MEDICINE

## 2021-12-14 PROCEDURE — 83036 HEMOGLOBIN GLYCOSYLATED A1C: CPT | Performed by: INTERNAL MEDICINE

## 2021-12-14 PROCEDURE — 99999 PR PBB SHADOW E&M-EST. PATIENT-LVL IV: CPT | Mod: PBBFAC,,, | Performed by: INTERNAL MEDICINE

## 2021-12-14 PROCEDURE — 99214 OFFICE O/P EST MOD 30 MIN: CPT | Mod: PBBFAC | Performed by: INTERNAL MEDICINE

## 2021-12-14 PROCEDURE — 36415 COLL VENOUS BLD VENIPUNCTURE: CPT | Performed by: INTERNAL MEDICINE

## 2021-12-14 PROCEDURE — 80053 COMPREHEN METABOLIC PANEL: CPT | Performed by: INTERNAL MEDICINE

## 2022-01-19 ENCOUNTER — PATIENT MESSAGE (OUTPATIENT)
Dept: ADMINISTRATIVE | Facility: HOSPITAL | Age: 72
End: 2022-01-19
Payer: MEDICARE

## 2022-03-16 ENCOUNTER — PATIENT MESSAGE (OUTPATIENT)
Dept: ADMINISTRATIVE | Facility: HOSPITAL | Age: 72
End: 2022-03-16
Payer: MEDICARE

## 2022-03-22 ENCOUNTER — PATIENT OUTREACH (OUTPATIENT)
Dept: ADMINISTRATIVE | Facility: OTHER | Age: 72
End: 2022-03-22
Payer: MEDICARE

## 2022-03-22 NOTE — PROGRESS NOTES
Health Maintenance Due   Topic Date Due    Shingles Vaccine (2 of 3) 09/18/2013    Eye Exam  05/21/2021    Influenza Vaccine (1) 09/01/2021    COVID-19 Vaccine (3 - Booster for Pfizer series) 09/01/2021     Updates were requested from care everywhere.  Chart was reviewed for overdue Proactive [facility] Encounters ([facility]) topics (CRS, Breast Cancer Screening, Eye exam)  Health Maintenance has been updated.  LINKS immunization registry triggered.  Immunizations were reconciled.

## 2022-04-05 DIAGNOSIS — Z71.89 COMPLEX CARE COORDINATION: ICD-10-CM

## 2022-06-29 DIAGNOSIS — E11.9 TYPE 2 DIABETES MELLITUS WITHOUT COMPLICATION: ICD-10-CM

## 2022-07-19 ENCOUNTER — PES CALL (OUTPATIENT)
Dept: ADMINISTRATIVE | Facility: CLINIC | Age: 72
End: 2022-07-19
Payer: MEDICARE

## 2022-09-13 ENCOUNTER — OFFICE VISIT (OUTPATIENT)
Dept: INTERNAL MEDICINE | Facility: CLINIC | Age: 72
End: 2022-09-13
Payer: MEDICARE

## 2022-09-13 VITALS
DIASTOLIC BLOOD PRESSURE: 60 MMHG | SYSTOLIC BLOOD PRESSURE: 120 MMHG | WEIGHT: 221.56 LBS | HEIGHT: 73 IN | BODY MASS INDEX: 29.36 KG/M2 | OXYGEN SATURATION: 99 % | HEART RATE: 75 BPM

## 2022-09-13 DIAGNOSIS — Z79.4 TYPE 2 DIABETES MELLITUS WITH DIABETIC POLYNEUROPATHY, WITH LONG-TERM CURRENT USE OF INSULIN: Chronic | ICD-10-CM

## 2022-09-13 DIAGNOSIS — N18.31 STAGE 3A CHRONIC KIDNEY DISEASE: ICD-10-CM

## 2022-09-13 DIAGNOSIS — Z79.4 LONG-TERM INSULIN USE: ICD-10-CM

## 2022-09-13 DIAGNOSIS — E11.3391 CONTROLLED TYPE 2 DIABETES MELLITUS WITH RIGHT EYE AFFECTED BY MODERATE NONPROLIFERATIVE RETINOPATHY WITHOUT MACULAR EDEMA, WITH LONG-TERM CURRENT USE OF INSULIN: ICD-10-CM

## 2022-09-13 DIAGNOSIS — I15.2 HYPERTENSION ASSOCIATED WITH DIABETES: ICD-10-CM

## 2022-09-13 DIAGNOSIS — E11.69 HYPERLIPIDEMIA ASSOCIATED WITH TYPE 2 DIABETES MELLITUS: ICD-10-CM

## 2022-09-13 DIAGNOSIS — E11.3592 PROLIFERATIVE DIABETIC RETINOPATHY OF LEFT EYE WITHOUT MACULAR EDEMA ASSOCIATED WITH TYPE 2 DIABETES MELLITUS: ICD-10-CM

## 2022-09-13 DIAGNOSIS — E11.59 HYPERTENSION ASSOCIATED WITH DIABETES: ICD-10-CM

## 2022-09-13 DIAGNOSIS — Z79.4 CONTROLLED TYPE 2 DIABETES MELLITUS WITH RIGHT EYE AFFECTED BY MODERATE NONPROLIFERATIVE RETINOPATHY WITHOUT MACULAR EDEMA, WITH LONG-TERM CURRENT USE OF INSULIN: ICD-10-CM

## 2022-09-13 DIAGNOSIS — E11.42 TYPE 2 DIABETES MELLITUS WITH DIABETIC POLYNEUROPATHY, WITH LONG-TERM CURRENT USE OF INSULIN: Chronic | ICD-10-CM

## 2022-09-13 DIAGNOSIS — Z79.4 TYPE 2 DIABETES MELLITUS WITH HYPERGLYCEMIA, WITH LONG-TERM CURRENT USE OF INSULIN: ICD-10-CM

## 2022-09-13 DIAGNOSIS — E78.5 HYPERLIPIDEMIA ASSOCIATED WITH TYPE 2 DIABETES MELLITUS: ICD-10-CM

## 2022-09-13 DIAGNOSIS — Z00.00 ENCOUNTER FOR PREVENTIVE HEALTH EXAMINATION: Primary | ICD-10-CM

## 2022-09-13 DIAGNOSIS — E11.3393 MODERATE NONPROLIFERATIVE DIABETIC RETINOPATHY OF BOTH EYES WITHOUT MACULAR EDEMA ASSOCIATED WITH TYPE 2 DIABETES MELLITUS: ICD-10-CM

## 2022-09-13 DIAGNOSIS — E11.3592 CONTROLLED TYPE 2 DIABETES MELLITUS WITH LEFT EYE AFFECTED BY PROLIFERATIVE RETINOPATHY WITHOUT MACULAR EDEMA, WITH LONG-TERM CURRENT USE OF INSULIN: ICD-10-CM

## 2022-09-13 DIAGNOSIS — E11.65 TYPE 2 DIABETES MELLITUS WITH HYPERGLYCEMIA, WITH LONG-TERM CURRENT USE OF INSULIN: ICD-10-CM

## 2022-09-13 DIAGNOSIS — Z79.4 TYPE 2 DIABETES MELLITUS WITH STAGE 3A CHRONIC KIDNEY DISEASE, WITH LONG-TERM CURRENT USE OF INSULIN: Chronic | ICD-10-CM

## 2022-09-13 DIAGNOSIS — N18.31 TYPE 2 DIABETES MELLITUS WITH STAGE 3A CHRONIC KIDNEY DISEASE, WITH LONG-TERM CURRENT USE OF INSULIN: Chronic | ICD-10-CM

## 2022-09-13 DIAGNOSIS — E11.22 TYPE 2 DIABETES MELLITUS WITH STAGE 3A CHRONIC KIDNEY DISEASE, WITH LONG-TERM CURRENT USE OF INSULIN: Chronic | ICD-10-CM

## 2022-09-13 DIAGNOSIS — H43.12 VITREOUS HEMORRHAGE, LEFT EYE: ICD-10-CM

## 2022-09-13 DIAGNOSIS — Z23 ENCOUNTER FOR IMMUNIZATION: ICD-10-CM

## 2022-09-13 DIAGNOSIS — Z79.4 CONTROLLED TYPE 2 DIABETES MELLITUS WITH LEFT EYE AFFECTED BY PROLIFERATIVE RETINOPATHY WITHOUT MACULAR EDEMA, WITH LONG-TERM CURRENT USE OF INSULIN: ICD-10-CM

## 2022-09-13 DIAGNOSIS — G57.10 MERALGIA PARESTHETICA, UNSPECIFIED LATERALITY: ICD-10-CM

## 2022-09-13 DIAGNOSIS — F32.A DEPRESSION, UNSPECIFIED DEPRESSION TYPE: ICD-10-CM

## 2022-09-13 PROCEDURE — G0439 PPPS, SUBSEQ VISIT: HCPCS | Mod: ,,, | Performed by: NURSE PRACTITIONER

## 2022-09-13 PROCEDURE — 99999 PR PBB SHADOW E&M-EST. PATIENT-LVL IV: ICD-10-PCS | Mod: PBBFAC,,, | Performed by: NURSE PRACTITIONER

## 2022-09-13 PROCEDURE — G0439 PR MEDICARE ANNUAL WELLNESS SUBSEQUENT VISIT: ICD-10-PCS | Mod: ,,, | Performed by: NURSE PRACTITIONER

## 2022-09-13 PROCEDURE — 99999 PR PBB SHADOW E&M-EST. PATIENT-LVL IV: CPT | Mod: PBBFAC,,, | Performed by: NURSE PRACTITIONER

## 2022-09-13 PROCEDURE — G0008 ADMIN INFLUENZA VIRUS VAC: HCPCS | Mod: PBBFAC

## 2022-09-13 PROCEDURE — 99214 OFFICE O/P EST MOD 30 MIN: CPT | Mod: PBBFAC,25 | Performed by: NURSE PRACTITIONER

## 2022-09-13 NOTE — PROGRESS NOTES
"Jamarcus Mcqueen presented for a  Medicare AWV and comprehensive Health Risk Assessment today. The following components were reviewed and updated:    Medical history  Family History  Social history  Allergies and Current Medications  Health Risk Assessment  Health Maintenance  Care Team         ** See Completed Assessments for Annual Wellness Visit within the encounter summary.**         The following assessments were completed:  Living Situation  CAGE  Depression Screening  Timed Get Up and Go  Whisper Test  Cognitive Function Screening    Nutrition Screening  ADL Screening  PAQ Screening  Review for Opioid Screening: Pt does not have Rx for Opioids   Review for Substance Use Disorders: Patient does not use substances      Vitals:    09/13/22 1334   BP: 120/60   BP Location: Right arm   Patient Position: Sitting   Pulse: 75   SpO2: 99%   Weight: 100.5 kg (221 lb 9 oz)   Height: 6' 1" (1.854 m)     Body mass index is 29.23 kg/m².    Physical Exam  Vitals reviewed.   Constitutional:       Appearance: Normal appearance.   HENT:      Head: Normocephalic.   Cardiovascular:      Rate and Rhythm: Normal rate.   Pulmonary:      Effort: Pulmonary effort is normal.   Abdominal:      General: Bowel sounds are normal.   Musculoskeletal:         General: Normal range of motion.      Cervical back: Normal range of motion.      Right lower leg: No edema.      Left lower leg: No edema.   Skin:     General: Skin is warm and dry.      Capillary Refill: Capillary refill takes less than 2 seconds.   Neurological:      Mental Status: He is alert and oriented to person, place, and time.   Psychiatric:         Behavior: Behavior normal.         Thought Content: Thought content normal.         Judgment: Judgment normal.             Diagnoses and health risks identified today and associated recommendations/orders:    1. Encounter for preventive health examination  Assessments completed.   recommendations reviewed. Flu shot given today. " Instructed to schedule annual exam with PCP and eye exam. Schedule podiatry visit for diabetic foot exam. Labs per PCP at visit. Discussed covid booster and shingrix. F/u with endocrinology and nephrology as instructed.    2. Stage 3a chronic kidney disease  Chronic, stable on current regimen. Followed by nephrology. Due for f/u.    3. Type 2 diabetes mellitus with stage 3a chronic kidney disease, with long-term current use of insulin  Chronic, stable on current regimen. Followed by nephrology. Due for f/u.    4. Proliferative diabetic retinopathy of left eye without macular edema associated with type 2 diabetes mellitus  Chronic, stable on current regimen. Followed by ophthalmology. Due for f/u.    5. Moderate nonproliferative diabetic retinopathy of both eyes without macular edema associated with type 2 diabetes mellitus  Chronic, stable on current regimen. Followed by ophthalmology. Due for f/u.    6. Vitreous hemorrhage, left eye  Chronic, stable on current regimen. Followed by ophthalmology. Due for f/u.    7. Controlled type 2 diabetes mellitus with right eye affected by moderate nonproliferative retinopathy without macular edema, with long-term current use of insulin  Chronic, stable on current regimen. Followed by ophthalmology. Due for f/u.    8. Controlled type 2 diabetes mellitus with left eye affected by proliferative retinopathy without macular edema, with long-term current use of insulin  Chronic, stable on current regimen. Followed by ophthalmology. Due for f/u.    9. Type 2 diabetes mellitus with hyperglycemia, with long-term current use of insulin  Chronic, stable on current regimen. Followed by endocrinology. Due for f/u.    10. Long-term insulin use  Chronic, stable on current regimen. Followed by endocrinology. Due for f/u.    11. Type 2 diabetes mellitus with diabetic polyneuropathy, with long-term current use of insulin  Chronic, stable on current regimen. Followed by podiatry.    12.  Hypertension associated with diabetes  Chronic, stable on current regimen. Followed by PCP.    13. Hyperlipidemia associated with type 2 diabetes mellitus  Chronic, stable on current regimen. Followed by PCP.    14. Depression, unspecified depression type  Chronic, stable on current regimen. Followed by PCP.    15. Meralgia paresthetica, unspecified laterality  Chronic, stable on current regimen. Followed by PCP.    16. BMI 29.0-29.9,adult  Chronic, stable on current regimen. Followed by PCP.    17. Encounter for immunization  Flu shot given today in clinic.  - Influenza (FLUAD) - Quadrivalent (Adjuvanted) *Preferred* (65+) (PF)      Provided Jamarcus with a 5-10 year written screening schedule and personal prevention plan. Recommendations were developed using the USPSTF age appropriate recommendations. Education, counseling, and referrals were provided as needed. After Visit Summary printed and given to patient which includes a list of additional screenings\tests needed.    Follow up in about 1 year (around 9/13/2023) for Medicare AWV and with PCP as instructed.       Wendy Bryson NP    I offered to discuss advanced care planning, including how to pick a person who would make decisions for you if you were unable to make them for yourself, called a health care power of , and what kind of decisions you might make such as use of life sustaining treatments such as ventilators and tube feeding when faced with a life limiting illness recorded on a living will that they will need to know. (How you want to be cared for as you near the end of your natural life)     X Patient is interested in learning more about how to make advanced directives.  I provided them paperwork and offered to discuss this with them.

## 2022-09-13 NOTE — Clinical Note
Medicare awv completed. Flu shot given today. Instructed to schedule annual exam with PCP and eye exam. Schedule podiatry visit for diabetic foot exam. Labs per PCP at visit. Discussed covid booster and shingrix. F/u with endocrinology and nephrology as instructed.  --Wendy

## 2022-09-13 NOTE — PATIENT INSTRUCTIONS
1. Schedule annual with Dr. Kt Katz MD. Will likely need labs after visit.    2. Flu shot today.    3. Schedule eye exam.    4. Schedule podiatry visit, last one 9/2021.    5. New covid booster if interested.    6. Can price additional shingles vaccines (SHINGRIX) through insurance if interested. Prices can vary widely from pharmacy to pharmacy.    Counseling and Referral of Other Preventative  (Italic type indicates deductible and co-insurance are waived)    Patient Name: Jamarcus Mcqueen  Today's Date: 9/13/2022    Health Maintenance         Date Due Completion Date    Shingles Vaccine (2 of 3) 09/18/2013 7/24/2013    Eye Exam 05/21/2021 5/21/2020    COVID-19 Vaccine (3 - Booster for Pfizer series) 09/01/2021 4/1/2021    Hemoglobin A1c 06/14/2022 12/14/2021    Override on 3/22/2018: Done    Lipid Panel 07/08/2022 7/8/2021    Influenza Vaccine (1) 09/01/2022 2/1/2019    Foot Exam 09/29/2022 9/29/2021    Override on 6/19/2020: Done (Podiatry)    Override on 5/14/2020: Done    Override on 3/22/2018: Done    Low Dose Statin 12/14/2022 12/14/2021    Diabetes Urine Screening 12/14/2022 12/14/2021    Colorectal Cancer Screening 09/21/2023 9/21/2020    TETANUS VACCINE 07/08/2031 7/8/2021          Orders Placed This Encounter   Procedures    Influenza (FLUAD) - Quadrivalent (Adjuvanted) *Preferred* (65+) (PF)       The following information is provided to all patients.  This information is to help you find resources for any of the problems found today that may be affecting your health:                Living healthy guide: www.[facility].[place].gov      Understanding Diabetes: www.diabetes.org      Eating healthy: www.cdc.gov/healthyweight      CDC home safety checklist: www.cdc.gov/steadi/patient.html      Agency on Aging: www.goea.[place].gov      Alcoholics anonymous (AA): www.aa.org      Physical Activity: www.any.nih.gov/nn2alef      Tobacco use: www.quitwithusla.org

## 2022-09-13 NOTE — PROGRESS NOTES
After obtaining consent, and per orders of NP Wendy Bryson, injection of FluAD given by Maria Fernanda Evans on the right deltoie. Patient instructed to remain in clinic for 20 minutes afterwards, and to report any adverse reaction to me immediately.

## 2022-09-14 DIAGNOSIS — E11.9 TYPE 2 DIABETES MELLITUS WITHOUT COMPLICATION: ICD-10-CM

## 2022-09-20 ENCOUNTER — PATIENT MESSAGE (OUTPATIENT)
Dept: ADMINISTRATIVE | Facility: HOSPITAL | Age: 72
End: 2022-09-20
Payer: MEDICARE

## 2022-10-21 ENCOUNTER — NURSE TRIAGE (OUTPATIENT)
Dept: ADMINISTRATIVE | Facility: CLINIC | Age: 72
End: 2022-10-21
Payer: MEDICARE

## 2022-10-21 ENCOUNTER — TELEPHONE (OUTPATIENT)
Dept: INTERNAL MEDICINE | Facility: CLINIC | Age: 72
End: 2022-10-21
Payer: MEDICARE

## 2022-10-21 NOTE — TELEPHONE ENCOUNTER
[place] calling from [place]. Currently c/o severe body itching x 8 days that began the night he ate lobster, shrimp & fish dish 8 days ago. unrelieved by Calamine lotion and diphenhydramine spray. Forearm swelling. Wide spread hives to back to neck, head, forearms several days and leg hives began last night. Advised per triage protocol to go to nearest ED now for physician eval. V/u.  Reason for Disposition   Patient sounds very sick or weak to the triager    Additional Information   Negative: [1] Life-threatening reaction in the past to similar substance (e.g., food, insect bite/sting, medication, etc.) AND [2] < 2 hours since exposure   Negative: Wheezing, stridor, hoarseness, or difficulty breathing   Negative: [1] Tightness in the chest or throat AND [2] begins within 2 hours of exposure to allergic substance   Negative: Difficulty swallowing, drooling or slurred speech   Negative: Difficult to awaken or acting confused (e.g., disoriented, slurred speech)   Negative: Unresponsive, passed out or very weak   Negative: Other symptom of severe allergic reaction (Exception: Hives or facial swelling alone)   [1] Widespread hives AND [2] onset > 2 hours after exposure to high-risk allergen (e.g., sting, nuts, 1st dose of antibiotic)   Negative: [1] Life-threatening reaction (anaphylaxis) in the past to similar substance (e.g., food, insect bite/sting, chemical, etc.) AND [2] < 2 hours since exposure   Negative: Difficulty breathing or wheezing now   Negative: [1] Swollen tongue AND [2] rapid onset   Negative: [1] Hoarseness or cough now AND [2] rapid onset   Negative: Shock suspected (e.g., cold/pale/clammy skin, too weak to stand, low BP, rapid pulse)   Negative: Difficult to awaken or acting confused (e.g., disoriented, slurred speech)   Negative: Sounds like a life-threatening emergency to the triager   Negative: Swollen tongue   Negative: [1] Widespread hives, itching or facial swelling AND [2] onset < 2 hours of  exposure to high-risk allergen (e.g., sting, nuts, 1st dose of antibiotic)    Protocols used: [id number]-Y-XV, Hives-A-AH

## 2022-10-21 NOTE — TELEPHONE ENCOUNTER
Called pt and he stated he may have a possible rash. He had some crabs,lobster, and shrimp dish.  He was talking and then I did not hear anything after that. Said hello multiple times and nothing. Unsure what happened

## 2022-10-21 NOTE — TELEPHONE ENCOUNTER
----- Message from Demetria Sutton sent at 10/21/2022  2:29 PM CDT -----  Contact: [phone]  Patient is returning a phone call.  Who left a message for the patient: Lisa Suarez MA  Does patient know what this is regarding:  yes   Would you like a call back, or a response through your [facility] portal?:   call back   Comments:

## 2022-10-21 NOTE — TELEPHONE ENCOUNTER
Pt consumed lobster & shrimp 10/12/22. Rash is on both forearms, back of neck & on the back of the leg. Pt has been taking benadryl as well as applying calamine lotion daily. The pt denies any SOB, dyspnea or chest pain.     Please Advise

## 2022-10-21 NOTE — TELEPHONE ENCOUNTER
----- Message from Petr Yen sent at 10/21/2022 12:38 PM CDT -----  Pt would like to be called back asap regarding questions concerning his current condition(possible allergy rash).     Pt can be reached at [phone].    Thanks

## 2022-10-22 ENCOUNTER — HOSPITAL ENCOUNTER (EMERGENCY)
Facility: HOSPITAL | Age: 72
Discharge: HOME | End: 2022-10-22
Attending: EMERGENCY MEDICINE
Payer: MEDICARE

## 2022-10-22 VITALS
TEMPERATURE: 98 F | DIASTOLIC BLOOD PRESSURE: 67 MMHG | SYSTOLIC BLOOD PRESSURE: 129 MMHG | WEIGHT: 221 LBS | RESPIRATION RATE: 20 BRPM | HEART RATE: 86 BPM | OXYGEN SATURATION: 98 % | HEIGHT: 73 IN | BODY MASS INDEX: 29.29 KG/M2

## 2022-10-22 DIAGNOSIS — R21 RASH: Primary | ICD-10-CM

## 2022-10-22 PROCEDURE — 63600175 PHARM REV CODE 636 W HCPCS: Performed by: EMERGENCY MEDICINE

## 2022-10-22 PROCEDURE — 99283 EMERGENCY DEPT VISIT LOW MDM: CPT

## 2022-10-22 RX ORDER — PREDNISONE 20 MG/1
40 TABLET ORAL DAILY
Qty: 6 TABLET | Refills: 0 | Status: SHIPPED | OUTPATIENT
Start: 2022-10-23 | End: 2022-10-26

## 2022-10-22 RX ORDER — PREDNISONE 20 MG/1
40 TABLET ORAL
Status: COMPLETED | OUTPATIENT
Start: 2022-10-22 | End: 2022-10-22

## 2022-10-22 RX ADMIN — PREDNISONE 40 MG: 20 TABLET ORAL at 06:10

## 2022-10-22 NOTE — DISCHARGE INSTRUCTIONS
Closely monitor blood sugar and cover with insulin as necessary.  As we discussed today, steroids will tend to increase her blood sugar and make it more difficult to control.  Discontinue steroid course if your blood sugar cannot be controlled.

## 2022-10-22 NOTE — ED NOTES
Pt presents to the ED per POV with c/o a rash that has been intermittent since eating lobster 2 days ago. Pt denies a hx of seafood allergies. Pt denies airway obstruction, difficulty breathing, swelling. Pt is AAOX4, NAD

## 2022-10-22 NOTE — ED PROVIDER NOTES
Encounter Date: 10/22/2022    SCRIBE #1 NOTE: I, Day Ballard, am scribing for, and in the presence of,  Kt Hubbard MD.     History     Chief Complaint   Patient presents with    Allergic Reaction     Seafood on the 19 th      Time seen by provider: 5:54 PM on 10/22/2022    Jamarcus Mcqueen is a 72 y.o. male who presents to the ED with an itchy diffuse rash that started two days ago. The patient expresses concern that he is having a delayed reaction to seafood. He states he ate lobster the day prior to the onset of his symptoms. Patient is unaware of any allergy to lobster but he is allergic to crab. Patient has been taking Benadryl with some improvement in the rash yet the rash has continued to persist the past two days. He denies recent fever, abdominal pain, n/v/d, throat swelling, facial swelling, or any other Sx at this time. Patient has not used any new soap, detergents, or topical creams recently. PMHx of DM, CKD, HTN, and HLD. No pertinent PSHx.    The history is provided by the patient.   Review of patient's allergies indicates:   Allergen Reactions    Lipitor [atorvastatin]      Myalgia    Sulfa (sulfonamide antibiotics) Itching     Past Medical History:   Diagnosis Date    Allergy     Cataract     Diabetes mellitus type II     Diabetic retinopathy     DM type 2 causing CKD stage 3     Fever blister     Glaucoma     Hand arthritis 5/14/2020    Hyperlipidemia     Hypertension     Obesity (BMI 30-39.9) 6/4/2015     Past Surgical History:   Procedure Laterality Date    Avastin Injection Left 10/09/2014    Dr. Walter    CATARACT EXTRACTION W/  INTRAOCULAR LENS IMPLANT Left 05/18/2016        CATARACT EXTRACTION W/  INTRAOCULAR LENS IMPLANT Right 09/20/2017        COLONOSCOPY N/A 4/1/2016    Procedure: COLONOSCOPY;  Surgeon: Juan Pablo Pascual MD;  Location: [facility] ([address] [place] [address]);  Service: Endoscopy;  Laterality: N/A;  Do not cancel this order    COLONOSCOPY N/A 9/21/2020     Procedure: COLONOSCOPY;  Surgeon: Nehemias Armas MD;  Location: [facility] ([address] [place] [address]);  Service: Endoscopy;  Laterality: N/A;  -covid felice-tb    EYE SURGERY      HEMORRHOID SURGERY      REFRACTIVE SURGERY   and     TONSILLECTOMY       Family History   Problem Relation Age of Onset    Diabetes Mother     Hyperlipidemia Mother     Glaucoma Mother     No Known Problems Father     Diabetes Sister     No Known Problems Maternal Aunt     No Known Problems Maternal Uncle     No Known Problems Paternal Aunt     No Known Problems Paternal Uncle     No Known Problems Maternal Grandmother     No Known Problems Maternal Grandfather     No Known Problems Paternal Grandmother     No Known Problems Paternal Grandfather     No Known Problems Daughter     No Known Problems Son     Diabetes Sister         complications    No Known Problems Son     No Known Problems Son     No Known Problems Son     Diabetes Daughter     Amblyopia Neg Hx     Blindness Neg Hx     Cataracts Neg Hx     Macular degeneration Neg Hx     Retinal detachment Neg Hx     Strabismus Neg Hx     Melanoma Neg Hx     Cancer Neg Hx     Hypertension Neg Hx     Stroke Neg Hx     Thyroid disease Neg Hx      Social History     Tobacco Use    Smoking status: Former     Types: Cigarettes     Quit date: 1984     Years since quittin.7    Smokeless tobacco: Never   Substance Use Topics    Alcohol use: No     Alcohol/week: 0.0 standard drinks    Drug use: No     Review of Systems   Constitutional:  Negative for fever.   HENT:  Negative for facial swelling, sore throat and trouble swallowing.         Negative for throat swelling.   Respiratory:  Negative for shortness of breath.    Cardiovascular:  Negative for chest pain.   Gastrointestinal:  Negative for abdominal pain, diarrhea, nausea and vomiting.   Genitourinary:  Negative for dysuria.   Musculoskeletal:  Negative for back pain.   Skin:  Positive for rash.   Neurological:  Negative for  weakness.   Hematological:  Does not bruise/bleed easily.     Physical Exam     Initial Vitals [10/22/22 1742]   BP Pulse Resp Temp SpO2   129/67 86 20 97.9 °F (36.6 °C) 98 %      MAP       --         Physical Exam    Nursing note and vitals reviewed.  Constitutional: He appears well-developed and well-nourished. He is not diaphoretic. No distress.   HENT:   Head: Normocephalic and atraumatic.   Mouth/Throat: Uvula is midline and oropharynx is clear and moist. No oral lesions.   No lip or tongue swelling.   Eyes: Conjunctivae are normal.   Neck: Phonation normal. Neck supple. No thyroid mass present. No tracheal deviation present.   Cardiovascular:  Normal rate, regular rhythm, normal heart sounds and intact distal pulses.     Exam reveals no gallop and no friction rub.       No murmur heard.  Pulmonary/Chest: Breath sounds normal. He has no wheezes. He has no rhonchi. He has no rales.   Abdominal: Abdomen is soft. He exhibits no distension. There is no abdominal tenderness.   Musculoskeletal:         General: Normal range of motion.      Cervical back: Neck supple.     Neurological: He is alert and oriented to person, place, and time.   Skin: Capillary refill takes less than 2 seconds. Rash noted. No petechiae and no purpura noted. Rash is papular. Rash is not vesicular. No erythema.   Non confluent scattered rash consisting of papular lesions to the upper and lower extremities and to a lesser extent on the trunk. The rash spares the hands. No bullae.   Psychiatric: He has a normal mood and affect. Thought content normal.       ED Course   Procedures  Labs Reviewed - No data to display       Imaging Results    None          Medications   predniSONE tablet 40 mg (40 mg Oral Given 10/22/22 1842)     Medical Decision Making:   History:   Old Medical Records: I decided to obtain old medical records.        Scribe Attestation:   Scribe #1: I performed the above scribed service and the documentation accurately describes  the services I performed. I attest to the accuracy of the note.            I, Dr. Kt Hubbard, personally performed the services described in this documentation. All medical record entries made by the scribe were at my direction and in my presence.  I have reviewed the chart and agree that the record reflects my personal performance and is accurate and complete. Kt Hubbard MD.  11:00 PM 10/22/2022    Jaamrcus Mcqueen is a 72 y.o. male presenting with rash in this otherwise well-appearing patient.  No sign of end-organ dysfunction.  Atypical for food allergy as exposure was several days ago with persistent symptoms.  He has some benefit with antihistamines.  I did discuss systemic steroids with the patient with risk of hyperglycemia.  He is strongly in favor of steroids.  I will initiate prednisone with instructions to discontinue steroids if glycemic control is not achievable with home medicine.  I have very low suspicion for emergent, life-threatening process such as SJS or TEN.  There is no sign of anaphylaxis.  Follow-up with dermatology.  Return precautions reviewed.         Clinical Impression:   Final diagnoses:  [R21] Rash (Primary)      ED Disposition Condition    Discharge Stable          ED Prescriptions       Medication Sig Dispense Start Date End Date Auth. Provider    predniSONE (DELTASONE) 20 MG tablet Take 2 tablets (40 mg total) by mouth once daily. for 3 days 6 tablet 10/23/2022 10/26/2022 Kt Hubbard MD          Follow-up Information       Follow up With Specialties Details Why Contact Info    Kt Stacy MD Family Medicine, Sports Medicine  Next week [address] [facility] [place] [address]  [phone]      Katie Seo MD Dermatology  Dermatology, next week [address] [place] [address]  [phone]      Serina Andrade MD Dermatology  Additional dermatology option [address] [place] [address] Dr Shireen [place] [address]  [phone]               Kt  CRISTINA Hubbard MD  10/22/22 5668

## 2022-11-11 DIAGNOSIS — N18.31 TYPE 2 DIABETES MELLITUS WITH STAGE 3A CHRONIC KIDNEY DISEASE, WITH LONG-TERM CURRENT USE OF INSULIN: Primary | ICD-10-CM

## 2022-11-11 DIAGNOSIS — E11.22 TYPE 2 DIABETES MELLITUS WITH STAGE 3A CHRONIC KIDNEY DISEASE, WITH LONG-TERM CURRENT USE OF INSULIN: Primary | ICD-10-CM

## 2022-11-11 DIAGNOSIS — Z79.4 TYPE 2 DIABETES MELLITUS WITH STAGE 3A CHRONIC KIDNEY DISEASE, WITH LONG-TERM CURRENT USE OF INSULIN: Primary | ICD-10-CM

## 2022-11-11 RX ORDER — INSULIN LISPRO 100 [IU]/ML
20 INJECTION, SOLUTION INTRAVENOUS; SUBCUTANEOUS
COMMUNITY
End: 2022-11-11 | Stop reason: SDUPTHER

## 2022-11-11 NOTE — TELEPHONE ENCOUNTER
Spoke w/ Gladys Abreu from [facility] stating she needed a refill prescription for pt Humalog KWKPN quick pen 100 unit/mL. Pended new prescription request

## 2022-11-14 RX ORDER — INSULIN LISPRO 100 [IU]/ML
INJECTION, SOLUTION INTRAVENOUS; SUBCUTANEOUS
Qty: 15 ML | Refills: 3 | Status: SHIPPED | OUTPATIENT
Start: 2022-11-14 | End: 2023-12-05 | Stop reason: SDUPTHER

## 2023-02-15 DIAGNOSIS — E11.9 TYPE 2 DIABETES MELLITUS WITHOUT COMPLICATION: ICD-10-CM

## 2023-02-20 ENCOUNTER — PATIENT MESSAGE (OUTPATIENT)
Dept: ADMINISTRATIVE | Facility: HOSPITAL | Age: 73
End: 2023-02-20
Payer: MEDICARE

## 2023-08-02 ENCOUNTER — PES CALL (OUTPATIENT)
Dept: ADMINISTRATIVE | Facility: CLINIC | Age: 73
End: 2023-08-02
Payer: MEDICARE

## 2023-08-30 DIAGNOSIS — E11.9 TYPE 2 DIABETES MELLITUS WITHOUT COMPLICATION: ICD-10-CM

## 2023-09-05 ENCOUNTER — PATIENT MESSAGE (OUTPATIENT)
Dept: ADMINISTRATIVE | Facility: HOSPITAL | Age: 73
End: 2023-09-05
Payer: MEDICARE

## 2023-11-30 ENCOUNTER — PATIENT OUTREACH (OUTPATIENT)
Dept: ADMINISTRATIVE | Facility: HOSPITAL | Age: 73
End: 2023-11-30
Payer: MEDICARE

## 2023-11-30 NOTE — PROGRESS NOTES
Health Maintenance Due   Topic Date Due    RSV Vaccine (Age 60+ and Pregnant patients) (1 - 1-dose 60+ series) Never done    Shingles Vaccine (2 of 3) 09/18/2013    Eye Exam  05/21/2021    Foot Exam  09/29/2022    Diabetes Urine Screening  12/14/2022    Influenza Vaccine (1) 09/01/2023    COVID-19 Vaccine (3 - 2023-24 season) 09/01/2023    Colorectal Cancer Screening  09/21/2023      Chart reviewed. Triggered LINKS. Updated Care Everywhere.     Leslie Alejandra CMA  Population Health Care Coordinator  Primary Care Team

## 2023-12-05 ENCOUNTER — OFFICE VISIT (OUTPATIENT)
Dept: INTERNAL MEDICINE | Facility: CLINIC | Age: 73
End: 2023-12-05
Attending: FAMILY MEDICINE
Payer: MEDICARE

## 2023-12-05 VITALS
WEIGHT: 220.88 LBS | SYSTOLIC BLOOD PRESSURE: 108 MMHG | HEART RATE: 66 BPM | BODY MASS INDEX: 29.27 KG/M2 | OXYGEN SATURATION: 98 % | HEIGHT: 73 IN | DIASTOLIC BLOOD PRESSURE: 60 MMHG

## 2023-12-05 DIAGNOSIS — E11.69 TYPE 2 DIABETES MELLITUS WITH OTHER SPECIFIED COMPLICATION, WITH LONG-TERM CURRENT USE OF INSULIN: Primary | ICD-10-CM

## 2023-12-05 DIAGNOSIS — H43.12 VITREOUS HEMORRHAGE, LEFT EYE: ICD-10-CM

## 2023-12-05 DIAGNOSIS — N18.31 TYPE 2 DIABETES MELLITUS WITH STAGE 3A CHRONIC KIDNEY DISEASE, WITH LONG-TERM CURRENT USE OF INSULIN: ICD-10-CM

## 2023-12-05 DIAGNOSIS — Z79.4 TYPE 2 DIABETES MELLITUS WITH OTHER SPECIFIED COMPLICATION, WITH LONG-TERM CURRENT USE OF INSULIN: Primary | ICD-10-CM

## 2023-12-05 DIAGNOSIS — E78.5 HYPERLIPIDEMIA, UNSPECIFIED HYPERLIPIDEMIA TYPE: ICD-10-CM

## 2023-12-05 DIAGNOSIS — E78.5 HYPERLIPIDEMIA ASSOCIATED WITH TYPE 2 DIABETES MELLITUS: ICD-10-CM

## 2023-12-05 DIAGNOSIS — E11.59 HYPERTENSION ASSOCIATED WITH DIABETES: ICD-10-CM

## 2023-12-05 DIAGNOSIS — I15.2 HYPERTENSION ASSOCIATED WITH DIABETES: ICD-10-CM

## 2023-12-05 DIAGNOSIS — E11.22 TYPE 2 DIABETES MELLITUS WITH STAGE 3A CHRONIC KIDNEY DISEASE, WITH LONG-TERM CURRENT USE OF INSULIN: ICD-10-CM

## 2023-12-05 DIAGNOSIS — Z12.5 PROSTATE CANCER SCREENING: ICD-10-CM

## 2023-12-05 DIAGNOSIS — E11.69 HYPERLIPIDEMIA ASSOCIATED WITH TYPE 2 DIABETES MELLITUS: ICD-10-CM

## 2023-12-05 DIAGNOSIS — I70.0 AORTIC ATHEROSCLEROSIS: ICD-10-CM

## 2023-12-05 DIAGNOSIS — K63.5 POLYP OF COLON, UNSPECIFIED PART OF COLON, UNSPECIFIED TYPE: ICD-10-CM

## 2023-12-05 DIAGNOSIS — Z12.11 SCREENING FOR COLON CANCER: ICD-10-CM

## 2023-12-05 DIAGNOSIS — N18.31 STAGE 3A CHRONIC KIDNEY DISEASE: ICD-10-CM

## 2023-12-05 DIAGNOSIS — E11.22 TYPE 2 DIABETES MELLITUS WITH CHRONIC KIDNEY DISEASE, WITH LONG-TERM CURRENT USE OF INSULIN, UNSPECIFIED CKD STAGE: Chronic | ICD-10-CM

## 2023-12-05 DIAGNOSIS — Z79.4 TYPE 2 DIABETES MELLITUS WITH CHRONIC KIDNEY DISEASE, WITH LONG-TERM CURRENT USE OF INSULIN, UNSPECIFIED CKD STAGE: Chronic | ICD-10-CM

## 2023-12-05 DIAGNOSIS — E11.3393 MODERATE NONPROLIFERATIVE DIABETIC RETINOPATHY OF BOTH EYES WITHOUT MACULAR EDEMA ASSOCIATED WITH TYPE 2 DIABETES MELLITUS: ICD-10-CM

## 2023-12-05 DIAGNOSIS — Z79.4 TYPE 2 DIABETES MELLITUS WITH STAGE 3A CHRONIC KIDNEY DISEASE, WITH LONG-TERM CURRENT USE OF INSULIN: ICD-10-CM

## 2023-12-05 PROBLEM — E11.9 TYPE 2 DIABETES MELLITUS, WITH LONG-TERM CURRENT USE OF INSULIN: Status: ACTIVE | Noted: 2021-07-23

## 2023-12-05 PROBLEM — E11.3592: Status: RESOLVED | Noted: 2017-07-11 | Resolved: 2023-12-05

## 2023-12-05 PROBLEM — E11.3391: Status: RESOLVED | Noted: 2017-07-11 | Resolved: 2023-12-05

## 2023-12-05 PROCEDURE — 99214 PR OFFICE/OUTPT VISIT, EST, LEVL IV, 30-39 MIN: ICD-10-PCS | Mod: S$PBB,,, | Performed by: FAMILY MEDICINE

## 2023-12-05 PROCEDURE — 99999 PR PBB SHADOW E&M-EST. PATIENT-LVL V: CPT | Mod: PBBFAC,,, | Performed by: FAMILY MEDICINE

## 2023-12-05 PROCEDURE — 99214 OFFICE O/P EST MOD 30 MIN: CPT | Mod: S$PBB,,, | Performed by: FAMILY MEDICINE

## 2023-12-05 PROCEDURE — 99215 OFFICE O/P EST HI 40 MIN: CPT | Mod: PBBFAC | Performed by: FAMILY MEDICINE

## 2023-12-05 PROCEDURE — 99999 PR PBB SHADOW E&M-EST. PATIENT-LVL V: ICD-10-PCS | Mod: PBBFAC,,, | Performed by: FAMILY MEDICINE

## 2023-12-05 RX ORDER — DULAGLUTIDE 1.5 MG/.5ML
INJECTION, SOLUTION SUBCUTANEOUS
COMMUNITY

## 2023-12-05 RX ORDER — INSULIN GLARGINE 100 [IU]/ML
30 INJECTION, SOLUTION SUBCUTANEOUS NIGHTLY
Qty: 30 ML | Refills: 0 | Status: SHIPPED | OUTPATIENT
Start: 2023-12-05

## 2023-12-05 RX ORDER — ROSUVASTATIN CALCIUM 10 MG/1
10 TABLET, COATED ORAL DAILY
Qty: 90 TABLET | Refills: 1 | Status: SHIPPED | OUTPATIENT
Start: 2023-12-05 | End: 2024-02-05 | Stop reason: SDUPTHER

## 2023-12-05 RX ORDER — INSULIN LISPRO 100 [IU]/ML
INJECTION, SOLUTION INTRAVENOUS; SUBCUTANEOUS
Qty: 15 ML | Refills: 0 | Status: SHIPPED | OUTPATIENT
Start: 2023-12-05

## 2023-12-05 RX ORDER — EZETIMIBE 10 MG/1
10 TABLET ORAL DAILY
Qty: 90 TABLET | Refills: 1 | Status: SHIPPED | OUTPATIENT
Start: 2023-12-05 | End: 2024-02-05

## 2023-12-05 NOTE — PROGRESS NOTES
Subjective:       Patient ID: Jamarcus Mcqueen is a 73 y.o. male.    Chief Complaint: Annual Exam    Established patient for an annual wellness check/physical exam and also chronic disease management. Specific complaints - see dictation, M*model entries and please see ROS.  Past, Surgical, Family, Social Histories; Medications, Allergies reviewed and reconciled.  Health maintenance file reviewed and addressed items due. Recent applicable lab, imaging and cardiovascular results reviewed.  Problem list items reviewed and modified or added entries (in the overview section) may not be transcribed into this encounter note due to note writer format.        Established patient follows up for management of chronic medical illnesses with complaints today. Please see dictation and ROS for interval problems, specific complaints and disease management discussion.    Past, Surgical, Family, Social, Histories; Medications, allergies reviewed and reconciled.  Health maintenance file reviewed and addressed items due. Recent applicable lab, imaging and cardiovascular results reviewed.  Problem list items reviewed and modified or added entries (in the overview section) may not be transcribed into this encounter note due to note writer format.      Patient is lost to follow-up.  Maybe receiving some care at [facility].  Lives in [place].  States that he is taking long-term insulin and Trulicity.  Victoza is listed in chart, unclear if he is still taking that or not.  That would be a redundant medication.    No recent ophthalmology follow-ups.  History of retinal bleed.    Will order laboratory today, attempt to reschedule with his previous endocrinologist.  I feel that his insulin management would be best served at that level but we will provide some basic refills today.  Follow-up in 2 months for compliance assessment.  Will switch lipid medication to rosuvastatin, a recent external lab with  was noted.  Prior  muscle aches which he describes as mild with atorvastatin.  Was on simvastatin from prior PCP.        Review of Systems   Constitutional:  Negative for appetite change, chills, diaphoresis, fatigue and fever.   HENT:  Negative for congestion, postnasal drip, rhinorrhea, sore throat and trouble swallowing.    Eyes:  Negative for visual disturbance.   Respiratory:  Negative for cough, choking, chest tightness, shortness of breath and wheezing.    Cardiovascular:  Negative for chest pain and leg swelling.   Gastrointestinal:  Negative for abdominal distention, abdominal pain, diarrhea, nausea and vomiting.   Genitourinary:  Negative for difficulty urinating and hematuria.   Musculoskeletal:  Negative for arthralgias and myalgias.   Skin:  Negative for rash.   Neurological:  Negative for weakness, light-headedness and headaches.   Psychiatric/Behavioral:  Negative for confusion and dysphoric mood.        Objective:      Physical Exam  Vitals and nursing note reviewed.   Constitutional:       Appearance: He is well-developed. He is not diaphoretic.   Eyes:      General: No scleral icterus.  Neck:      Thyroid: No thyromegaly.      Vascular: No carotid bruit or JVD.      Trachea: No tracheal deviation.   Cardiovascular:      Rate and Rhythm: Normal rate and regular rhythm.      Heart sounds: Heart sounds are distant. No murmur heard.     No friction rub. No gallop.   Pulmonary:      Effort: Pulmonary effort is normal. No respiratory distress.      Breath sounds: Normal breath sounds. No wheezing or rales.   Abdominal:      General: There is no distension.      Palpations: Abdomen is soft. There is no mass.      Tenderness: There is no abdominal tenderness. There is no guarding or rebound.   Musculoskeletal:      Cervical back: Normal range of motion and neck supple.   Lymphadenopathy:      Cervical: No cervical adenopathy.   Skin:     General: Skin is warm and dry.      Findings: No erythema or rash.   Neurological:       Mental Status: He is alert and oriented to person, place, and time.      Cranial Nerves: No cranial nerve deficit.      Motor: No tremor.      Coordination: Coordination normal.      Gait: Gait normal.   Psychiatric:         Behavior: Behavior normal.         Thought Content: Thought content normal.         Judgment: Judgment normal.         Assessment:       1. Type 2 diabetes mellitus with other specified complication, with long-term current use of insulin    2. Stage 3a chronic kidney disease    3. Aortic atherosclerosis    4. Vitreous hemorrhage, left eye    5. Hypertension associated with diabetes    6. Hyperlipidemia associated with type 2 diabetes mellitus    7. Screening for colon cancer    8. Hyperlipidemia, unspecified hyperlipidemia type    9. Prostate cancer screening    10. Moderate nonproliferative diabetic retinopathy of both eyes without macular edema associated with type 2 diabetes mellitus    11. Polyp of colon, unspecified part of colon, unspecified type    12. Type 2 diabetes mellitus with chronic kidney disease, with long-term current use of insulin, unspecified CKD stage    13. Type 2 diabetes mellitus with stage 3a chronic kidney disease, with long-term current use of insulin        Plan:     Medication List with Changes/Refills   New Medications    ROSUVASTATIN (CRESTOR) 10 MG TABLET    Take 1 tablet (10 mg total) by mouth once daily.   Current Medications    B COMPLEX VITAMINS CAPSULE    Take 1 capsule by mouth once daily.    DULAGLUTIDE (TRULICITY) 1.5 MG/0.5 ML PEN INJECTOR    Inject into the skin every 7 days.    LATANOPROST 0.005 % OPHTHALMIC SOLUTION    INSTILL 1 DROP IN BOTH EYES EVERY EVENING    LIRAGLUTIDE 0.6 MG/0.1 ML, 18 MG/3 ML, SUBQ PNIJ (VICTOZA 2-YAS) 0.6 MG/0.1 ML (18 MG/3 ML) PNIJ PEN    Inject 0.6 mg into the skin daily for 2 weeks then increase to 1.2 mg thereafter    LOSARTAN (COZAAR) 25 MG TABLET    Take 1 tablet (25 mg total) by mouth once daily.    TIMOLOL MALEATE 0.5%  (TIMOPTIC-XE) 0.5 % SOLG    INSTILL 1 DROP IN BOTH EYES DAILY    VITAMIN D (VITAMIN D3) 1000 UNITS TAB    Take 1,000 Units by mouth once daily.   Changed and/or Refilled Medications    Modified Medication Previous Medication    EZETIMIBE (ZETIA) 10 MG TABLET ezetimibe (ZETIA) 10 mg tablet       Take 1 tablet (10 mg total) by mouth once daily.    Take 1 tablet (10 mg total) by mouth once daily.    INSULIN (LANTUS SOLOSTAR U-100 INSULIN) GLARGINE 100 UNITS/ML SUBQ PEN insulin (LANTUS SOLOSTAR U-100 INSULIN) glargine 100 units/mL (3mL) SubQ pen       Inject 30 Units into the skin every evening.    Inject 30 Units into the skin every evening.    INSULIN LISPRO (HUMALOG U-100 INSULIN) 100 UNIT/ML INJECTION insulin lispro (HUMALOG U-100 INSULIN) 100 unit/mL injection       Take 3 - 6 units subcutaneously before meals. Needs appointment for future refills    Take 3 - 6 units subcutaneously before meals. Needs appointment for future refills   Discontinued Medications    KETOCONAZOLE (NIZORAL) 2 % CREAM    Apply topically once daily.    SIMVASTATIN (ZOCOR) 40 MG TABLET    Take 1 tablet (40 mg total) by mouth every evening.    TRIAMCINOLONE ACETONIDE 0.1% (KENALOG) 0.1 % CREAM    Apply topically 2 (two) times daily.    VARDENAFIL (LEVITRA) 20 MG TABLET    Take 1 tablet (20 mg total) by mouth daily as needed for Erectile Dysfunction.     1. Type 2 diabetes mellitus with other specified complication, with long-term current use of insulin  -     Ambulatory referral/consult to Podiatry; Future; Expected date: 12/12/2023  -     Hemoglobin A1C; Future; Expected date: 12/05/2023  -     Comprehensive Metabolic Panel; Future; Expected date: 12/05/2023  -     Microalbumin/Creatinine Ratio, Urine; Future; Expected date: 12/05/2023  -     Ambulatory referral/consult to Endocrinology  -     Ambulatory referral/consult to Ophthalmology; Future; Expected date: 12/12/2023    2. Stage 3a chronic kidney disease    3. Aortic  atherosclerosis  Overview:  -noted on CXR 7/8/2021 on statin      4. Vitreous hemorrhage, left eye    5. Hypertension associated with diabetes    6. Hyperlipidemia associated with type 2 diabetes mellitus  Assessment & Plan:  -previous muscle side effect w/ lipitor. Will stop simvastatin and start crestor 10, titrate up if tolerated. Cont zetia.    Orders:  -     Lipid Panel; Future; Expected date: 12/05/2023  -     ezetimibe (ZETIA) 10 mg tablet; Take 1 tablet (10 mg total) by mouth once daily.  Dispense: 90 tablet; Refill: 1  -     rosuvastatin (CRESTOR) 10 MG tablet; Take 1 tablet (10 mg total) by mouth once daily.  Dispense: 90 tablet; Refill: 1    7. Screening for colon cancer  -     Ambulatory referral/consult to Endo Procedure ; Future; Expected date: 12/06/2023    8. Hyperlipidemia, unspecified hyperlipidemia type  -     ezetimibe (ZETIA) 10 mg tablet; Take 1 tablet (10 mg total) by mouth once daily.  Dispense: 90 tablet; Refill: 1    9. Prostate cancer screening  -     PSA, Screening; Future; Expected date: 12/05/2023    10. Moderate nonproliferative diabetic retinopathy of both eyes without macular edema associated with type 2 diabetes mellitus  -     Ambulatory referral/consult to Ophthalmology; Future; Expected date: 12/12/2023    11. Polyp of colon, unspecified part of colon, unspecified type  -     Ambulatory referral/consult to Endo Procedure ; Future; Expected date: 12/06/2023    12. Type 2 diabetes mellitus with chronic kidney disease, with long-term current use of insulin, unspecified CKD stage  -     insulin (LANTUS SOLOSTAR U-100 INSULIN) glargine 100 units/mL SubQ pen; Inject 30 Units into the skin every evening.  Dispense: 30 mL; Refill: 0    13. Type 2 diabetes mellitus with stage 3a chronic kidney disease, with long-term current use of insulin  -     insulin lispro (HUMALOG U-100 INSULIN) 100 unit/mL injection; Take 3 - 6 units subcutaneously before meals. Needs appointment  for future refills  Dispense: 15 mL; Refill: 0      See meds, orders, follow up, routing and instructions sections of encounter and AVS. Discussed with patient and provided on AVS.    Discussed diet and exercise as therapeutic modalities for metabolic and other conditions. Provided patient information, which are included as links on the AVS for detailed information.    Lab Results   Component Value Date     12/14/2021    K 4.5 12/14/2021     12/14/2021    BUN 16 12/14/2021    CREATININE 1.5 (H) 12/14/2021    GLU 72 12/14/2021    HGBA1C 7.6 (H) 12/14/2021    MG 2.1 01/04/2014    AST 28 12/14/2021    ALT 27 12/14/2021    ALBUMIN 3.8 12/14/2021    PROT 6.9 12/14/2021    BILITOT 1.2 (H) 12/14/2021    CHOL 185 07/08/2021    HDL 63 07/08/2021    LDLCALC 111.0 07/08/2021    TRIG 55 07/08/2021    WBC 5.87 01/25/2019    HGB 15.4 01/25/2019    HCT 48.4 01/25/2019     01/25/2019    PSA 2.2 07/08/2021    TSH 1.506 03/24/2016         Diabetes Management Status    Statin: Taking  ACE/ARB: Taking    Screening or Prevention Patient's value Goal Complete/Controlled?   HgA1C Testing and Control   Lab Results   Component Value Date    HGBA1C 7.6 (H) 12/14/2021      Annually/Less than 8% No   Lipid profile : 08/25/2023 Annually No   LDL control Lab Results   Component Value Date    LDLCALC 111.0 07/08/2021    Annually/Less than 100 mg/dl  No   Nephropathy screening Lab Results   Component Value Date    LABMICR <5.0 12/14/2021     Lab Results   Component Value Date    PROTEINUA Negative 07/08/2021     Lab Results   Component Value Date    UTPCR Unable to calculate 07/08/2021      Annually No   Blood pressure BP Readings from Last 1 Encounters:   12/05/23 108/60    Less than 140/90 Yes   Dilated retinal exam : 05/21/2020 Annually No   Foot exam   : 09/29/2021 Annually No       Reengage care and RTC in 2 months

## 2023-12-05 NOTE — PATIENT INSTRUCTIONS
Schedule lab orders for today.      If not contacted in a couple weeks by colonoscopy scheduling department - call Colonoscopy Scheduling Number - [phone].

## 2023-12-05 NOTE — ASSESSMENT & PLAN NOTE
-previous muscle side effect w/ lipitor. Will stop simvastatin and start crestor 10, titrate up if tolerated. Cont zetia.

## 2023-12-08 ENCOUNTER — CLINICAL SUPPORT (OUTPATIENT)
Dept: ENDOSCOPY | Facility: HOSPITAL | Age: 73
End: 2023-12-08
Attending: FAMILY MEDICINE
Payer: MEDICARE

## 2023-12-08 ENCOUNTER — TELEPHONE (OUTPATIENT)
Dept: ENDOSCOPY | Facility: HOSPITAL | Age: 73
End: 2023-12-08

## 2023-12-08 DIAGNOSIS — K63.5 POLYP OF COLON, UNSPECIFIED PART OF COLON, UNSPECIFIED TYPE: ICD-10-CM

## 2023-12-08 DIAGNOSIS — Z12.11 SCREENING FOR COLON CANCER: ICD-10-CM

## 2023-12-08 NOTE — PLAN OF CARE
We attempted to reach you for your scheduled PAT appointment to schedule your colonoscopy. Left voicemail message. Please contact Endoscopy Scheduling at # [phone].

## 2023-12-08 NOTE — TELEPHONE ENCOUNTER
Attempted to contact patient to schedule colonoscopy. Left voicemail message with information to call Endoscopy scheduling department at [phone] to schedule procedure. Also, sent message via patient portal. New PAT appointment scheduled to call patient.

## 2023-12-11 ENCOUNTER — TELEPHONE (OUTPATIENT)
Dept: PODIATRY | Facility: CLINIC | Age: 73
End: 2023-12-11
Payer: MEDICARE

## 2023-12-11 ENCOUNTER — PATIENT MESSAGE (OUTPATIENT)
Dept: PODIATRY | Facility: CLINIC | Age: 73
End: 2023-12-11
Payer: MEDICARE

## 2023-12-11 NOTE — TELEPHONE ENCOUNTER
Left voice message for patient to give our office a call back at [phone]. Help with scheduling appointment scheduled incorrectly DM nail care Dr. Maier.

## 2024-01-11 ENCOUNTER — LAB VISIT (OUTPATIENT)
Dept: LAB | Facility: HOSPITAL | Age: 74
End: 2024-01-11
Attending: FAMILY MEDICINE
Payer: MEDICARE

## 2024-01-11 ENCOUNTER — TELEPHONE (OUTPATIENT)
Dept: ENDOSCOPY | Facility: HOSPITAL | Age: 74
End: 2024-01-11
Payer: MEDICARE

## 2024-01-11 DIAGNOSIS — E11.69 TYPE 2 DIABETES MELLITUS WITH OTHER SPECIFIED COMPLICATION, WITH LONG-TERM CURRENT USE OF INSULIN: ICD-10-CM

## 2024-01-11 DIAGNOSIS — E78.5 HYPERLIPIDEMIA ASSOCIATED WITH TYPE 2 DIABETES MELLITUS: ICD-10-CM

## 2024-01-11 DIAGNOSIS — E11.69 HYPERLIPIDEMIA ASSOCIATED WITH TYPE 2 DIABETES MELLITUS: ICD-10-CM

## 2024-01-11 DIAGNOSIS — Z79.4 TYPE 2 DIABETES MELLITUS WITH OTHER SPECIFIED COMPLICATION, WITH LONG-TERM CURRENT USE OF INSULIN: ICD-10-CM

## 2024-01-11 DIAGNOSIS — Z12.5 PROSTATE CANCER SCREENING: ICD-10-CM

## 2024-01-11 LAB
ALBUMIN SERPL BCP-MCNC: 3.9 G/DL (ref 3.5–5.2)
ALP SERPL-CCNC: 71 U/L (ref 55–135)
ALT SERPL W/O P-5'-P-CCNC: 33 U/L (ref 10–44)
ANION GAP SERPL CALC-SCNC: 6 MMOL/L (ref 8–16)
AST SERPL-CCNC: 37 U/L (ref 10–40)
BILIRUB SERPL-MCNC: 1.2 MG/DL (ref 0.1–1)
BUN SERPL-MCNC: 15 MG/DL (ref 8–23)
CALCIUM SERPL-MCNC: 9.8 MG/DL (ref 8.7–10.5)
CHLORIDE SERPL-SCNC: 107 MMOL/L (ref 95–110)
CHOLEST SERPL-MCNC: 160 MG/DL (ref 120–199)
CHOLEST/HDLC SERPL: 2.5 {RATIO} (ref 2–5)
CO2 SERPL-SCNC: 29 MMOL/L (ref 23–29)
COMPLEXED PSA SERPL-MCNC: 2.6 NG/ML (ref 0–4)
CREAT SERPL-MCNC: 1.5 MG/DL (ref 0.5–1.4)
EST. GFR  (NO RACE VARIABLE): 49.2 ML/MIN/1.73 M^2
ESTIMATED AVG GLUCOSE: 140 MG/DL (ref 68–131)
GLUCOSE SERPL-MCNC: 83 MG/DL (ref 70–110)
HBA1C MFR BLD: 6.5 % (ref 4–5.6)
HDLC SERPL-MCNC: 64 MG/DL (ref 40–75)
HDLC SERPL: 40 % (ref 20–50)
LDLC SERPL CALC-MCNC: 86.4 MG/DL (ref 63–159)
NONHDLC SERPL-MCNC: 96 MG/DL
POTASSIUM SERPL-SCNC: 4.2 MMOL/L (ref 3.5–5.1)
PROT SERPL-MCNC: 6.8 G/DL (ref 6–8.4)
SODIUM SERPL-SCNC: 142 MMOL/L (ref 136–145)
TRIGL SERPL-MCNC: 48 MG/DL (ref 30–150)

## 2024-01-11 PROCEDURE — 80061 LIPID PANEL: CPT | Performed by: FAMILY MEDICINE

## 2024-01-11 PROCEDURE — 84153 ASSAY OF PSA TOTAL: CPT | Performed by: FAMILY MEDICINE

## 2024-01-11 PROCEDURE — 36415 COLL VENOUS BLD VENIPUNCTURE: CPT | Performed by: FAMILY MEDICINE

## 2024-01-11 PROCEDURE — 80053 COMPREHEN METABOLIC PANEL: CPT | Performed by: FAMILY MEDICINE

## 2024-01-11 PROCEDURE — 83036 HEMOGLOBIN GLYCOSYLATED A1C: CPT | Performed by: FAMILY MEDICINE

## 2024-01-11 NOTE — TELEPHONE ENCOUNTER
Returned pt call from missed pat to schedule colonoscopy. New pat appt created for 1/9/24.pt verbalized understanding

## 2024-01-12 DIAGNOSIS — Z00.00 ENCOUNTER FOR MEDICARE ANNUAL WELLNESS EXAM: ICD-10-CM

## 2024-01-14 ENCOUNTER — TELEPHONE (OUTPATIENT)
Dept: INTERNAL MEDICINE | Facility: CLINIC | Age: 74
End: 2024-01-14
Payer: MEDICARE

## 2024-01-14 DIAGNOSIS — N18.31 STAGE 3A CHRONIC KIDNEY DISEASE: Primary | ICD-10-CM

## 2024-01-14 NOTE — TELEPHONE ENCOUNTER
Please call patient and explain that the test(s) show stable chronic kidney disease.    I would like to refer to the nephrology department for further evaluation and treatment.    Please see referral orders and please call patient to schedule.     Thank you.

## 2024-02-05 ENCOUNTER — HOSPITAL ENCOUNTER (OUTPATIENT)
Dept: RADIOLOGY | Facility: HOSPITAL | Age: 74
Discharge: HOME | End: 2024-02-05
Attending: INTERNAL MEDICINE
Payer: MEDICARE

## 2024-02-05 ENCOUNTER — OFFICE VISIT (OUTPATIENT)
Dept: INTERNAL MEDICINE | Facility: CLINIC | Age: 74
End: 2024-02-05
Attending: FAMILY MEDICINE
Payer: MEDICARE

## 2024-02-05 ENCOUNTER — OFFICE VISIT (OUTPATIENT)
Dept: NEPHROLOGY | Facility: CLINIC | Age: 74
End: 2024-02-05
Attending: FAMILY MEDICINE
Payer: MEDICARE

## 2024-02-05 ENCOUNTER — OFFICE VISIT (OUTPATIENT)
Dept: PODIATRY | Facility: CLINIC | Age: 74
End: 2024-02-05
Payer: MEDICARE

## 2024-02-05 VITALS
HEART RATE: 74 BPM | SYSTOLIC BLOOD PRESSURE: 120 MMHG | BODY MASS INDEX: 28.8 KG/M2 | DIASTOLIC BLOOD PRESSURE: 75 MMHG | WEIGHT: 218.25 LBS | OXYGEN SATURATION: 99 %

## 2024-02-05 VITALS
DIASTOLIC BLOOD PRESSURE: 68 MMHG | SYSTOLIC BLOOD PRESSURE: 115 MMHG | HEIGHT: 73 IN | WEIGHT: 220.25 LBS | HEART RATE: 66 BPM | BODY MASS INDEX: 29.19 KG/M2

## 2024-02-05 VITALS
HEIGHT: 72 IN | SYSTOLIC BLOOD PRESSURE: 114 MMHG | BODY MASS INDEX: 29.5 KG/M2 | DIASTOLIC BLOOD PRESSURE: 58 MMHG | OXYGEN SATURATION: 99 % | HEART RATE: 69 BPM | WEIGHT: 217.81 LBS

## 2024-02-05 DIAGNOSIS — N18.31 STAGE 3A CHRONIC KIDNEY DISEASE: ICD-10-CM

## 2024-02-05 DIAGNOSIS — E11.59 HYPERTENSION ASSOCIATED WITH DIABETES: ICD-10-CM

## 2024-02-05 DIAGNOSIS — Z79.4 TYPE 2 DIABETES MELLITUS WITH OTHER SPECIFIED COMPLICATION, WITH LONG-TERM CURRENT USE OF INSULIN: Primary | ICD-10-CM

## 2024-02-05 DIAGNOSIS — E11.42 TYPE 2 DIABETES MELLITUS WITH DIABETIC POLYNEUROPATHY, UNSPECIFIED WHETHER LONG TERM INSULIN USE: Primary | Chronic | ICD-10-CM

## 2024-02-05 DIAGNOSIS — E11.69 TYPE 2 DIABETES MELLITUS WITH OTHER SPECIFIED COMPLICATION, WITH LONG-TERM CURRENT USE OF INSULIN: Primary | ICD-10-CM

## 2024-02-05 DIAGNOSIS — N18.31 STAGE 3A CHRONIC KIDNEY DISEASE: Primary | ICD-10-CM

## 2024-02-05 DIAGNOSIS — E11.21 DM KIDNEY DISEASE: ICD-10-CM

## 2024-02-05 DIAGNOSIS — E11.69 TYPE 2 DIABETES MELLITUS WITH OTHER SPECIFIED COMPLICATION, WITH LONG-TERM CURRENT USE OF INSULIN: ICD-10-CM

## 2024-02-05 DIAGNOSIS — I15.2 HYPERTENSION ASSOCIATED WITH DIABETES: ICD-10-CM

## 2024-02-05 DIAGNOSIS — E78.5 HYPERLIPIDEMIA ASSOCIATED WITH TYPE 2 DIABETES MELLITUS: ICD-10-CM

## 2024-02-05 DIAGNOSIS — E78.5 HYPERLIPIDEMIA, UNSPECIFIED HYPERLIPIDEMIA TYPE: ICD-10-CM

## 2024-02-05 DIAGNOSIS — H43.12 VITREOUS HEMORRHAGE, LEFT EYE: ICD-10-CM

## 2024-02-05 DIAGNOSIS — G47.00 INSOMNIA, UNSPECIFIED TYPE: ICD-10-CM

## 2024-02-05 DIAGNOSIS — I70.0 AORTIC ATHEROSCLEROSIS: ICD-10-CM

## 2024-02-05 DIAGNOSIS — Z79.4 TYPE 2 DIABETES MELLITUS WITH OTHER SPECIFIED COMPLICATION, WITH LONG-TERM CURRENT USE OF INSULIN: ICD-10-CM

## 2024-02-05 DIAGNOSIS — E11.69 HYPERLIPIDEMIA ASSOCIATED WITH TYPE 2 DIABETES MELLITUS: ICD-10-CM

## 2024-02-05 PROCEDURE — 99213 OFFICE O/P EST LOW 20 MIN: CPT | Mod: S$PBB,,, | Performed by: PODIATRIST

## 2024-02-05 PROCEDURE — 99213 OFFICE O/P EST LOW 20 MIN: CPT | Mod: PBBFAC,27,25 | Performed by: PODIATRIST

## 2024-02-05 PROCEDURE — 99999 PR PBB SHADOW E&M-EST. PATIENT-LVL IV: CPT | Mod: PBBFAC,,, | Performed by: FAMILY MEDICINE

## 2024-02-05 PROCEDURE — 76770 US EXAM ABDO BACK WALL COMP: CPT | Mod: TC

## 2024-02-05 PROCEDURE — 99214 OFFICE O/P EST MOD 30 MIN: CPT | Mod: S$PBB,,, | Performed by: INTERNAL MEDICINE

## 2024-02-05 PROCEDURE — 93005 ELECTROCARDIOGRAM TRACING: CPT | Mod: PBBFAC | Performed by: INTERNAL MEDICINE

## 2024-02-05 PROCEDURE — 76770 US EXAM ABDO BACK WALL COMP: CPT | Mod: 26,,, | Performed by: STUDENT IN AN ORGANIZED HEALTH CARE EDUCATION/TRAINING PROGRAM

## 2024-02-05 PROCEDURE — 99999 PR PBB SHADOW E&M-EST. PATIENT-LVL III: CPT | Mod: PBBFAC,,, | Performed by: PODIATRIST

## 2024-02-05 PROCEDURE — 99214 OFFICE O/P EST MOD 30 MIN: CPT | Mod: S$PBB,,, | Performed by: FAMILY MEDICINE

## 2024-02-05 PROCEDURE — 99213 OFFICE O/P EST LOW 20 MIN: CPT | Mod: PBBFAC,25 | Performed by: INTERNAL MEDICINE

## 2024-02-05 PROCEDURE — 93010 ELECTROCARDIOGRAM REPORT: CPT | Mod: S$PBB,,, | Performed by: INTERNAL MEDICINE

## 2024-02-05 PROCEDURE — 99999 PR PBB SHADOW E&M-EST. PATIENT-LVL III: CPT | Mod: PBBFAC,,, | Performed by: INTERNAL MEDICINE

## 2024-02-05 PROCEDURE — 99214 OFFICE O/P EST MOD 30 MIN: CPT | Mod: PBBFAC,25,27 | Performed by: FAMILY MEDICINE

## 2024-02-05 RX ORDER — ATORVASTATIN CALCIUM 40 MG/1
40 TABLET, FILM COATED ORAL
COMMUNITY

## 2024-02-05 RX ORDER — DOXEPIN 3 MG/1
3 TABLET, FILM COATED ORAL NIGHTLY PRN
Qty: 30 TABLET | Refills: 0 | Status: SHIPPED | OUTPATIENT
Start: 2024-02-05

## 2024-02-05 RX ORDER — PEN NEEDLE, DIABETIC 30 GX3/16"
1 NEEDLE, DISPOSABLE MISCELLANEOUS
COMMUNITY
Start: 2023-08-28

## 2024-02-05 RX ORDER — LANCETS 28 GAUGE
EACH MISCELLANEOUS
COMMUNITY
Start: 2023-11-22

## 2024-02-05 RX ORDER — LISINOPRIL 20 MG/1
1 TABLET ORAL DAILY
COMMUNITY
Start: 2023-11-30 | End: 2024-02-05

## 2024-02-05 RX ORDER — INSULIN GLARGINE 100 [IU]/ML
20 INJECTION, SOLUTION SUBCUTANEOUS
COMMUNITY
Start: 2023-08-28

## 2024-02-05 RX ORDER — ATORVASTATIN CALCIUM 20 MG/1
20 TABLET, FILM COATED ORAL
COMMUNITY
Start: 2023-08-25 | End: 2024-02-05 | Stop reason: SDUPTHER

## 2024-02-05 RX ORDER — DULAGLUTIDE 0.75 MG/.5ML
INJECTION, SOLUTION SUBCUTANEOUS
COMMUNITY
Start: 2023-08-28

## 2024-02-05 NOTE — PROGRESS NOTES
Subjective:      Patient ID: Jamarcus Mcqueen is a 73 y.o. male.    Chief Complaint: Diabetic Foot Exam    Jamarcus is a 73 y.o. male who presents to the clinic upon referral from Dr. Ann  for evaluation and treatment of diabetic feet. Jamarcus has a past medical history of Allergy, Cataract, Diabetes mellitus type II, Diabetic retinopathy, DM type 2 causing CKD stage 3, Fever blister, Glaucoma, Hand arthritis (5/14/2020), Hyperlipidemia, Hypertension, and Obesity (BMI 30-39.9) (6/4/2015). Patient relates no major problem with feet. Only complaints today consist of yearly comprehensive diabetic  foot examination      PCP: Kt Stacy MD    Date Last Seen by PCP:   Chief Complaint   Patient presents with    Diabetic Foot Exam         Current shoe gear: Casual shoes    Hemoglobin A1C   Date Value Ref Range Status   01/11/2024 6.5 (H) 4.0 - 5.6 % Final     Comment:     ADA Screening Guidelines:  5.7-6.4%  Consistent with prediabetes  >or=6.5%  Consistent with diabetes    High levels of fetal hemoglobin interfere with the HbA1C  assay. Heterozygous hemoglobin variants (HbS, HgC, etc)do  not significantly interfere with this assay.   However, presence of multiple variants may affect accuracy.     12/14/2021 7.6 (H) 4.0 - 5.6 % Final     Comment:     ADA Screening Guidelines:  5.7-6.4%  Consistent with prediabetes  >or=6.5%  Consistent with diabetes    High levels of fetal hemoglobin interfere with the HbA1C  assay. Heterozygous hemoglobin variants (HbS, HgC, etc)do  not significantly interfere with this assay.   However, presence of multiple variants may affect accuracy.     07/08/2021 7.7 (H) 4.0 - 5.6 % Final     Comment:     ADA Screening Guidelines:  5.7-6.4%  Consistent with prediabetes  >or=6.5%  Consistent with diabetes    High levels of fetal hemoglobin interfere with the HbA1C  assay. Heterozygous hemoglobin variants (HbS, HgC, etc)do  not significantly interfere with this assay.   However, presence of  "multiple variants may affect accuracy.             Review of Systems   Constitutional: Negative for chills, decreased appetite and fever.   Cardiovascular:  Negative for leg swelling.   Musculoskeletal:  Negative for arthritis, joint pain, joint swelling and myalgias.   Gastrointestinal:  Negative for nausea and vomiting.   Neurological:  Negative for loss of balance, numbness and paresthesias.         Patient Active Problem List   Diagnosis    CKD (chronic kidney disease) stage 3, GFR 30-59 ml/min    Nuclear sclerosis - Both Eyes    Myopia with astigmatism and presbyopia - Both Eyes    Primary open angle glaucoma, left eye    Primary open angle glaucoma, right eye    Vitreous hemorrhage, left eye    Class 1 obesity due to excess calories with serious comorbidity and body mass index (BMI) of 30.0 to 30.9 in adult    Hypertension associated with diabetes    Hyperlipidemia associated with type 2 diabetes mellitus    Depression    Colon polyps    Type 2 diabetes mellitus with diabetic chronic kidney disease    Type 2 diabetes mellitus with diabetic polyneuropathy    Senile nuclear sclerosis    Moderate nonproliferative diabetic retinopathy of both eyes without macular edema associated with type 2 diabetes mellitus    Proliferative diabetic retinopathy of left eye without macular edema associated with type 2 diabetes mellitus    Meralgia paresthetica    Hand arthritis    Screening for colon cancer    Type 2 diabetes mellitus, with long-term current use of insulin    Aortic atherosclerosis       Current Outpatient Medications on File Prior to Visit   Medication Sig Dispense Refill    atorvastatin (LIPITOR) 20 MG tablet Take 20 mg by mouth.      insulin glargine 100 units/mL SubQ pen Inject 20 Units into the skin.      lisinopriL (PRINIVIL,ZESTRIL) 20 MG tablet Take 1 tablet by mouth once daily.      pen needle, diabetic 31 gauge x 5/16" Ndle 1 each by Other route.      TRUEPLUS PEN NEEDLE 31 gauge x 3/16" Ndle USE TO " INJECT LANTUS DAILY      TRULICITY 0.75 mg/0.5 mL pen injector INJECT 0.5 MLS INTO THE SKIN EVERY 7 (SEVEN) DAYS      atorvastatin (LIPITOR) 40 MG tablet Take 40 mg by mouth.      b complex vitamins capsule Take 1 capsule by mouth once daily.      dulaglutide (TRULICITY) 1.5 mg/0.5 mL pen injector Inject into the skin every 7 days.      ezetimibe (ZETIA) 10 mg tablet Take 1 tablet (10 mg total) by mouth once daily. 90 tablet 1    insulin (LANTUS SOLOSTAR U-100 INSULIN) glargine 100 units/mL SubQ pen Inject 30 Units into the skin every evening. 30 mL 0    insulin lispro (HUMALOG U-100 INSULIN) 100 unit/mL injection Take 3 - 6 units subcutaneously before meals. Needs appointment for future refills 15 mL 0    latanoprost 0.005 % ophthalmic solution INSTILL 1 DROP IN BOTH EYES EVERY EVENING 7.5 mL 0    liraglutide 0.6 mg/0.1 mL, 18 mg/3 mL, subq PNIJ (VICTOZA 2-YAS) 0.6 mg/0.1 mL (18 mg/3 mL) PnIj pen Inject 0.6 mg into the skin daily for 2 weeks then increase to 1.2 mg thereafter (Patient not taking: Reported on 12/5/2023) 3 mL 11    losartan (COZAAR) 25 MG tablet Take 1 tablet (25 mg total) by mouth once daily. 90 tablet 1    rosuvastatin (CRESTOR) 10 MG tablet Take 1 tablet (10 mg total) by mouth once daily. 90 tablet 1    timolol maleate 0.5% (TIMOPTIC-XE) 0.5 % SolG INSTILL 1 DROP IN BOTH EYES DAILY 15 mL 3    vitamin D (VITAMIN D3) 1000 units Tab Take 1,000 Units by mouth once daily.       No current facility-administered medications on file prior to visit.       Review of patient's allergies indicates:   Allergen Reactions    Sulfa (sulfonamide antibiotics) Itching and Anaphylaxis    Lipitor [atorvastatin]      Myalgia       Past Surgical History:   Procedure Laterality Date    Avastin Injection Left 10/09/2014    Dr. Walter    CATARACT EXTRACTION W/  INTRAOCULAR LENS IMPLANT Left 05/18/2016        CATARACT EXTRACTION W/  INTRAOCULAR LENS IMPLANT Right 09/20/2017        COLONOSCOPY N/A  2016    Procedure: COLONOSCOPY;  Surgeon: Juan Pablo Pascual MD;  Location: [facility] (4TH FLR);  Service: Endoscopy;  Laterality: N/A;  Do not cancel this order    COLONOSCOPY N/A 2020    Procedure: COLONOSCOPY;  Surgeon: Nehemias Armas MD;  Location: [facility] (4TH FLR);  Service: Endoscopy;  Laterality: N/A;  -covid felice-tb    EYE SURGERY      HEMORRHOID SURGERY      REFRACTIVE SURGERY   and     TONSILLECTOMY         Family History   Problem Relation Age of Onset    Diabetes Mother     Hyperlipidemia Mother     Glaucoma Mother     No Known Problems Father     Diabetes Sister     No Known Problems Maternal Aunt     No Known Problems Maternal Uncle     No Known Problems Paternal Aunt     No Known Problems Paternal Uncle     No Known Problems Maternal Grandmother     No Known Problems Maternal Grandfather     No Known Problems Paternal Grandmother     No Known Problems Paternal Grandfather     No Known Problems Daughter     No Known Problems Son     Diabetes Sister         complications    No Known Problems Son     No Known Problems Son     No Known Problems Son     Diabetes Daughter     Amblyopia Neg Hx     Blindness Neg Hx     Cataracts Neg Hx     Macular degeneration Neg Hx     Retinal detachment Neg Hx     Strabismus Neg Hx     Melanoma Neg Hx     Cancer Neg Hx     Hypertension Neg Hx     Stroke Neg Hx     Thyroid disease Neg Hx        Social History     Socioeconomic History    Marital status:    Occupational History    Occupation:    Tobacco Use    Smoking status: Former     Current packs/day: 0.00     Types: Cigarettes     Quit date: 1984     Years since quittin.0    Smokeless tobacco: Never   Substance and Sexual Activity    Alcohol use: No     Alcohol/week: 0.0 standard drinks of alcohol    Drug use: No    Sexual activity: Yes     Partners: Female     Social Determinants of Health     Financial Resource Strain: Low Risk  (2022)    Overall  "Financial Resource Strain (CARDIA)     Difficulty of Paying Living Expenses: Not hard at all   Food Insecurity: No Food Insecurity (9/13/2022)    Hunger Vital Sign     Worried About Running Out of Food in the Last Year: Never true     Ran Out of Food in the Last Year: Never true   Transportation Needs: No Transportation Needs (9/13/2022)    PRAPARE - Transportation     Lack of Transportation (Medical): No     Lack of Transportation (Non-Medical): No   Physical Activity: Sufficiently Active (9/13/2022)    Exercise Vital Sign     Days of Exercise per Week: 5 days     Minutes of Exercise per Session: 30 min   Stress: No Stress Concern Present (9/13/2022)    [facility] [employer] - Occupational Stress Questionnaire     Feeling of Stress : Not at all   Social Connections: Moderately Integrated (9/13/2022)    Social Connection and Isolation Panel [NHANES]     Frequency of Communication with Friends and Family: More than three times a week     Frequency of Social Gatherings with Friends and Family: More than three times a week     Attends [employer] Services: More than 4 times per year     Active Member of Clubs or Organizations: No     Attends Club or Organization Meetings: Never     Marital Status: Living with partner   Housing Stability: Low Risk  (9/13/2022)    Housing Stability Vital Sign     Unable to Pay for Housing in the Last Year: No     Number of Places Lived in the Last Year: 1     Unstable Housing in the Last Year: No               Objective:       Vitals:    02/05/24 1237   BP: 115/68   Pulse: 66   Weight: 99.9 kg (220 lb 3.8 oz)   Height: 6' 1" (1.854 m)   PainSc: 0-No pain        Physical Exam  Vitals and nursing note reviewed.   Constitutional:       General: He is not in acute distress.     Appearance: He is well-developed. He is not toxic-appearing or diaphoretic.      Comments: alert and oriented x 3.    Cardiovascular:      Pulses:           Dorsalis pedis pulses are 2+ on the right " side and 2+ on the left side.        Posterior tibial pulses are 2+ on the right side and 2+ on the left side.      Comments:  Capillary refill time is within normal limits. Digital hair present.   Pulmonary:      Effort: No respiratory distress.   Musculoskeletal:         General: No deformity.      Right ankle: No tenderness. No lateral malleolus, medial malleolus, AITF ligament, CF ligament or posterior TF ligament tenderness.      Right Achilles Tendon: No defects. Abad's test negative.      Left ankle: No tenderness. No lateral malleolus, medial malleolus, AITF ligament, CF ligament or posterior TF ligament tenderness.      Left Achilles Tendon: No defects. Abad's test negative.      Right foot: No tenderness or bony tenderness.      Left foot: No tenderness or bony tenderness.      Comments: Adequate joint range of motion without pain, limitation, nor crepitation Bilateral feet and ankle joints. Muscle strength is 5/5 in all groups bilaterally.           Feet:      Right foot:      Protective Sensation: 10 sites tested.  10 sites sensed.      Toenail Condition: Right toenails are abnormally thick. Fungal disease present.     Left foot:      Protective Sensation: 10 sites tested.  10 sites sensed.      Toenail Condition: Left toenails are abnormally thick. Fungal disease present.  Lymphadenopathy:      Comments: No lymphatic streaking     Skin:     General: Skin is warm and dry.      Coloration: Skin is not pale.      Findings: No rash.      Nails: There is no clubbing.      Comments: Skin is of normal turgor.   Normal temperature gradient.  Examination of the skin reveals no evidence of significant rashes, open lesions, suspicious appearing nevi or other concerning lesions.          Scaling dryness in a moccasin distribution is noted to the bilateral lower extremities with associated erythema.       Neurological:      Sensory: No sensory deficit.      Motor: No atrophy.      Comments: Light touch  present     Psychiatric:         Attention and Perception: He is attentive.         Mood and Affect: Mood is not anxious. Affect is not inappropriate.         Speech: He is communicative. Speech is not slurred.         Behavior: Behavior is not combative.               Assessment:       Encounter Diagnosis   Name Primary?    Type 2 diabetes mellitus with diabetic polyneuropathy, unspecified whether long term insulin use Yes         Plan:       Jamarcus was seen today for diabetic foot exam.    Diagnoses and all orders for this visit:    Type 2 diabetes mellitus with diabetic polyneuropathy, unspecified whether long term insulin use      I counseled the patient on his conditions, their implications and medical management.    Yearly exam performed today       Shoe inspection. Diabetic Foot Education. Patient reminded of the importance of good nutrition and blood sugar control to help prevent podiatric complications of diabetes. Patient instructed on proper foot hygeine. We discussed wearing proper shoe gear, daily foot inspections, never walking without protective shoe gear, caution putting sharp instruments to feet     Discussed DM foot care:  Wear comfortable, proper fitting shoes. Wash feet daily. Dry well. After drying, apply moisturizer to feet (no lotion to webspaces). Inspect feet daily for skin breaks, blisters, swelling, or redness. Wear cotton socks (preferably white)  Change socks every day. Do NOT walk barefoot. Do NOT use heating pads or warm/hot water soaks     Discussed importance of daily moisturizer to the feet such as Gold bonds diabetic foot cream    Patient is low risk for developing lower extremity issues secondary to diabetes. I recommend continued yearly diabetic foot examinations.     Currently patient has no pedal manifestations of DM; however, I did discuss the importance of strict BG control due to potential for decreased healing and ability to clear infection. There is also the possibility  for the development of neuropathy.    Patients with out pedal manifestations of DM, do not qualify for nail/callus trimming     - Advised the patient that fungus likes warm, dark, and moist environments such as bathrooms, gyms, and pools. Advised to spray shower, shower mat , and any shoes w/ Lysol periodically. He has been using alejandra's vaporub with good results. I recommend that he continue this.    - RTC 1 year    .

## 2024-02-05 NOTE — PROGRESS NOTES
Subjective:       Patient ID: Jamarcus Mcqueen is a 73 y.o. male.    Chief Complaint: Follow-up    Established patient follows up for management of chronic medical illnesses with complaints today. Please see dictation and ROS for interval problems, specific complaints and disease management discussion.    Past, Surgical, Family, Social, Histories; Medications, allergies reviewed and reconciled.  Health maintenance file reviewed and addressed items due. Recent applicable lab, imaging and cardiovascular results reviewed.  Problem list items reviewed and modified or added entries (in the overview section) may not be transcribed into this encounter note due to note writer format.    Sleep probs.     Receiving some care at [facility].  Lives in [place].  States that he is taking long-term insulin and Trulicity.  Victoza is listed in chart, not taking, states allergic. Atorva listed twice and I started on rosuva, never filled. States on atorva 40 from [place].        Review of Systems   Constitutional:  Negative for appetite change, chills, diaphoresis, fatigue and fever.   HENT:  Negative for congestion, postnasal drip, rhinorrhea, sore throat and trouble swallowing.    Eyes:  Negative for visual disturbance.   Respiratory:  Negative for cough, choking, chest tightness, shortness of breath and wheezing.    Cardiovascular:  Negative for chest pain and leg swelling.   Gastrointestinal:  Negative for abdominal distention, abdominal pain, diarrhea, nausea and vomiting.   Genitourinary:  Negative for difficulty urinating and hematuria.   Musculoskeletal:  Negative for arthralgias and myalgias.   Skin:  Negative for rash.   Neurological:  Negative for weakness, light-headedness and headaches.   Psychiatric/Behavioral:  Positive for sleep disturbance. Negative for confusion and dysphoric mood.         States took took zolpidine in past [this is a [facility] med}       Objective:      Physical Exam  Vitals and  nursing note reviewed.   Constitutional:       General: He is not in acute distress.     Appearance: He is well-developed.   Pulmonary:      Effort: Pulmonary effort is normal.   Musculoskeletal:      Cervical back: Neck supple.      Right lower leg: No edema.      Left lower leg: No edema.   Skin:     General: Skin is warm and dry.      Findings: No rash.   Neurological:      Mental Status: He is alert and oriented to person, place, and time.   Psychiatric:         Behavior: Behavior normal.         Thought Content: Thought content normal.         Judgment: Judgment normal.         Assessment:       1. Type 2 diabetes mellitus with other specified complication, with long-term current use of insulin    2. Hypertension associated with diabetes    3. Hyperlipidemia, unspecified hyperlipidemia type    4. Stage 3a chronic kidney disease    5. Vitreous hemorrhage, left eye    6. Aortic atherosclerosis    7. Hyperlipidemia associated with type 2 diabetes mellitus    8. Insomnia, unspecified type        Plan:     Medication List with Changes/Refills   New Medications    DOXEPIN (SILENOR) 3 MG TAB    Take 3 mg by mouth nightly as needed (insomnia).   Current Medications    ATORVASTATIN (LIPITOR) 40 MG TABLET    Take 40 mg by mouth.    B COMPLEX VITAMINS CAPSULE    Take 1 capsule by mouth once daily.    DULAGLUTIDE (TRULICITY) 1.5 MG/0.5 ML PEN INJECTOR    Inject into the skin every 7 days.    INSULIN (LANTUS SOLOSTAR U-100 INSULIN) GLARGINE 100 UNITS/ML SUBQ PEN    Inject 30 Units into the skin every evening.    INSULIN GLARGINE 100 UNITS/ML SUBQ PEN    Inject 20 Units into the skin.    INSULIN LISPRO (HUMALOG U-100 INSULIN) 100 UNIT/ML INJECTION    Take 3 - 6 units subcutaneously before meals. Needs appointment for future refills    LATANOPROST 0.005 % OPHTHALMIC SOLUTION    INSTILL 1 DROP IN BOTH EYES EVERY EVENING    LOSARTAN (COZAAR) 25 MG TABLET    Take 1 tablet (25 mg total) by mouth once daily.    PEN NEEDLE,  "DIABETIC 31 GAUGE X 5/16" NDLE    1 each by Other route.    TIMOLOL MALEATE 0.5% (TIMOPTIC-XE) 0.5 % SOLG    INSTILL 1 DROP IN BOTH EYES DAILY    TRUEPLUS PEN NEEDLE 31 GAUGE X 3/16" NDLE    USE TO INJECT LANTUS DAILY    TRULICITY 0.75 MG/0.5 ML PEN INJECTOR    INJECT 0.5 MLS INTO THE SKIN EVERY 7 (SEVEN) DAYS    VITAMIN D (VITAMIN D3) 1000 UNITS TAB    Take 1,000 Units by mouth once daily.   Discontinued Medications    ATORVASTATIN (LIPITOR) 20 MG TABLET    Take 20 mg by mouth.    EZETIMIBE (ZETIA) 10 MG TABLET    Take 1 tablet (10 mg total) by mouth once daily.    LIRAGLUTIDE 0.6 MG/0.1 ML, 18 MG/3 ML, SUBQ PNIJ (VICTOZA 2-YAS) 0.6 MG/0.1 ML (18 MG/3 ML) PNIJ PEN    Inject 0.6 mg into the skin daily for 2 weeks then increase to 1.2 mg thereafter    LISINOPRIL (PRINIVIL,ZESTRIL) 20 MG TABLET    Take 1 tablet by mouth once daily.    ROSUVASTATIN (CRESTOR) 10 MG TABLET    Take 1 tablet (10 mg total) by mouth once daily.     1. Type 2 diabetes mellitus with other specified complication, with long-term current use of insulin  Assessment & Plan:  Appt w/ endo pending, labs look very good      2. Hypertension associated with diabetes    3. Hyperlipidemia, unspecified hyperlipidemia type    4. Stage 3a chronic kidney disease  Overview:  -followed by nephrology    Assessment & Plan:  Saw nephrology today, labs stable      5. Vitreous hemorrhage, left eye  Overview:  -circa 2014, dx per ophthalmology and followed in ophthalmology      6. Aortic atherosclerosis  Overview:  -noted on CXR 7/8/2021 on statin      7. Hyperlipidemia associated with type 2 diabetes mellitus  Assessment & Plan:  -considerable med confusion. Did not get crestor or zetia. States taking lipitor 40 from [place], [facility] s[facility]      8. Insomnia, unspecified type  Assessment & Plan:  Took zolpidem (friend) and worked well. I explained meds not to use above 65.    Trial doxepin lo dose, check EKG    Orders:  -     EKG 12-lead; Future  -     doxepin " (SILENOR) 3 mg Tab; Take 3 mg by mouth nightly as needed (insomnia).  Dispense: 30 tablet; Refill: 0      See meds, orders, follow up, routing and instructions sections of encounter and AVS. Discussed with patient and provided on AVS.    Discussed diet and exercise as therapeutic modalities for metabolic and other conditions. Provided patient information, which are included as links on the AVS for detailed information.    Lab Results   Component Value Date     01/11/2024    K 4.2 01/11/2024     01/11/2024    BUN 15 01/11/2024    CREATININE 1.5 (H) 01/11/2024    GLU 83 01/11/2024    HGBA1C 6.5 (H) 01/11/2024    MG 2.1 01/04/2014    AST 37 01/11/2024    ALT 33 01/11/2024    ALBUMIN 3.9 01/11/2024    PROT 6.8 01/11/2024    BILITOT 1.2 (H) 01/11/2024    CHOL 160 01/11/2024    HDL 64 01/11/2024    LDLCALC 86.4 01/11/2024    TRIG 48 01/11/2024    WBC 5.87 01/25/2019    HGB 15.4 01/25/2019    HCT 48.4 01/25/2019     01/25/2019    PSA 2.6 01/11/2024    TSH 1.506 03/24/2016         Diabetes Management Status    Statin: Taking  ACE/ARB: Taking    Screening or Prevention Patient's value Goal Complete/Controlled?   HgA1C Testing and Control   Lab Results   Component Value Date    HGBA1C 6.5 (H) 01/11/2024      Annually/Less than 8% Yes   Lipid profile : 01/11/2024 Annually Yes   LDL control Lab Results   Component Value Date    LDLCALC 86.4 01/11/2024    Annually/Less than 100 mg/dl  Yes   Nephropathy screening Lab Results   Component Value Date    LABMICR <5.0 01/11/2024     Lab Results   Component Value Date    PROTEINUA Negative 07/08/2021     Lab Results   Component Value Date    UTPCR Unable to calculate 07/08/2021      Annually Yes   Blood pressure BP Readings from Last 1 Encounters:   02/05/24 (!) 114/58    Less than 140/90 Yes   Dilated retinal exam : 01/23/2020 Annually No   Foot exam   : 09/29/2021 Annually No

## 2024-02-05 NOTE — ASSESSMENT & PLAN NOTE
Took zolpidem (friend) and worked well. I explained meds not to use above 65.    Trial doxepin lo dose, check EKG

## 2024-02-05 NOTE — PROGRESS NOTES
Nephrology Clinic Note   2/5/2024    Chief Complaint   Patient presents with    Chronic Kidney Disease      History of present illness:  Patient is a 73 y.o. male.   Presents to the clinic today for medical conditions listed below. Here for follow up for CKD stage 3. Last seen in nephrology clinic by Dr Browning in 2021.   Denies NSAID intake   Scr at baseline 1.5 for years   BP controlled   DM now better controlled latest A1c 6.5     Review of Systems   All other systems reviewed and are negative.      History:  Past Medical History:   Diagnosis Date    Allergy     Cataract     Diabetes mellitus type II     Diabetic retinopathy     DM type 2 causing CKD stage 3     Fever blister     Glaucoma     Hand arthritis 5/14/2020    Hyperlipidemia     Hypertension     Obesity (BMI 30-39.9) 6/4/2015      Past Surgical History:   Procedure Laterality Date    Avastin Injection Left 10/09/2014    Dr. Walter    CATARACT EXTRACTION W/  INTRAOCULAR LENS IMPLANT Left 05/18/2016        CATARACT EXTRACTION W/  INTRAOCULAR LENS IMPLANT Right 09/20/2017        COLONOSCOPY N/A 4/1/2016    Procedure: COLONOSCOPY;  Surgeon: Juan Pablo Pascual MD;  Location: [facility] ([address] [place] [address]);  Service: Endoscopy;  Laterality: N/A;  Do not cancel this order    COLONOSCOPY N/A 9/21/2020    Procedure: COLONOSCOPY;  Surgeon: Nehemias Armas MD;  Location: [facility] ([address] [place] [address]);  Service: Endoscopy;  Laterality: N/A;  9/18-covid felice-tb    EYE SURGERY      HEMORRHOID SURGERY      REFRACTIVE SURGERY  2014 and 2015    TONSILLECTOMY          Current Outpatient Medications:     b complex vitamins capsule, Take 1 capsule by mouth once daily., Disp: , Rfl:     ezetimibe (ZETIA) 10 mg tablet, Take 1 tablet (10 mg total) by mouth once daily., Disp: 90 tablet, Rfl: 1    insulin (LANTUS SOLOSTAR U-100 INSULIN) glargine 100 units/mL SubQ pen, Inject 30 Units into the skin every evening., Disp: 30 mL, Rfl: 0    insulin lispro (HUMALOG U-100  INSULIN) 100 unit/mL injection, Take 3 - 6 units subcutaneously before meals. Needs appointment for future refills, Disp: 15 mL, Rfl: 0    latanoprost 0.005 % ophthalmic solution, INSTILL 1 DROP IN BOTH EYES EVERY EVENING, Disp: 7.5 mL, Rfl: 0    losartan (COZAAR) 25 MG tablet, Take 1 tablet (25 mg total) by mouth once daily., Disp: 90 tablet, Rfl: 1    rosuvastatin (CRESTOR) 10 MG tablet, Take 1 tablet (10 mg total) by mouth once daily., Disp: 90 tablet, Rfl: 1    timolol maleate 0.5% (TIMOPTIC-XE) 0.5 % SolG, INSTILL 1 DROP IN BOTH EYES DAILY, Disp: 15 mL, Rfl: 3    vitamin D (VITAMIN D3) 1000 units Tab, Take 1,000 Units by mouth once daily., Disp: , Rfl:     dulaglutide (TRULICITY) 1.5 mg/0.5 mL pen injector, Inject into the skin every 7 days., Disp: , Rfl:     liraglutide 0.6 mg/0.1 mL, 18 mg/3 mL, subq PNIJ (VICTOZA 2-YAS) 0.6 mg/0.1 mL (18 mg/3 mL) PnIj pen, Inject 0.6 mg into the skin daily for 2 weeks then increase to 1.2 mg thereafter (Patient not taking: Reported on 2023), Disp: 3 mL, Rfl: 11  Review of patient's allergies indicates:   Allergen Reactions    Lipitor [atorvastatin]      Myalgia    Sulfa (sulfonamide antibiotics) Itching      Social History     Tobacco Use    Smoking status: Former     Current packs/day: 0.00     Types: Cigarettes     Quit date: 1984     Years since quittin.0    Smokeless tobacco: Never   Substance Use Topics    Alcohol use: No     Alcohol/week: 0.0 standard drinks of alcohol      Family History   Problem Relation Age of Onset    Diabetes Mother     Hyperlipidemia Mother     Glaucoma Mother     No Known Problems Father     Diabetes Sister     No Known Problems Maternal Aunt     No Known Problems Maternal Uncle     No Known Problems Paternal Aunt     No Known Problems Paternal Uncle     No Known Problems Maternal Grandmother     No Known Problems Maternal Grandfather     No Known Problems Paternal Grandmother     No Known Problems Paternal Grandfather     No  Known Problems Daughter     No Known Problems Son     Diabetes Sister         complications    No Known Problems Son     No Known Problems Son     No Known Problems Son     Diabetes Daughter     Amblyopia Neg Hx     Blindness Neg Hx     Cataracts Neg Hx     Macular degeneration Neg Hx     Retinal detachment Neg Hx     Strabismus Neg Hx     Melanoma Neg Hx     Cancer Neg Hx     Hypertension Neg Hx     Stroke Neg Hx     Thyroid disease Neg Hx         Physical Exam :  Vitals:    02/05/24 1129   BP: 120/75   Pulse: 74     Physical Exam  Vitals reviewed.   Constitutional:       Appearance: Normal appearance.   HENT:      Head: Normocephalic and atraumatic.      Mouth/Throat:      Mouth: Mucous membranes are moist.   Eyes:      Conjunctiva/sclera: Conjunctivae normal.      Pupils: Pupils are equal, round, and reactive to light.   Cardiovascular:      Rate and Rhythm: Normal rate and regular rhythm.      Pulses: Normal pulses.      Heart sounds: Normal heart sounds.   Pulmonary:      Effort: Pulmonary effort is normal.      Breath sounds: Normal breath sounds.   Abdominal:      General: Bowel sounds are normal.      Palpations: Abdomen is soft.   Musculoskeletal:         General: Normal range of motion.   Skin:     General: Skin is warm.      Capillary Refill: Capillary refill takes less than 2 seconds.   Neurological:      General: No focal deficit present.      Mental Status: He is alert and oriented to person, place, and time.   Psychiatric:         Mood and Affect: Mood normal.         Labs reviewed   Images Reviewed    Assessment:    1. Stage 3a chronic kidney disease    2       Dm type 2 with CKD stage 3  3       HTN  associated with DM and CKD stage 3       Plan:  Scr stable at baseline 1.5  for years   No proteinuria   Blood pressure controlled c/w losartan   DM management per primary   Avoid NSAIDS advised   Will get renal US  Follow up in 6 months with labs and urine studies

## 2024-02-05 NOTE — ASSESSMENT & PLAN NOTE
-considerable med confusion. Did not get crestor or zetia. States taking lipitor 40 from [facility], denies s.e.

## 2024-02-06 LAB
OHS QRS DURATION: 100 MS
OHS QTC CALCULATION: 402 MS

## 2024-02-09 ENCOUNTER — TELEPHONE (OUTPATIENT)
Dept: ENDOSCOPY | Facility: HOSPITAL | Age: 74
End: 2024-02-09

## 2024-02-09 ENCOUNTER — PATIENT MESSAGE (OUTPATIENT)
Dept: ENDOSCOPY | Facility: HOSPITAL | Age: 74
End: 2024-02-09

## 2024-02-09 NOTE — TELEPHONE ENCOUNTER
Spoke to patient to schedule procedure(s) Colonoscopy       Physician to perform procedure(s) Dr. RADHIKA Pickard  Date of Procedure (s) 5/17/24  Arrival Time 8:45 AM  Time of Procedure(s) 9:45 AM   Location of Procedure(s) [place] 4th Floor  Type of Rx Prep sent to patient: PEG  Instructions provided to patient via [facility]    Patient was informed on the following information and verbalized understanding. Screening questionnaire reviewed with patient and complete. If procedure requires anesthesia, a responsible adult needs to be present to accompany the patient home, patient cannot drive after receiving anesthesia. Appointment details are tentative, especially check-in time. Patient will receive a prep-op call 7 days prior to confirm check-in time for procedure. If applicable the patient should contact their pharmacy to verify Rx for procedure prep is ready for pick-up. Patient was advised to call the scheduling department at [phone] if pharmacy states no Rx is available. Patient was advised to call the endoscopy scheduling department if any questions or concerns arise.      [facility] Scheduling Department

## 2024-06-10 ENCOUNTER — PATIENT MESSAGE (OUTPATIENT)
Dept: INTERNAL MEDICINE | Facility: CLINIC | Age: 74
End: 2024-06-10
Payer: MEDICARE

## 2024-07-17 DIAGNOSIS — E11.9 TYPE 2 DIABETES MELLITUS WITHOUT COMPLICATION: ICD-10-CM

## 2024-07-20 ENCOUNTER — TELEPHONE (OUTPATIENT)
Dept: INTERNAL MEDICINE | Facility: CLINIC | Age: 74
End: 2024-07-20
Payer: MEDICARE

## 2024-07-20 NOTE — TELEPHONE ENCOUNTER
----- Message from Luciana Das RN sent at 2/14/2024  4:51 PM CST -----  When he is tired maybe, but not unless he is doing too much like bending over. He is fatigued because he is only getting 4-6 hours of sleep. The Ambien is working well. He is waking up refreshed. He only needs it about once a week. He is not really feeling anything with his heart, he is incidentally still walking a lot. He has always been a night owl. Thank you Dr. Stacy.   ----- Message -----  From: Kt Stacy MD  Sent: 2/13/2024   6:47 AM CST  To: Nurse Regis    I received automated notification that patient has not read their results and comments on My Chart. Please CALL and advise patient of the comments noted with the original message and items forwarded to staff for action. Please confirm that patient has been contacted and received notification. Thank you.    Please review and complete any tasks (such as scheduling tests and follow ups) that were ordered and sent to staff for action.    Thank you

## 2024-10-09 ENCOUNTER — PATIENT MESSAGE (OUTPATIENT)
Dept: ADMINISTRATIVE | Facility: HOSPITAL | Age: 74
End: 2024-10-09
Payer: MEDICARE

## 2025-01-08 ENCOUNTER — PATIENT MESSAGE (OUTPATIENT)
Dept: ADMINISTRATIVE | Facility: HOSPITAL | Age: 75
End: 2025-01-08
Payer: MEDICARE

## 2025-01-29 DIAGNOSIS — E11.9 TYPE 2 DIABETES MELLITUS WITHOUT COMPLICATION: ICD-10-CM

## 2025-02-22 DIAGNOSIS — Z00.00 ENCOUNTER FOR MEDICARE ANNUAL WELLNESS EXAM: ICD-10-CM

## 2025-03-15 NOTE — PROGRESS NOTES
HPI    DLS: 5/21/2020    Pt here to re-establish Glaucoma Care/HVF review; Pt was referred back   from [facility]  Pt states he does need refills on his Timolol GFS and Latanoprost eye   drop.    Last seen at [facility] - Dr Mueller 5/21/2020   Last seen at [facility]/ Dr Madrid 12/13/2024     Meds;  Timolol GFS QAM Ou  Latanoprost QHS OU    1) POAG OS>>OD   2) PCIOL OU   3) Type 2 DM   4) BDR OD   5) PDR OS   6) VH OS           Last edited by Mandie Mueller MD on 3/18/2025 11:15 AM.            Assessment /Plan     For exam results, see Encounter Report.    Primary open-angle glaucoma, right eye, moderate stage  -     latanoprost 0.005 % ophthalmic solution; Place 1 drop into both eyes every evening.  Dispense: 7.5 mL; Refill: 3  -     dorzolamide-timolol 2-0.5% (COSOPT) 22.3-6.8 mg/mL ophthalmic solution; Place 1 drop into both eyes 2 (two) times daily.  Dispense: 20 mL; Refill: 3    Primary open-angle glaucoma, left eye, severe stage  -     latanoprost 0.005 % ophthalmic solution; Place 1 drop into both eyes every evening.  Dispense: 7.5 mL; Refill: 3  -     dorzolamide-timolol 2-0.5% (COSOPT) 22.3-6.8 mg/mL ophthalmic solution; Place 1 drop into both eyes 2 (two) times daily.  Dispense: 20 mL; Refill: 3    Proliferative diabetic retinopathy of left eye with macular edema associated with diabetes mellitus due to underlying condition    Background diabetic retinopathy of right eye determined by examination    Pseudophakia, both eyes    Primary open angle glaucoma of right eye, moderate stage    Primary open angle glaucoma of left eye, severe stage            HPI     DLS: 4 month check;    Meds:   T1/2 GFS QAM OU   Latanoprost QHS OU     1) POAG OS>>OD   2) PCIOL OU   3) Type 2 DM   4) BDR OD   5) PDR OS   6) VH OS     Last edited by Billie Douglas on 5/21/2020  3:12 PM. (History)            Assessment /Plan     For exam results, see Encounter Report.    Primary open angle glaucoma of right eye,  moderate stage    Primary open angle glaucoma of left eye, severe stage    Controlled type 2 diabetes mellitus with left eye affected by proliferative retinopathy without macular edema, with long-term current use of insulin    Proliferative diabetic retinopathy of left eye without macular edema associated with type 2 diabetes mellitus    Pseudophakia, both eyes      Lost to F/U [facility] glaucoma from 5/21/2020 to 3/18/2025 (almost 5 years)   Sent back over by Dr Julius nicole     1. POAG od moderate stage // POAG severe stage OS  Followed as a borderline glaucoma with ocular hypertension [age] yo 2014     First HVF   2008   First photos   2011   Treatment / Drops started   7/2014              Dx made 4/2014             Family history    + sister /  + mom also a supect - Rx with gtts for a while then taken off        Glaucoma meds    none        H/O adverse rxn to glaucoma drops    none        LASERS    none        GLAUCOMA SURGERIES    none        OTHER EYE SURGERIES    Phaco/IOL OD 9/20/2017 Phaco/IOL OS 5/18/2016 - set for near OU        CDR    0.9/0.95        Tbase    17-26 / 17-26        Tmax    26/26          Ttarget   16-17 ou             HVF    7 test 2008 to  2020 - full /nonspecific defects  od // marked gen dep / SAD / IAD - paracentral loss  os            New base 2025 - SAD od - + prog since 2020 // gen dep / SAD / IAD / central os - + prog since 2020         Gonio    +3 ou (no NVI or NVA os - s/p avastin for PDR)        CCT    544/539        OCT    3 test 2013 to 2020 - RNFL - OD:dec. G/T/TI/N  // OS:dec. G/TS/T/TIayanna        HRT    2 test ([place])  2008 to 2011 - MR - nl od // nl os                    4 test ([facility]) 2014 - 2018 - MR -  [place] off od // dec. Iayanna os         Disc photos    2011 - OIS    - Ttoday   21/17 (up from 14/13- 5 yrs ago)   - Test done today    IOP //  HVF / / OCT - re-establish care      3. Myopia, astigmatism, presbyopia  Glasses  Use to use [facility]  - [place]     4. Diabetes mellitus, type 2   + PDR os - s/p avastin #1 10/19/2014 - [place]  S/P PRP OS   + BDR - od  Sees Jose Angel     5. VH OS- 2/2 DR - resolved  Saw Jose Angel - 9/3/2015   6. PC IOL os - 5/18/2016 -   -PCB00 18.5 - aimed for near vision per pts request (-2.50) // complex trypan blue   PC IOL od - 9/20/2017 - 18.5 - set for near / complex trypan blue        POAG - OS severe stage  POAG- OD moderate stage  + HVF loss os > OD  + OCT - RNFL changes ou  T base 17-26 ou  + fm hx - sister and ?mom  Cont  - Latanoprost - use OU again   IOP much better with addition of timolol 21/23 --> 10-14/12-13    PDR os and BDR od   S/P avastin os 10/19/2014   S/P PRP os   Seeing jose angel    S/p CE - os  - 5/18/2016 - set for near (same as pre-op)   S/P Phaco / IOL OD Date: 9/20/2017 - Aim for -2.5D per patient request  ( PCB00 18.5 )     3/18/2025  + increase in IOP from 5 yesra ago   + VF prog   + OCT prog     Adjust gtts   Latanoprost qhs OU  change timolol to coospt ou bid    Will need to arrange for F/U with jose angel again     F/U 2-3 months IOP check with change in gtts and DILATE for disc photos

## 2025-03-18 ENCOUNTER — OFFICE VISIT (OUTPATIENT)
Dept: OPHTHALMOLOGY | Facility: CLINIC | Age: 75
End: 2025-03-18
Payer: MEDICARE

## 2025-03-18 ENCOUNTER — CLINICAL SUPPORT (OUTPATIENT)
Dept: OPHTHALMOLOGY | Facility: CLINIC | Age: 75
End: 2025-03-18
Payer: MEDICARE

## 2025-03-18 DIAGNOSIS — E11.3291: ICD-10-CM

## 2025-03-18 DIAGNOSIS — Z96.1 PSEUDOPHAKIA, BOTH EYES: ICD-10-CM

## 2025-03-18 DIAGNOSIS — H40.1112 PRIMARY OPEN ANGLE GLAUCOMA OF RIGHT EYE, MODERATE STAGE: ICD-10-CM

## 2025-03-18 DIAGNOSIS — H40.1112 PRIMARY OPEN-ANGLE GLAUCOMA, RIGHT EYE, MODERATE STAGE: Primary | ICD-10-CM

## 2025-03-18 DIAGNOSIS — H40.1123 PRIMARY OPEN-ANGLE GLAUCOMA, LEFT EYE, SEVERE STAGE: ICD-10-CM

## 2025-03-18 DIAGNOSIS — H40.1123 PRIMARY OPEN ANGLE GLAUCOMA OF LEFT EYE, SEVERE STAGE: ICD-10-CM

## 2025-03-18 DIAGNOSIS — E08.3512 PROLIFERATIVE DIABETIC RETINOPATHY OF LEFT EYE WITH MACULAR EDEMA ASSOCIATED WITH DIABETES MELLITUS DUE TO UNDERLYING CONDITION: ICD-10-CM

## 2025-03-18 PROCEDURE — 99999 PR PBB SHADOW E&M-EST. PATIENT-LVL I: CPT | Mod: PBBFAC,,, | Performed by: OPHTHALMOLOGY

## 2025-03-18 PROCEDURE — 99204 OFFICE O/P NEW MOD 45 MIN: CPT | Mod: S$PBB,,, | Performed by: OPHTHALMOLOGY

## 2025-03-18 PROCEDURE — 99211 OFF/OP EST MAY X REQ PHY/QHP: CPT | Mod: PBBFAC | Performed by: OPHTHALMOLOGY

## 2025-03-18 RX ORDER — DORZOLAMIDE HYDROCHLORIDE AND TIMOLOL MALEATE 20; 5 MG/ML; MG/ML
1 SOLUTION/ DROPS OPHTHALMIC 2 TIMES DAILY
Qty: 20 ML | Refills: 3 | Status: SHIPPED | OUTPATIENT
Start: 2025-03-18

## 2025-03-18 RX ORDER — LATANOPROST 50 UG/ML
1 SOLUTION/ DROPS OPHTHALMIC NIGHTLY
Qty: 7.5 ML | Refills: 3 | Status: SHIPPED | OUTPATIENT
Start: 2025-03-18

## 2025-03-18 NOTE — PROGRESS NOTES
oct done ou     24-2  sf done ou     Rel & Fix =  good ou      Coop=      good     Patient has no allergies to latex or adhesives at this time    Jthomas    Mrx    -3.00 + 1.75 x 180   od    -3.50 + 1.50 x 165   os

## (undated) DEVICE — FILTER STRAW

## (undated) DEVICE — BLADE SURG BVL ANG COAX 2.4MM

## (undated) DEVICE — KIT GREY EYE

## (undated) DEVICE — SEE MEDLINE ITEM 157131

## (undated) DEVICE — SHIELD COLLAGEN 12HR CORNEAL

## (undated) DEVICE — SOLUTION BSS PLUS

## (undated) DEVICE — GOWN SURGICAL X-LARGE

## (undated) DEVICE — KNIFE ANGLE 1MM

## (undated) DEVICE — Device

## (undated) DEVICE — DRAPE STERI 32 X 50